# Patient Record
Sex: FEMALE | Race: WHITE
[De-identification: names, ages, dates, MRNs, and addresses within clinical notes are randomized per-mention and may not be internally consistent; named-entity substitution may affect disease eponyms.]

---

## 2017-01-11 ENCOUNTER — RECORD ABSTRACTING (OUTPATIENT)
Age: 47
End: 2017-01-11

## 2017-01-11 DIAGNOSIS — Z78.9 OTHER SPECIFIED HEALTH STATUS: ICD-10-CM

## 2017-01-11 DIAGNOSIS — D64.9 ANEMIA, UNSPECIFIED: ICD-10-CM

## 2017-01-11 DIAGNOSIS — N60.02 SOLITARY CYST OF LEFT BREAST: ICD-10-CM

## 2017-01-11 DIAGNOSIS — K51.90 ULCERATIVE COLITIS, UNSPECIFIED, W/OUT COMPLICATIONS: ICD-10-CM

## 2017-01-11 DIAGNOSIS — Z80.3 FAMILY HISTORY OF MALIGNANT NEOPLASM OF BREAST: ICD-10-CM

## 2017-03-06 ENCOUNTER — APPOINTMENT (OUTPATIENT)
Dept: BREAST CENTER | Facility: CLINIC | Age: 47
End: 2017-03-06

## 2017-05-22 ENCOUNTER — APPOINTMENT (OUTPATIENT)
Dept: BREAST CENTER | Facility: CLINIC | Age: 47
End: 2017-05-22

## 2017-05-31 ENCOUNTER — OUTPATIENT (OUTPATIENT)
Dept: OUTPATIENT SERVICES | Facility: HOSPITAL | Age: 47
LOS: 1 days | Discharge: HOME | End: 2017-05-31

## 2017-05-31 DIAGNOSIS — R07.9 CHEST PAIN, UNSPECIFIED: ICD-10-CM

## 2017-05-31 DIAGNOSIS — R07.2 PRECORDIAL PAIN: ICD-10-CM

## 2017-06-28 DIAGNOSIS — D05.12 INTRADUCTAL CARCINOMA IN SITU OF LEFT BREAST: ICD-10-CM

## 2017-09-12 DIAGNOSIS — D05.10 INTRADUCTAL CARCINOMA IN SITU OF UNSPECIFIED BREAST: ICD-10-CM

## 2017-09-18 ENCOUNTER — OUTPATIENT (OUTPATIENT)
Dept: OUTPATIENT SERVICES | Facility: HOSPITAL | Age: 47
LOS: 1 days | Discharge: HOME | End: 2017-09-18

## 2017-09-18 DIAGNOSIS — R07.2 PRECORDIAL PAIN: ICD-10-CM

## 2017-09-18 DIAGNOSIS — R92.8 OTHER ABNORMAL AND INCONCLUSIVE FINDINGS ON DIAGNOSTIC IMAGING OF BREAST: ICD-10-CM

## 2017-09-18 DIAGNOSIS — R07.9 CHEST PAIN, UNSPECIFIED: ICD-10-CM

## 2017-09-22 DIAGNOSIS — R92.8 OTHER ABNORMAL AND INCONCLUSIVE FINDINGS ON DIAGNOSTIC IMAGING OF BREAST: ICD-10-CM

## 2017-09-29 ENCOUNTER — OUTPATIENT (OUTPATIENT)
Dept: OUTPATIENT SERVICES | Facility: HOSPITAL | Age: 47
LOS: 1 days | Discharge: HOME | End: 2017-09-29

## 2017-09-29 DIAGNOSIS — R07.2 PRECORDIAL PAIN: ICD-10-CM

## 2017-09-29 DIAGNOSIS — N64.9 DISORDER OF BREAST, UNSPECIFIED: ICD-10-CM

## 2017-09-29 DIAGNOSIS — N64.2 ATROPHY OF BREAST: ICD-10-CM

## 2017-09-29 DIAGNOSIS — R07.9 CHEST PAIN, UNSPECIFIED: ICD-10-CM

## 2019-03-14 ENCOUNTER — EMERGENCY (EMERGENCY)
Facility: HOSPITAL | Age: 49
LOS: 0 days | Discharge: HOME | End: 2019-03-15
Attending: EMERGENCY MEDICINE | Admitting: PHYSICIAN ASSISTANT

## 2019-03-14 VITALS
HEART RATE: 97 BPM | OXYGEN SATURATION: 99 % | DIASTOLIC BLOOD PRESSURE: 70 MMHG | TEMPERATURE: 97 F | RESPIRATION RATE: 18 BRPM | SYSTOLIC BLOOD PRESSURE: 139 MMHG

## 2019-03-14 DIAGNOSIS — Z86.711 PERSONAL HISTORY OF PULMONARY EMBOLISM: ICD-10-CM

## 2019-03-14 DIAGNOSIS — G51.0 BELL'S PALSY: ICD-10-CM

## 2019-03-14 DIAGNOSIS — R06.02 SHORTNESS OF BREATH: ICD-10-CM

## 2019-03-14 DIAGNOSIS — R07.89 OTHER CHEST PAIN: ICD-10-CM

## 2019-03-14 DIAGNOSIS — R07.9 CHEST PAIN, UNSPECIFIED: ICD-10-CM

## 2019-03-14 LAB
ALBUMIN SERPL ELPH-MCNC: 3.6 G/DL — SIGNIFICANT CHANGE UP (ref 3.5–5.2)
ALP SERPL-CCNC: 87 U/L — SIGNIFICANT CHANGE UP (ref 30–115)
ALT FLD-CCNC: 17 U/L — SIGNIFICANT CHANGE UP (ref 0–41)
ANION GAP SERPL CALC-SCNC: 11 MMOL/L — SIGNIFICANT CHANGE UP (ref 7–14)
AST SERPL-CCNC: 29 U/L — SIGNIFICANT CHANGE UP (ref 0–41)
BASOPHILS # BLD AUTO: 0.03 K/UL — SIGNIFICANT CHANGE UP (ref 0–0.2)
BASOPHILS NFR BLD AUTO: 0.5 % — SIGNIFICANT CHANGE UP (ref 0–1)
BILIRUB SERPL-MCNC: 0.3 MG/DL — SIGNIFICANT CHANGE UP (ref 0.2–1.2)
BUN SERPL-MCNC: 13 MG/DL — SIGNIFICANT CHANGE UP (ref 10–20)
CALCIUM SERPL-MCNC: 9.2 MG/DL — SIGNIFICANT CHANGE UP (ref 8.5–10.1)
CHLORIDE SERPL-SCNC: 104 MMOL/L — SIGNIFICANT CHANGE UP (ref 98–110)
CK SERPL-CCNC: 81 U/L — SIGNIFICANT CHANGE UP (ref 0–225)
CO2 SERPL-SCNC: 24 MMOL/L — SIGNIFICANT CHANGE UP (ref 17–32)
CREAT SERPL-MCNC: 0.8 MG/DL — SIGNIFICANT CHANGE UP (ref 0.7–1.5)
EOSINOPHIL # BLD AUTO: 0.11 K/UL — SIGNIFICANT CHANGE UP (ref 0–0.7)
EOSINOPHIL NFR BLD AUTO: 2 % — SIGNIFICANT CHANGE UP (ref 0–8)
GLUCOSE SERPL-MCNC: 90 MG/DL — SIGNIFICANT CHANGE UP (ref 70–99)
HCT VFR BLD CALC: 39.4 % — SIGNIFICANT CHANGE UP (ref 37–47)
HGB BLD-MCNC: 12.2 G/DL — SIGNIFICANT CHANGE UP (ref 12–16)
IMM GRANULOCYTES NFR BLD AUTO: 0.2 % — SIGNIFICANT CHANGE UP (ref 0.1–0.3)
LIDOCAIN IGE QN: 28 U/L — SIGNIFICANT CHANGE UP (ref 7–60)
LYMPHOCYTES # BLD AUTO: 1.36 K/UL — SIGNIFICANT CHANGE UP (ref 1.2–3.4)
LYMPHOCYTES # BLD AUTO: 24.4 % — SIGNIFICANT CHANGE UP (ref 20.5–51.1)
MCHC RBC-ENTMCNC: 24.9 PG — LOW (ref 27–31)
MCHC RBC-ENTMCNC: 31 G/DL — LOW (ref 32–37)
MCV RBC AUTO: 80.6 FL — LOW (ref 81–99)
MONOCYTES # BLD AUTO: 0.69 K/UL — HIGH (ref 0.1–0.6)
MONOCYTES NFR BLD AUTO: 12.4 % — HIGH (ref 1.7–9.3)
NEUTROPHILS # BLD AUTO: 3.37 K/UL — SIGNIFICANT CHANGE UP (ref 1.4–6.5)
NEUTROPHILS NFR BLD AUTO: 60.5 % — SIGNIFICANT CHANGE UP (ref 42.2–75.2)
NRBC # BLD: 0 /100 WBCS — SIGNIFICANT CHANGE UP (ref 0–0)
PLATELET # BLD AUTO: 318 K/UL — SIGNIFICANT CHANGE UP (ref 130–400)
POTASSIUM SERPL-MCNC: 4.8 MMOL/L — SIGNIFICANT CHANGE UP (ref 3.5–5)
POTASSIUM SERPL-SCNC: 4.8 MMOL/L — SIGNIFICANT CHANGE UP (ref 3.5–5)
PROT SERPL-MCNC: 7 G/DL — SIGNIFICANT CHANGE UP (ref 6–8)
RBC # BLD: 4.89 M/UL — SIGNIFICANT CHANGE UP (ref 4.2–5.4)
RBC # FLD: 14.2 % — SIGNIFICANT CHANGE UP (ref 11.5–14.5)
SODIUM SERPL-SCNC: 139 MMOL/L — SIGNIFICANT CHANGE UP (ref 135–146)
TROPONIN T SERPL-MCNC: <0.01 NG/ML — SIGNIFICANT CHANGE UP
WBC # BLD: 5.57 K/UL — SIGNIFICANT CHANGE UP (ref 4.8–10.8)
WBC # FLD AUTO: 5.57 K/UL — SIGNIFICANT CHANGE UP (ref 4.8–10.8)

## 2019-03-14 NOTE — ED PROVIDER NOTE - PHYSICAL EXAMINATION
VITAL SIGNS: I have reviewed nursing notes and confirm.  CONSTITUTIONAL: non-toxic  SKIN: no rash, no petechiae.  EYES: PERRL, EOMI, pink conjunctiva, anicteric  ENT: tongue midline, no exudates, MMM  NECK: Supple; no meningismus, no JVD  CARD: RRR, no murmurs, equal radial pulses bilaterally 2+  RESP: CTAB, no respiratory distress  ABD: Soft, non-tender, non-distended, no peritoneal signs, no HSM, no CVA tenderness  EXT: Normal ROM x4. No edema. No calves tenderness  NEURO: Alert, oriented. CN2-12 intact, equal strength bilaterally, nl gait.  PSYCH: Cooperative, anxious

## 2019-03-14 NOTE — ED ADULT NURSE NOTE - PMH
Head Injury Chest pain, unspecified type Breast cancer  left side, lumpectomy  Chest pain, unspecified type Bell palsy    Breast cancer  left side, lumpectomy  Chest pain, unspecified type    Pulmonary embolism

## 2019-03-14 NOTE — ED PROVIDER NOTE - PROGRESS NOTE DETAILS
signed out to Dr. Mitchell, follow up ct, reassess, likely obs CTA negative for PE, will place patient in obs for further work  up of CP.

## 2019-03-14 NOTE — ED PROVIDER NOTE - OBJECTIVE STATEMENT
48 yo female h/o breast CA in remission, PE 6 years ago not currently on any meds, MVP and Bell's Palsy p/w chest pain and pressure today. BIEMS. Pt notes she had flu like symptoms last week that resolved but has not been back to her normal self this week. She has been having "agida" this week. today she c/o sscp and pain, as if elephant is sitting on her chest, associated with some htn and sob.  Got ntg and asa by ems with some relief.  Denies trauma, recent travel, leg pain or swelling. Notes this feels different from when she had PE when she had primarily sob.

## 2019-03-14 NOTE — ED PROVIDER NOTE - CLINICAL SUMMARY MEDICAL DECISION MAKING FREE TEXT BOX
48 yo female with CP, troponin, CXR and CTA chest are negative, ECG non-ischemic , patient was placed in EDOU for further work up.

## 2019-03-14 NOTE — ED PROVIDER NOTE - NS ED ROS FT
Review of Systems    Constitutional: (-) fever  Respiratory: (-) cough, (-) shortness of breath  Gastrointestinal: (-) vomiting, (-) diarrhea, (-) abdominal pain  Musculoskeletal: (-) neck pain, (-) back pain, (-) joint pain  Integumentary: (-) rash, (-) edema  Neurological: (-) headache, (-) altered mental status    Except as documented in the HPI, all other systems are negative.

## 2019-03-15 VITALS
SYSTOLIC BLOOD PRESSURE: 117 MMHG | HEART RATE: 78 BPM | DIASTOLIC BLOOD PRESSURE: 69 MMHG | TEMPERATURE: 99 F | OXYGEN SATURATION: 100 % | RESPIRATION RATE: 20 BRPM

## 2019-03-15 LAB — TROPONIN T SERPL-MCNC: <0.01 NG/ML — SIGNIFICANT CHANGE UP

## 2019-03-15 NOTE — ED CDU PROVIDER INITIAL DAY NOTE - PHYSICAL EXAMINATION
Constitutional: Well developed, well nourished. NAD  Head: Normocephalic, atraumatic.  Eyes: PERRL, EOMI.  ENT: No nasal discharge. Mucous membranes dry.  Neck: Supple. Painless ROM.  Cardiovascular:  Regular rate and rhythm.   Pulmonary: . Lungs clear to auscultation bilaterally. No wheezing, rales, or rhonchi.  Abdominal: Soft. Nondistended. No rebound, guarding, rigidity.  Extremities. Pelvis stable. No lower extremity edema, symmetric calves.  Skin: No rashes, cyanosis.  Neuro: AAOx3. No focal neurological deficits.  Psych: Normal mood. Normal affect.

## 2019-03-15 NOTE — ED CDU PROVIDER INITIAL DAY NOTE - OBJECTIVE STATEMENT
49 yold female to ED Pmhx Breast ca, Hx Pe, MVP c/o moderated sscp describe as pressure intermittent x 1 day; pt with sob; pt denies n/v/fever, chills, diaphoresis, - did not attempt otc pain meds; pain no pleuritic; pt seen in main Ed ct angio done negative for PE; pt place in obs for futher eval; pt doesn't want nuclear stress test, cant have ccta(already received IV dye today);

## 2019-03-15 NOTE — ED CDU PROVIDER DISPOSITION NOTE - NSFOLLOWUPINSTRUCTIONS_ED_ALL_ED_FT
Follow up with your PMD in 1 week  Continue all your home medications  Return to the ED if your symptoms worsen/return

## 2019-03-15 NOTE — ED CDU PROVIDER INITIAL DAY NOTE - PROGRESS NOTE DETAILS
received signout from Dr. Mitchell for eval of cp; 2nd ce sent; will plan for regular stress in am; Pt seen at bedside, NAD. Arrived overnight, received from [facility]. Pt presenting for chest pain associated with SOB. Cardiac enzymes negative x 2. CTA - negative PE. Pt scheduled for Exercise Stress test Pt s/p Exercise Stress test, negative, patient able to be discharged. Dr Gómez aware of the patient and disposition. Pt was discharged under OBS Attending Dr Gómez.

## 2019-03-15 NOTE — ED CDU PROVIDER DISPOSITION NOTE - CLINICAL COURSE
Patient was sent to EDOU for further evaaluation of atypical chest pains, has remained in no distress and with stable vitals, ekgs times two normal and without any ischemic changes, stress testing was read as normal, Copies of all blood work and other studies were given to patient and family and will fallow up with cardiology next week

## 2019-04-30 PROBLEM — C50.919 MALIGNANT NEOPLASM OF UNSPECIFIED SITE OF UNSPECIFIED FEMALE BREAST: Chronic | Status: ACTIVE | Noted: 2019-03-15

## 2019-04-30 PROBLEM — G51.0 BELL'S PALSY: Chronic | Status: ACTIVE | Noted: 2019-03-15

## 2019-05-02 ENCOUNTER — APPOINTMENT (OUTPATIENT)
Dept: OBGYN | Facility: CLINIC | Age: 49
End: 2019-05-02
Payer: COMMERCIAL

## 2019-05-02 ENCOUNTER — LABORATORY RESULT (OUTPATIENT)
Age: 49
End: 2019-05-02

## 2019-05-02 ENCOUNTER — OUTPATIENT (OUTPATIENT)
Dept: OUTPATIENT SERVICES | Facility: HOSPITAL | Age: 49
LOS: 1 days | Discharge: HOME | End: 2019-05-02

## 2019-05-02 VITALS
WEIGHT: 164 LBS | BODY MASS INDEX: 27.32 KG/M2 | HEIGHT: 65 IN | DIASTOLIC BLOOD PRESSURE: 80 MMHG | SYSTOLIC BLOOD PRESSURE: 120 MMHG

## 2019-05-02 DIAGNOSIS — N89.9 NONINFLAMMATORY DISORDER OF VAGINA, UNSPECIFIED: ICD-10-CM

## 2019-05-02 DIAGNOSIS — N83.209 UNSPECIFIED OVARIAN CYST, UNSPECIFIED SIDE: ICD-10-CM

## 2019-05-02 DIAGNOSIS — Z00.00 ENCOUNTER FOR GENERAL ADULT MEDICAL EXAMINATION W/OUT ABNORMAL FINDINGS: ICD-10-CM

## 2019-05-02 DIAGNOSIS — D25.9 LEIOMYOMA OF UTERUS, UNSPECIFIED: ICD-10-CM

## 2019-05-02 DIAGNOSIS — R10.2 PELVIC AND PERINEAL PAIN: ICD-10-CM

## 2019-05-02 DIAGNOSIS — Z01.411 ENCOUNTER FOR GYNECOLOGICAL EXAMINATION (GENERAL) (ROUTINE) WITH ABNORMAL FINDINGS: ICD-10-CM

## 2019-05-02 PROCEDURE — 99202 OFFICE O/P NEW SF 15 MIN: CPT | Mod: 25

## 2019-05-02 PROCEDURE — 57135 EXCISION VAGINAL CYST/TUMOR: CPT

## 2019-05-02 PROCEDURE — 99386 PREV VISIT NEW AGE 40-64: CPT | Mod: 25

## 2019-05-02 NOTE — COUNSELING
[Nutrition] : nutrition [Exercise] : exercise [Vitamins/Supplements] : vitamins/supplements [STD (testing, results, tx)] : STD (testing, results, tx) [Safe Sexual Practices] : safe sexual practices [Weight Management] : weight management

## 2019-05-02 NOTE — PHYSICAL EXAM
[Alert] : alert [Awake] : awake [Acute Distress] : no acute distress [Mass] : no breast mass [Nipple Discharge] : no nipple discharge [Axillary LAD] : no axillary lymphadenopathy [Tender] : non tender [Soft] : soft [Oriented x3] : oriented to person, place, and time [Normal] : vagina [No Bleeding] : there was no active vaginal bleeding [Discharge] : had no discharge [Pap Obtained] : a Pap smear was performed [de-identified] : yellow mass noted in between labia minora [FreeTextEntry5] : yellow material coating the right vagina and right cervix. pt was unable to tolerate bimanual exam at this moment; no prolapse was noted. [FreeTextEntry4] : yellow/gray, necrotic appearing mass, 3-4 cm, foul smelling, with 3mm stalk coming from vaginal wall, near the hymen at about 9 oclock. [FreeTextEntry7] : pt was unable to tolerate bimanual exam at this moment

## 2019-05-02 NOTE — HISTORY OF PRESENT ILLNESS
[Last Mammogram ___] : Last Mammogram was [unfilled] [Postmenopausal] : is postmenopausal [Pregnancy History] : pregnancy history:

## 2019-05-28 ENCOUNTER — APPOINTMENT (OUTPATIENT)
Dept: ANTEPARTUM | Facility: CLINIC | Age: 49
End: 2019-05-28
Payer: COMMERCIAL

## 2019-05-28 PROCEDURE — 76856 US EXAM PELVIC COMPLETE: CPT | Mod: 26

## 2019-05-28 PROCEDURE — 76830 TRANSVAGINAL US NON-OB: CPT | Mod: 26

## 2019-05-30 LAB — HPV HIGH+LOW RISK DNA PNL CVX: NOT DETECTED

## 2019-07-11 ENCOUNTER — OUTPATIENT (OUTPATIENT)
Dept: OUTPATIENT SERVICES | Facility: HOSPITAL | Age: 49
LOS: 1 days | Discharge: HOME | End: 2019-07-11

## 2019-07-11 ENCOUNTER — APPOINTMENT (OUTPATIENT)
Dept: OBGYN | Facility: CLINIC | Age: 49
End: 2019-07-11
Payer: MEDICARE

## 2019-07-11 VITALS
DIASTOLIC BLOOD PRESSURE: 88 MMHG | SYSTOLIC BLOOD PRESSURE: 122 MMHG | WEIGHT: 161 LBS | BODY MASS INDEX: 26.82 KG/M2 | HEIGHT: 65 IN

## 2019-07-11 DIAGNOSIS — D25.9 LEIOMYOMA OF UTERUS, UNSPECIFIED: ICD-10-CM

## 2019-07-11 DIAGNOSIS — R10.2 PELVIC AND PERINEAL PAIN: ICD-10-CM

## 2019-07-11 PROCEDURE — 99213 OFFICE O/P EST LOW 20 MIN: CPT

## 2019-07-11 NOTE — PHYSICAL EXAM
[Normal] : uterus [No Bleeding] : there was no active vaginal bleeding [Motion Tenderness] : there was no cervical motion tenderness [Uterine Adnexae] : were not tender and not enlarged [de-identified] : site from removal of lesion is well healed. [FreeTextEntry5] : appears normal  [FreeTextEntry7] : enlarged, mobile, irregular. fullness anteriorly and on the left

## 2019-07-11 NOTE — COUNSELING
[Nutrition] : nutrition [Exercise] : exercise [Vitamins/Supplements] : vitamins/supplements [Safe Sexual Practices] : safe sexual practices [STD (testing, results, tx)] : STD (testing, results, tx) [Pre/Post Op Instructions] : pre/post op instructions

## 2019-07-12 DIAGNOSIS — D25.9 LEIOMYOMA OF UTERUS, UNSPECIFIED: ICD-10-CM

## 2019-07-12 DIAGNOSIS — R10.2 PELVIC AND PERINEAL PAIN: ICD-10-CM

## 2019-08-19 ENCOUNTER — OUTPATIENT (OUTPATIENT)
Dept: OUTPATIENT SERVICES | Facility: HOSPITAL | Age: 49
LOS: 1 days | Discharge: HOME | End: 2019-08-19
Payer: MEDICARE

## 2019-08-19 VITALS
HEART RATE: 83 BPM | OXYGEN SATURATION: 98 % | HEIGHT: 65 IN | TEMPERATURE: 98 F | DIASTOLIC BLOOD PRESSURE: 85 MMHG | RESPIRATION RATE: 16 BRPM | WEIGHT: 162.04 LBS | SYSTOLIC BLOOD PRESSURE: 133 MMHG

## 2019-08-19 DIAGNOSIS — Z01.818 ENCOUNTER FOR OTHER PREPROCEDURAL EXAMINATION: ICD-10-CM

## 2019-08-19 DIAGNOSIS — D25.9 LEIOMYOMA OF UTERUS, UNSPECIFIED: ICD-10-CM

## 2019-08-19 DIAGNOSIS — Z98.890 OTHER SPECIFIED POSTPROCEDURAL STATES: Chronic | ICD-10-CM

## 2019-08-19 DIAGNOSIS — Z90.49 ACQUIRED ABSENCE OF OTHER SPECIFIED PARTS OF DIGESTIVE TRACT: Chronic | ICD-10-CM

## 2019-08-19 LAB
ALBUMIN SERPL ELPH-MCNC: 4.3 G/DL — SIGNIFICANT CHANGE UP (ref 3.5–5.2)
ALP SERPL-CCNC: 102 U/L — SIGNIFICANT CHANGE UP (ref 30–115)
ALT FLD-CCNC: 14 U/L — SIGNIFICANT CHANGE UP (ref 0–41)
ANION GAP SERPL CALC-SCNC: 13 MMOL/L — SIGNIFICANT CHANGE UP (ref 7–14)
APPEARANCE UR: CLEAR — SIGNIFICANT CHANGE UP
APTT BLD: 32.6 SEC — SIGNIFICANT CHANGE UP (ref 27–39.2)
AST SERPL-CCNC: 17 U/L — SIGNIFICANT CHANGE UP (ref 0–41)
BASOPHILS # BLD AUTO: 0.02 K/UL — SIGNIFICANT CHANGE UP (ref 0–0.2)
BASOPHILS NFR BLD AUTO: 0.3 % — SIGNIFICANT CHANGE UP (ref 0–1)
BILIRUB SERPL-MCNC: 0.3 MG/DL — SIGNIFICANT CHANGE UP (ref 0.2–1.2)
BILIRUB UR-MCNC: NEGATIVE — SIGNIFICANT CHANGE UP
BLD GP AB SCN SERPL QL: SIGNIFICANT CHANGE UP
BUN SERPL-MCNC: 16 MG/DL — SIGNIFICANT CHANGE UP (ref 10–20)
CALCIUM SERPL-MCNC: 9.6 MG/DL — SIGNIFICANT CHANGE UP (ref 8.5–10.1)
CHLORIDE SERPL-SCNC: 101 MMOL/L — SIGNIFICANT CHANGE UP (ref 98–110)
CO2 SERPL-SCNC: 28 MMOL/L — SIGNIFICANT CHANGE UP (ref 17–32)
COLOR SPEC: YELLOW — SIGNIFICANT CHANGE UP
CREAT SERPL-MCNC: 0.7 MG/DL — SIGNIFICANT CHANGE UP (ref 0.7–1.5)
DIFF PNL FLD: NEGATIVE — SIGNIFICANT CHANGE UP
EOSINOPHIL # BLD AUTO: 0.1 K/UL — SIGNIFICANT CHANGE UP (ref 0–0.7)
EOSINOPHIL NFR BLD AUTO: 1.7 % — SIGNIFICANT CHANGE UP (ref 0–8)
ESTIMATED AVERAGE GLUCOSE: 114 MG/DL — SIGNIFICANT CHANGE UP (ref 68–114)
GLUCOSE SERPL-MCNC: 74 MG/DL — SIGNIFICANT CHANGE UP (ref 70–99)
GLUCOSE UR QL: NEGATIVE — SIGNIFICANT CHANGE UP
HBA1C BLD-MCNC: 5.6 % — SIGNIFICANT CHANGE UP (ref 4–5.6)
HCT VFR BLD CALC: 42.4 % — SIGNIFICANT CHANGE UP (ref 37–47)
HGB BLD-MCNC: 12.9 G/DL — SIGNIFICANT CHANGE UP (ref 12–16)
IMM GRANULOCYTES NFR BLD AUTO: 0.2 % — SIGNIFICANT CHANGE UP (ref 0.1–0.3)
INR BLD: 1.03 RATIO — SIGNIFICANT CHANGE UP (ref 0.65–1.3)
KETONES UR-MCNC: NEGATIVE — SIGNIFICANT CHANGE UP
LEUKOCYTE ESTERASE UR-ACNC: NEGATIVE — SIGNIFICANT CHANGE UP
LYMPHOCYTES # BLD AUTO: 1.47 K/UL — SIGNIFICANT CHANGE UP (ref 1.2–3.4)
LYMPHOCYTES # BLD AUTO: 24.3 % — SIGNIFICANT CHANGE UP (ref 20.5–51.1)
MCHC RBC-ENTMCNC: 24.7 PG — LOW (ref 27–31)
MCHC RBC-ENTMCNC: 30.4 G/DL — LOW (ref 32–37)
MCV RBC AUTO: 81.2 FL — SIGNIFICANT CHANGE UP (ref 81–99)
MONOCYTES # BLD AUTO: 0.69 K/UL — HIGH (ref 0.1–0.6)
MONOCYTES NFR BLD AUTO: 11.4 % — HIGH (ref 1.7–9.3)
NEUTROPHILS # BLD AUTO: 3.76 K/UL — SIGNIFICANT CHANGE UP (ref 1.4–6.5)
NEUTROPHILS NFR BLD AUTO: 62.1 % — SIGNIFICANT CHANGE UP (ref 42.2–75.2)
NITRITE UR-MCNC: NEGATIVE — SIGNIFICANT CHANGE UP
NRBC # BLD: 0 /100 WBCS — SIGNIFICANT CHANGE UP (ref 0–0)
PH UR: 5.5 — SIGNIFICANT CHANGE UP (ref 5–8)
PLATELET # BLD AUTO: 338 K/UL — SIGNIFICANT CHANGE UP (ref 130–400)
POTASSIUM SERPL-MCNC: 4.6 MMOL/L — SIGNIFICANT CHANGE UP (ref 3.5–5)
POTASSIUM SERPL-SCNC: 4.6 MMOL/L — SIGNIFICANT CHANGE UP (ref 3.5–5)
PROT SERPL-MCNC: 7.9 G/DL — SIGNIFICANT CHANGE UP (ref 6–8)
PROT UR-MCNC: SIGNIFICANT CHANGE UP
PROTHROM AB SERPL-ACNC: 11.8 SEC — SIGNIFICANT CHANGE UP (ref 9.95–12.87)
RBC # BLD: 5.22 M/UL — SIGNIFICANT CHANGE UP (ref 4.2–5.4)
RBC # FLD: 15.2 % — HIGH (ref 11.5–14.5)
SODIUM SERPL-SCNC: 142 MMOL/L — SIGNIFICANT CHANGE UP (ref 135–146)
SP GR SPEC: 1.02 — SIGNIFICANT CHANGE UP (ref 1.01–1.02)
UROBILINOGEN FLD QL: SIGNIFICANT CHANGE UP
WBC # BLD: 6.05 K/UL — SIGNIFICANT CHANGE UP (ref 4.8–10.8)
WBC # FLD AUTO: 6.05 K/UL — SIGNIFICANT CHANGE UP (ref 4.8–10.8)

## 2019-08-19 PROCEDURE — 93010 ELECTROCARDIOGRAM REPORT: CPT

## 2019-08-19 NOTE — H&P PST ADULT - REASON FOR ADMISSION
48 yo female presents for "hysterectomy, I had breast cancer& I have a lot of abdominal pain, they decided that I should have a hysterectomy";  denies chest pain, palpitations, shortness of breath, dyspnea, or dysuria. exercise tolerance: 2 blocks/ flights of stairs w/o sob

## 2019-08-19 NOTE — H&P PST ADULT - NSICDXPASTMEDICALHX_GEN_ALL_CORE_FT
PAST MEDICAL HISTORY:  Anxiety     Bell palsy     Breast cancer left side, lumpectomy    Breast cancer left breast cancer,, lumpectomy&  radiation (~2017)    Liver disease "twisted liver", hemangioma    MVP (mitral valve prolapse)     OA (osteoarthritis)     Pulmonary embolism > 5 yrs ago, no current meds, pt is unsure of cause    S/P colectomy hx ulcerative colitis

## 2019-08-20 PROBLEM — F41.9 ANXIETY DISORDER, UNSPECIFIED: Chronic | Status: ACTIVE | Noted: 2019-08-19

## 2019-08-20 PROBLEM — M19.90 UNSPECIFIED OSTEOARTHRITIS, UNSPECIFIED SITE: Chronic | Status: ACTIVE | Noted: 2019-08-19

## 2019-08-20 PROBLEM — I34.1 NONRHEUMATIC MITRAL (VALVE) PROLAPSE: Chronic | Status: ACTIVE | Noted: 2019-08-19

## 2019-08-20 LAB
CULTURE RESULTS: NO GROWTH — SIGNIFICANT CHANGE UP
SPECIMEN SOURCE: SIGNIFICANT CHANGE UP

## 2019-09-10 PROBLEM — I26.99 OTHER PULMONARY EMBOLISM WITHOUT ACUTE COR PULMONALE: Chronic | Status: ACTIVE | Noted: 2019-03-15

## 2019-09-10 PROBLEM — C50.919 MALIGNANT NEOPLASM OF UNSPECIFIED SITE OF UNSPECIFIED FEMALE BREAST: Chronic | Status: ACTIVE | Noted: 2019-08-19

## 2019-09-10 PROBLEM — Z90.49 ACQUIRED ABSENCE OF OTHER SPECIFIED PARTS OF DIGESTIVE TRACT: Chronic | Status: ACTIVE | Noted: 2019-08-19

## 2019-09-10 PROBLEM — K76.9 LIVER DISEASE, UNSPECIFIED: Chronic | Status: ACTIVE | Noted: 2019-08-19

## 2019-09-17 ENCOUNTER — INPATIENT (INPATIENT)
Facility: HOSPITAL | Age: 49
LOS: 18 days | Discharge: HOME | End: 2019-10-06
Attending: OBSTETRICS & GYNECOLOGY | Admitting: OBSTETRICS & GYNECOLOGY
Payer: MEDICARE

## 2019-09-17 ENCOUNTER — RESULT REVIEW (OUTPATIENT)
Age: 49
End: 2019-09-17

## 2019-09-17 VITALS
HEIGHT: 65 IN | DIASTOLIC BLOOD PRESSURE: 88 MMHG | RESPIRATION RATE: 18 BRPM | HEART RATE: 86 BPM | SYSTOLIC BLOOD PRESSURE: 131 MMHG | TEMPERATURE: 99 F | WEIGHT: 149.91 LBS

## 2019-09-17 DIAGNOSIS — Z90.49 ACQUIRED ABSENCE OF OTHER SPECIFIED PARTS OF DIGESTIVE TRACT: Chronic | ICD-10-CM

## 2019-09-17 DIAGNOSIS — Z98.890 OTHER SPECIFIED POSTPROCEDURAL STATES: Chronic | ICD-10-CM

## 2019-09-17 DIAGNOSIS — E86.0 DEHYDRATION: ICD-10-CM

## 2019-09-17 DIAGNOSIS — K65.1 PERITONEAL ABSCESS: ICD-10-CM

## 2019-09-17 LAB
BASOPHILS # BLD AUTO: 0.01 K/UL — SIGNIFICANT CHANGE UP (ref 0–0.2)
BASOPHILS NFR BLD AUTO: 0.1 % — SIGNIFICANT CHANGE UP (ref 0–1)
BLD GP AB SCN SERPL QL: SIGNIFICANT CHANGE UP
EOSINOPHIL # BLD AUTO: 0 K/UL — SIGNIFICANT CHANGE UP (ref 0–0.7)
EOSINOPHIL NFR BLD AUTO: 0 % — SIGNIFICANT CHANGE UP (ref 0–8)
GLUCOSE BLDC GLUCOMTR-MCNC: 102 MG/DL — HIGH (ref 70–99)
HCT VFR BLD CALC: 33 % — LOW (ref 37–47)
HGB BLD-MCNC: 10 G/DL — LOW (ref 12–16)
IMM GRANULOCYTES NFR BLD AUTO: 0.3 % — SIGNIFICANT CHANGE UP (ref 0.1–0.3)
LYMPHOCYTES # BLD AUTO: 0.71 K/UL — LOW (ref 1.2–3.4)
LYMPHOCYTES # BLD AUTO: 9.6 % — LOW (ref 20.5–51.1)
MCHC RBC-ENTMCNC: 24.9 PG — LOW (ref 27–31)
MCHC RBC-ENTMCNC: 30.3 G/DL — LOW (ref 32–37)
MCV RBC AUTO: 82.3 FL — SIGNIFICANT CHANGE UP (ref 81–99)
MONOCYTES # BLD AUTO: 0.88 K/UL — HIGH (ref 0.1–0.6)
MONOCYTES NFR BLD AUTO: 11.9 % — HIGH (ref 1.7–9.3)
NEUTROPHILS # BLD AUTO: 5.79 K/UL — SIGNIFICANT CHANGE UP (ref 1.4–6.5)
NEUTROPHILS NFR BLD AUTO: 78.1 % — HIGH (ref 42.2–75.2)
NRBC # BLD: 0 /100 WBCS — SIGNIFICANT CHANGE UP (ref 0–0)
PLATELET # BLD AUTO: 246 K/UL — SIGNIFICANT CHANGE UP (ref 130–400)
RBC # BLD: 4.01 M/UL — LOW (ref 4.2–5.4)
RBC # FLD: 14.9 % — HIGH (ref 11.5–14.5)
WBC # BLD: 7.41 K/UL — SIGNIFICANT CHANGE UP (ref 4.8–10.8)
WBC # FLD AUTO: 7.41 K/UL — SIGNIFICANT CHANGE UP (ref 4.8–10.8)

## 2019-09-17 PROCEDURE — 58573 TLH W/T/O UTERUS OVER 250 G: CPT

## 2019-09-17 PROCEDURE — 88307 TISSUE EXAM BY PATHOLOGIST: CPT | Mod: 26

## 2019-09-17 RX ORDER — HEPARIN SODIUM 5000 [USP'U]/ML
5000 INJECTION INTRAVENOUS; SUBCUTANEOUS
Refills: 0 | Status: DISCONTINUED | OUTPATIENT
Start: 2019-09-17 | End: 2019-09-17

## 2019-09-17 RX ORDER — ACETAMINOPHEN 500 MG
650 TABLET ORAL EVERY 6 HOURS
Refills: 0 | Status: DISCONTINUED | OUTPATIENT
Start: 2019-09-17 | End: 2019-09-19

## 2019-09-17 RX ORDER — METOCLOPRAMIDE HCL 10 MG
10 TABLET ORAL ONCE
Refills: 0 | Status: DISCONTINUED | OUTPATIENT
Start: 2019-09-17 | End: 2019-09-19

## 2019-09-17 RX ORDER — SIMETHICONE 80 MG/1
80 TABLET, CHEWABLE ORAL EVERY 4 HOURS
Refills: 0 | Status: DISCONTINUED | OUTPATIENT
Start: 2019-09-17 | End: 2019-09-20

## 2019-09-17 RX ORDER — ONDANSETRON 8 MG/1
4 TABLET, FILM COATED ORAL DAILY
Refills: 0 | Status: DISCONTINUED | OUTPATIENT
Start: 2019-09-17 | End: 2019-09-19

## 2019-09-17 RX ORDER — HEPARIN SODIUM 5000 [USP'U]/ML
5000 INJECTION INTRAVENOUS; SUBCUTANEOUS EVERY 12 HOURS
Refills: 0 | Status: DISCONTINUED | OUTPATIENT
Start: 2019-09-17 | End: 2019-09-19

## 2019-09-17 RX ORDER — DEXAMETHASONE 0.5 MG/5ML
4 ELIXIR ORAL ONCE
Refills: 0 | Status: DISCONTINUED | OUTPATIENT
Start: 2019-09-17 | End: 2019-09-17

## 2019-09-17 RX ORDER — FAMOTIDINE 10 MG/ML
20 INJECTION INTRAVENOUS
Refills: 0 | Status: DISCONTINUED | OUTPATIENT
Start: 2019-09-17 | End: 2019-09-19

## 2019-09-17 RX ORDER — DOCUSATE SODIUM 100 MG
100 CAPSULE ORAL
Refills: 0 | Status: DISCONTINUED | OUTPATIENT
Start: 2019-09-17 | End: 2019-09-19

## 2019-09-17 RX ORDER — MAGNESIUM HYDROXIDE 400 MG/1
30 TABLET, CHEWABLE ORAL DAILY
Refills: 0 | Status: DISCONTINUED | OUTPATIENT
Start: 2019-09-17 | End: 2019-09-19

## 2019-09-17 RX ORDER — KETOROLAC TROMETHAMINE 30 MG/ML
15 SYRINGE (ML) INJECTION EVERY 6 HOURS
Refills: 0 | Status: DISCONTINUED | OUTPATIENT
Start: 2019-09-17 | End: 2019-09-19

## 2019-09-17 RX ORDER — SODIUM CHLORIDE 9 MG/ML
1000 INJECTION, SOLUTION INTRAVENOUS
Refills: 0 | Status: DISCONTINUED | OUTPATIENT
Start: 2019-09-17 | End: 2019-09-17

## 2019-09-17 RX ORDER — HYDROMORPHONE HYDROCHLORIDE 2 MG/ML
0.5 INJECTION INTRAMUSCULAR; INTRAVENOUS; SUBCUTANEOUS
Refills: 0 | Status: DISCONTINUED | OUTPATIENT
Start: 2019-09-17 | End: 2019-09-17

## 2019-09-17 RX ORDER — IBUPROFEN 200 MG
600 TABLET ORAL EVERY 6 HOURS
Refills: 0 | Status: DISCONTINUED | OUTPATIENT
Start: 2019-09-17 | End: 2019-09-19

## 2019-09-17 RX ORDER — ONDANSETRON 8 MG/1
4 TABLET, FILM COATED ORAL ONCE
Refills: 0 | Status: DISCONTINUED | OUTPATIENT
Start: 2019-09-17 | End: 2019-09-17

## 2019-09-17 RX ORDER — HYDROMORPHONE HYDROCHLORIDE 2 MG/ML
1 INJECTION INTRAMUSCULAR; INTRAVENOUS; SUBCUTANEOUS
Refills: 0 | Status: DISCONTINUED | OUTPATIENT
Start: 2019-09-17 | End: 2019-09-17

## 2019-09-17 RX ORDER — ZOLPIDEM TARTRATE 10 MG/1
5 TABLET ORAL AT BEDTIME
Refills: 0 | Status: DISCONTINUED | OUTPATIENT
Start: 2019-09-17 | End: 2019-09-20

## 2019-09-17 RX ORDER — SODIUM CHLORIDE 9 MG/ML
1000 INJECTION, SOLUTION INTRAVENOUS
Refills: 0 | Status: DISCONTINUED | OUTPATIENT
Start: 2019-09-17 | End: 2019-09-18

## 2019-09-17 RX ORDER — GENTAMICIN SULFATE 40 MG/ML
80 VIAL (ML) INJECTION EVERY 8 HOURS
Refills: 0 | Status: DISCONTINUED | OUTPATIENT
Start: 2019-09-17 | End: 2019-09-18

## 2019-09-17 RX ORDER — MEPERIDINE HYDROCHLORIDE 50 MG/ML
12.5 INJECTION INTRAMUSCULAR; INTRAVENOUS; SUBCUTANEOUS
Refills: 0 | Status: DISCONTINUED | OUTPATIENT
Start: 2019-09-17 | End: 2019-09-17

## 2019-09-17 RX ORDER — KETOROLAC TROMETHAMINE 30 MG/ML
30 SYRINGE (ML) INJECTION EVERY 6 HOURS
Refills: 0 | Status: DISCONTINUED | OUTPATIENT
Start: 2019-09-17 | End: 2019-09-19

## 2019-09-17 RX ORDER — GABAPENTIN 400 MG/1
300 CAPSULE ORAL EVERY 6 HOURS
Refills: 0 | Status: DISCONTINUED | OUTPATIENT
Start: 2019-09-17 | End: 2019-09-19

## 2019-09-17 RX ORDER — ONDANSETRON 8 MG/1
4 TABLET, FILM COATED ORAL EVERY 6 HOURS
Refills: 0 | Status: DISCONTINUED | OUTPATIENT
Start: 2019-09-17 | End: 2019-09-19

## 2019-09-17 RX ORDER — HEPARIN SODIUM 5000 [USP'U]/ML
5000 INJECTION INTRAVENOUS; SUBCUTANEOUS ONCE
Refills: 0 | Status: COMPLETED | OUTPATIENT
Start: 2019-09-17 | End: 2019-09-17

## 2019-09-17 RX ORDER — OXYCODONE HYDROCHLORIDE 5 MG/1
5 TABLET ORAL EVERY 6 HOURS
Refills: 0 | Status: DISCONTINUED | OUTPATIENT
Start: 2019-09-17 | End: 2019-09-19

## 2019-09-17 RX ADMIN — ONDANSETRON 4 MILLIGRAM(S): 8 TABLET, FILM COATED ORAL at 22:01

## 2019-09-17 RX ADMIN — SIMETHICONE 80 MILLIGRAM(S): 80 TABLET, CHEWABLE ORAL at 18:43

## 2019-09-17 RX ADMIN — HYDROMORPHONE HYDROCHLORIDE 1 MILLIGRAM(S): 2 INJECTION INTRAMUSCULAR; INTRAVENOUS; SUBCUTANEOUS at 17:25

## 2019-09-17 RX ADMIN — HYDROMORPHONE HYDROCHLORIDE 1 MILLIGRAM(S): 2 INJECTION INTRAMUSCULAR; INTRAVENOUS; SUBCUTANEOUS at 17:50

## 2019-09-17 RX ADMIN — Medication 600 MILLIGRAM(S): at 18:42

## 2019-09-17 RX ADMIN — Medication 100 MILLIGRAM(S): at 22:02

## 2019-09-17 RX ADMIN — Medication 104 MILLIGRAM(S): at 22:00

## 2019-09-17 RX ADMIN — SODIUM CHLORIDE 150 MILLILITER(S): 9 INJECTION, SOLUTION INTRAVENOUS at 17:16

## 2019-09-17 RX ADMIN — Medication 600 MILLIGRAM(S): at 23:55

## 2019-09-17 RX ADMIN — Medication 650 MILLIGRAM(S): at 18:42

## 2019-09-17 RX ADMIN — HEPARIN SODIUM 5000 UNIT(S): 5000 INJECTION INTRAVENOUS; SUBCUTANEOUS at 07:04

## 2019-09-17 RX ADMIN — HYDROMORPHONE HYDROCHLORIDE 1 MILLIGRAM(S): 2 INJECTION INTRAMUSCULAR; INTRAVENOUS; SUBCUTANEOUS at 16:55

## 2019-09-17 RX ADMIN — Medication 100 MILLIGRAM(S): at 18:42

## 2019-09-17 RX ADMIN — GABAPENTIN 300 MILLIGRAM(S): 400 CAPSULE ORAL at 18:42

## 2019-09-17 RX ADMIN — GABAPENTIN 300 MILLIGRAM(S): 400 CAPSULE ORAL at 23:55

## 2019-09-17 RX ADMIN — Medication 650 MILLIGRAM(S): at 23:56

## 2019-09-17 RX ADMIN — HYDROMORPHONE HYDROCHLORIDE 1 MILLIGRAM(S): 2 INJECTION INTRAMUSCULAR; INTRAVENOUS; SUBCUTANEOUS at 17:15

## 2019-09-17 RX ADMIN — SIMETHICONE 80 MILLIGRAM(S): 80 TABLET, CHEWABLE ORAL at 22:17

## 2019-09-17 RX ADMIN — HYDROMORPHONE HYDROCHLORIDE 1 MILLIGRAM(S): 2 INJECTION INTRAMUSCULAR; INTRAVENOUS; SUBCUTANEOUS at 16:45

## 2019-09-17 RX ADMIN — HEPARIN SODIUM 5000 UNIT(S): 5000 INJECTION INTRAVENOUS; SUBCUTANEOUS at 22:01

## 2019-09-17 RX ADMIN — HYDROMORPHONE HYDROCHLORIDE 1 MILLIGRAM(S): 2 INJECTION INTRAMUSCULAR; INTRAVENOUS; SUBCUTANEOUS at 17:20

## 2019-09-17 NOTE — BRIEF OPERATIVE NOTE - OPERATION/FINDINGS
pelvic anatomy obscured by enlarged fibroid uterus, larger anterior left broad ligament fibroid approximately 5cm, posterior fibroid approximately 4cm, multiple cervical fibroids, bilateral fallopian tubes stretched over uterine fibroids, ovaries unable to be visualized likely removed with pelvic specimen of uterus and fibroids, cystoscopy revealed ureteral jets bilaterally and no injury or stiches within the bladder wall pelvic anatomy distorted by enlarged multiple uterine fibroids: larger anterior left broad ligament fibroid approximately 7cm, posterior to the right fibroid approximately 5cm, multiple cervical fibroids, bilateral fallopian tubes stretched over uterine fibroids, ovaries unable to be visualized likely removed with pelvic specimen of uterus and fibroids (no uterus, cervix, tubes or ovaries visualized in the pelvis at the end of the procedure). Surgically absent colon. Diagnostic cystoscopy revealed ureteral jets bilaterally and no injury or stitches within the bladder wall. Bubble test done through rectum, no gas noted to be leaking, no rectal injury revealed

## 2019-09-17 NOTE — BRIEF OPERATIVE NOTE - NSICDXBRIEFPOSTOP_GEN_ALL_CORE_FT
POST-OP DIAGNOSIS:  Uterine fibroid 17-Sep-2019 16:18:41  Phyllis Lee POST-OP DIAGNOSIS:  Pain pelvic 17-Sep-2019 18:29:14  Carola Houston  Uterine fibroid 17-Sep-2019 16:18:41  Phyllis Lee

## 2019-09-17 NOTE — BRIEF OPERATIVE NOTE - NSICDXBRIEFPREOP_GEN_ALL_CORE_FT
PRE-OP DIAGNOSIS:  Uterine fibroid 17-Sep-2019 16:18:35  Phyllis Lee PRE-OP DIAGNOSIS:  Pain pelvic 17-Sep-2019 18:29:05  Carola Houston  Uterine fibroid 17-Sep-2019 16:18:35  Phyllis Lee

## 2019-09-17 NOTE — ASU PATIENT PROFILE, ADULT - PMH
Anxiety    Bell palsy    Breast cancer  left breast cancer,, lumpectomy&  radiation (~2017)  Breast cancer  left side, lumpectomy  Liver disease  "twisted liver", hemangioma  MVP (mitral valve prolapse)    OA (osteoarthritis)    Pulmonary embolism  > 5 yrs ago, no current meds, pt is unsure of cause  S/P colectomy  hx ulcerative colitis

## 2019-09-17 NOTE — CHART NOTE - NSCHARTNOTEFT_GEN_A_CORE
PACU ANESTHESIA ADMISSION NOTE      Procedure: robotic hysterectomy, bilateral salpingoopherectomy and diagnostic cystoscopy     Post op diagnosis:  Uterine fibroid      ____  Intubated  TV:______       Rate: ______      FiO2: ______    _x___  Patent Airway    _x___  Full return of protective reflexes    _x___  Full recovery from anesthesia / back to baseline status    Vitals:  T(F): 98.8   HR: 86  BP: 138/92  RR: 14  SpO2: 100%    Mental Status:  _x___ Awake   __x___ Alert   _____ Drowsy   _____ Sedated    Nausea/Vomiting:  _x___  NO       ______Yes,   See Post - Op Orders         Pain Scale (0-10):  __0___    Treatment: _x___ None    ____ See Post - Op/PCA Orders    Post - Operative Fluids:   ___ Oral   ___x_ See Post - Op Orders    Plan: Discharge:   ____Home       __x___Floor     _____Critical Care    _____  Other:_________________    Comments:  No anesthesia issues or complications noted.  Discharge when criteria met.

## 2019-09-17 NOTE — PROGRESS NOTE ADULT - ASSESSMENT
48yo P1 h/o UC s/p total colectomy, h/o DCIS s/p lumpectomy and radiation, h/o unprovoked PE and prothombin gene mutation, hx of fibroids RATL BSO cystoscopy, vaginal laceration repair, EBL 250cc, POD 0, doing well    - F/u 2000 labs  - F/u 0430 labs  - IV fluids  - Pain mgt prn  - Incentive spirometer use encouraged  - SCDs and Heparin ordered for DVT ppx  - Clinda and Gent to be given x 2 doses  - Regular diet      Dr. Wayne to be made aware

## 2019-09-17 NOTE — BRIEF OPERATIVE NOTE - NSICDXBRIEFPROCEDURE_GEN_ALL_CORE_FT
PROCEDURES:  Salpingoophorectomy 17-Sep-2019 16:18:21  Phyllis Lee  Robot-assisted laparoscopic hysterectomy with cystoscopy 17-Sep-2019 16:18:06  Phyllis Lee PROCEDURES:  Cystoscopy, diagnostic 17-Sep-2019 18:28:48  Carola Houston  Laparoscopic total hysterectomy with salpingo-oophorectomy for uterus greater than 250 grams 17-Sep-2019 18:28:26 Robot-assisted Carola Houston PROCEDURES:  Lysis, adhesions, pelvis, laparoscopic 17-Sep-2019 18:39:22  Carola Houston  Cystoscopy, diagnostic 17-Sep-2019 18:28:48  Carola Houston  Laparoscopic total hysterectomy with salpingo-oophorectomy for uterus greater than 250 grams 17-Sep-2019 18:28:26 Robot-assisted Carola Houston

## 2019-09-17 NOTE — PROGRESS NOTE ADULT - SUBJECTIVE AND OBJECTIVE BOX
PGY 4 Note:    Dx: Fibroid uterus  Procedure: RATLH BSO cystoscopy, vaginal laceration repair  EBL: 250cc  POD: 0    Patient seen and examined. C/o abdominal soreness and nausea. No other complaints at this time. Denies fever, chills, nausea, vomiting, chest pain, shortness of breath, severe abdominal pain, heavy vaginal bleeding. Is not yet ambulating, tolerating PO, or passing flatus.     Physical Exam:  Vital Signs:  T(C): 37 (09-17-19 @ 20:40), Max: 37.2 (09-17-19 @ 06:36)  HR: 75 (09-17-19 @ 20:40) (68 - 92)  BP: 138/72 (09-17-19 @ 20:40) (106/68 - 145/90)  RR: 18 (09-17-19 @ 20:40) (11 - 18)  SpO2: 100% (09-17-19 @ 19:19) (96% - 100%)    UO 3269-5504: 135cc    Gen: NAD, A&Ox3  Heart: S1S2,RRR  Lungs: CTABL  Abd: ND, soft, NT, BS+, incision with dressing c/d/i  VE: Deferred, no active bleeding  Ext: SCDs, no edema or calf tenderness bilaterally    Labs:    2000 labs pending      Medications:  acetaminophen   Tablet .. 650 milliGRAM(s) Oral every 6 hours  clindamycin IVPB 900 milliGRAM(s) IV Intermittent every 8 hours  docusate sodium 100 milliGRAM(s) Oral two times a day  famotidine Injectable 20 milliGRAM(s) IV Push two times a day  gabapentin 300 milliGRAM(s) Oral every 6 hours  gentamicin   IVPB 80 milliGRAM(s) IV Intermittent every 8 hours  heparin  Injectable 5000 Unit(s) SubCutaneous every 12 hours  ibuprofen  Tablet. 600 milliGRAM(s) Oral every 6 hours  ketorolac   Injectable 15 milliGRAM(s) IV Push every 6 hours PRN  ketorolac   Injectable 30 milliGRAM(s) IV Push every 6 hours PRN  lactated ringers. 1000 milliLiter(s) IV Continuous <Continuous>  magnesium hydroxide Suspension 30 milliLiter(s) Oral daily PRN  metoclopramide Injectable 10 milliGRAM(s) IV Push once PRN  ondansetron Injectable 4 milliGRAM(s) IV Push every 6 hours PRN  ondansetron Injectable 4 milliGRAM(s) IV Push daily PRN  oxyCODONE    IR 5 milliGRAM(s) Oral every 6 hours PRN  simethicone 80 milliGRAM(s) Chew every 4 hours  zolpidem 5 milliGRAM(s) Oral at bedtime PRN

## 2019-09-18 LAB
ANION GAP SERPL CALC-SCNC: 11 MMOL/L — SIGNIFICANT CHANGE UP (ref 7–14)
BUN SERPL-MCNC: 17 MG/DL — SIGNIFICANT CHANGE UP (ref 10–20)
CALCIUM SERPL-MCNC: 8.5 MG/DL — SIGNIFICANT CHANGE UP (ref 8.5–10.1)
CHLORIDE SERPL-SCNC: 104 MMOL/L — SIGNIFICANT CHANGE UP (ref 98–110)
CO2 SERPL-SCNC: 22 MMOL/L — SIGNIFICANT CHANGE UP (ref 17–32)
CREAT SERPL-MCNC: 0.9 MG/DL — SIGNIFICANT CHANGE UP (ref 0.7–1.5)
GLUCOSE SERPL-MCNC: 153 MG/DL — HIGH (ref 70–99)
MAGNESIUM SERPL-MCNC: 1.6 MG/DL — LOW (ref 1.8–2.4)
PHOSPHATE SERPL-MCNC: 4.6 MG/DL — SIGNIFICANT CHANGE UP (ref 2.1–4.9)
POTASSIUM SERPL-MCNC: 4.7 MMOL/L — SIGNIFICANT CHANGE UP (ref 3.5–5)
POTASSIUM SERPL-SCNC: 4.7 MMOL/L — SIGNIFICANT CHANGE UP (ref 3.5–5)
SODIUM SERPL-SCNC: 137 MMOL/L — SIGNIFICANT CHANGE UP (ref 135–146)

## 2019-09-18 PROCEDURE — 99024 POSTOP FOLLOW-UP VISIT: CPT

## 2019-09-18 RX ORDER — MAGNESIUM SULFATE 500 MG/ML
2 VIAL (ML) INJECTION ONCE
Refills: 0 | Status: COMPLETED | OUTPATIENT
Start: 2019-09-18 | End: 2019-09-18

## 2019-09-18 RX ADMIN — Medication 100 MILLIGRAM(S): at 03:52

## 2019-09-18 RX ADMIN — Medication 600 MILLIGRAM(S): at 16:20

## 2019-09-18 RX ADMIN — Medication 650 MILLIGRAM(S): at 16:20

## 2019-09-18 RX ADMIN — Medication 650 MILLIGRAM(S): at 23:11

## 2019-09-18 RX ADMIN — SIMETHICONE 80 MILLIGRAM(S): 80 TABLET, CHEWABLE ORAL at 02:01

## 2019-09-18 RX ADMIN — GABAPENTIN 300 MILLIGRAM(S): 400 CAPSULE ORAL at 05:22

## 2019-09-18 RX ADMIN — Medication 600 MILLIGRAM(S): at 11:12

## 2019-09-18 RX ADMIN — SIMETHICONE 80 MILLIGRAM(S): 80 TABLET, CHEWABLE ORAL at 05:22

## 2019-09-18 RX ADMIN — GABAPENTIN 300 MILLIGRAM(S): 400 CAPSULE ORAL at 17:23

## 2019-09-18 RX ADMIN — Medication 650 MILLIGRAM(S): at 17:22

## 2019-09-18 RX ADMIN — Medication 650 MILLIGRAM(S): at 05:21

## 2019-09-18 RX ADMIN — HEPARIN SODIUM 5000 UNIT(S): 5000 INJECTION INTRAVENOUS; SUBCUTANEOUS at 10:43

## 2019-09-18 RX ADMIN — SIMETHICONE 80 MILLIGRAM(S): 80 TABLET, CHEWABLE ORAL at 17:23

## 2019-09-18 RX ADMIN — HEPARIN SODIUM 5000 UNIT(S): 5000 INJECTION INTRAVENOUS; SUBCUTANEOUS at 23:11

## 2019-09-18 RX ADMIN — SIMETHICONE 80 MILLIGRAM(S): 80 TABLET, CHEWABLE ORAL at 14:29

## 2019-09-18 RX ADMIN — Medication 600 MILLIGRAM(S): at 23:11

## 2019-09-18 RX ADMIN — Medication 50 GRAM(S): at 02:06

## 2019-09-18 RX ADMIN — FAMOTIDINE 20 MILLIGRAM(S): 10 INJECTION INTRAVENOUS at 17:23

## 2019-09-18 RX ADMIN — OXYCODONE HYDROCHLORIDE 5 MILLIGRAM(S): 5 TABLET ORAL at 11:40

## 2019-09-18 RX ADMIN — Medication 100 MILLIGRAM(S): at 05:22

## 2019-09-18 RX ADMIN — SIMETHICONE 80 MILLIGRAM(S): 80 TABLET, CHEWABLE ORAL at 10:45

## 2019-09-18 RX ADMIN — Medication 104 MILLIGRAM(S): at 04:54

## 2019-09-18 RX ADMIN — OXYCODONE HYDROCHLORIDE 5 MILLIGRAM(S): 5 TABLET ORAL at 11:09

## 2019-09-18 RX ADMIN — Medication 30 MILLIGRAM(S): at 10:45

## 2019-09-18 RX ADMIN — FAMOTIDINE 20 MILLIGRAM(S): 10 INJECTION INTRAVENOUS at 05:23

## 2019-09-18 RX ADMIN — Medication 600 MILLIGRAM(S): at 05:22

## 2019-09-18 RX ADMIN — Medication 650 MILLIGRAM(S): at 11:12

## 2019-09-18 RX ADMIN — Medication 100 MILLIGRAM(S): at 17:22

## 2019-09-18 RX ADMIN — SODIUM CHLORIDE 150 MILLILITER(S): 9 INJECTION, SOLUTION INTRAVENOUS at 03:52

## 2019-09-18 RX ADMIN — GABAPENTIN 300 MILLIGRAM(S): 400 CAPSULE ORAL at 11:12

## 2019-09-18 RX ADMIN — OXYCODONE HYDROCHLORIDE 5 MILLIGRAM(S): 5 TABLET ORAL at 23:11

## 2019-09-18 RX ADMIN — Medication 600 MILLIGRAM(S): at 17:22

## 2019-09-18 RX ADMIN — SIMETHICONE 80 MILLIGRAM(S): 80 TABLET, CHEWABLE ORAL at 23:11

## 2019-09-18 RX ADMIN — Medication 30 MILLIGRAM(S): at 16:20

## 2019-09-18 RX ADMIN — GABAPENTIN 300 MILLIGRAM(S): 400 CAPSULE ORAL at 23:11

## 2019-09-18 NOTE — PROGRESS NOTE ADULT - SUBJECTIVE AND OBJECTIVE BOX
PGY 4 Note    Patient examined at bedside, no overnight events, pain well controlled.  Denies fevers/chills, HA/N/V, CP/SOB/palpitations, abdominal pain. Tolerating regular diet, no flatus. Not Ambulating.    T(F): 96 (09-18-19 @ 03:00), Max: 98.8 (09-17-19 @ 16:19)  HR: 57 (09-18-19 @ 03:00) (57 - 92)  BP: 120/61 (09-18-19 @ 03:00) (106/68 - 145/90)  RR: 18 (09-18-19 @ 03:00) (11 - 18)  SpO2: 100% (09-17-19 @ 19:19) (96% - 100%)    I&O's Summary    17 Sep 2019 07:01  -  18 Sep 2019 06:46  --------------------------------------------------------  IN: 450 mL / OUT: 510 mL / NET: -60 mL      Urine:   09-17-19 @ 07:01  -  09-18-19 @ 06:46  --------------------------------------------------------  IN: 0 mL / OUT: 510 mL / NET: -510 mL      Physical Exam:  General: NAD  CVS: RRR. Nl S1S2  Lungs: CTAB  Abdomen: soft, mildly tender, mildly distended, +BSx4  Incision: dressings in place, C/D, no erythema, no draining  VE: deferred, minimal serosanguinous blood on pad  Ext: No edema. No calf tenderness.  SCDs in place    Labs:             10.0<L>  7.41  )-----------( 246      ( 09-17 @ 23:35 )             33.0<L>     09-17    137  |  104  |  17  ----------------------------<  153<H>  4.7   |  22  |  0.9    Ca    8.5      17 Sep 2019 23:35  Phos  4.6     09-17  Mg     1.6     09-17        Trend:             10.0<L>  7.41  )-----------( 246      ( 09-17 @ 23:35 )             33.0<L>        Creatinine, Serum: 0.9 (09-17)      Medications:  MEDICATIONS  (STANDING):  acetaminophen   Tablet .. 650 milliGRAM(s) Oral every 6 hours  clindamycin IVPB 900 milliGRAM(s) IV Intermittent every 8 hours  docusate sodium 100 milliGRAM(s) Oral two times a day  famotidine Injectable 20 milliGRAM(s) IV Push two times a day  gabapentin 300 milliGRAM(s) Oral every 6 hours  gentamicin   IVPB 80 milliGRAM(s) IV Intermittent every 8 hours  heparin  Injectable 5000 Unit(s) SubCutaneous every 12 hours  ibuprofen  Tablet. 600 milliGRAM(s) Oral every 6 hours  lactated ringers. 1000 milliLiter(s) (150 mL/Hr) IV Continuous <Continuous>  simethicone 80 milliGRAM(s) Chew every 4 hours    MEDICATIONS  (PRN):  ketorolac   Injectable 15 milliGRAM(s) IV Push every 6 hours PRN Severe Pain (7 - 10)  ketorolac   Injectable 30 milliGRAM(s) IV Push every 6 hours PRN Severe Pain (7 - 10)  magnesium hydroxide Suspension 30 milliLiter(s) Oral daily PRN Constipation  metoclopramide Injectable 10 milliGRAM(s) IV Push once PRN nausea/ vomiting  ondansetron Injectable 4 milliGRAM(s) IV Push every 6 hours PRN Nausea and/or Vomiting  ondansetron Injectable 4 milliGRAM(s) IV Push daily PRN Nausea and/or Vomiting  oxyCODONE    IR 5 milliGRAM(s) Oral every 6 hours PRN Severe Pain (7 - 10)  zolpidem 5 milliGRAM(s) Oral at bedtime PRN Insomnia      A/P:  50yo P1 h/o UC s/p total colectomy, h/o DCIS s/p lumpectomy with radiation, h/o unproved PE found to have prothrombin mutation, with fibroid uterus and menorrhagia s/p RATLH, BSO, cystoscopy, EBL 250cc  POD #1  GI: regular diet, +BS, ambulate today  DVT prophylaxis: heparin 5000, SCDs, early ambulation  Neuro: pain control  Cardio: no dx  Pulm: incentive spirometer (informed staff to provide to pt)  ID: gent/clinda x2 doses postop for prophylaxis, afebrile  Endo: no dx  Heme: H/H post op 10/33, refusing more labs  : adequate UO graves d/c TOV 1300, minimal vaginal bleeding    Dispo: possible d/c today pending ambulation and pain control    Will inform Dr Wayne PGY 4 Note    Patient examined at bedside, no overnight events, pain well controlled.  Denies fevers/chills, HA/N/V, CP/SOB/palpitations, abdominal pain. Tolerating regular diet, no flatus. Not Ambulating.    T(F): 96 (09-18-19 @ 03:00), Max: 98.8 (09-17-19 @ 16:19)  HR: 57 (09-18-19 @ 03:00) (57 - 92)  BP: 120/61 (09-18-19 @ 03:00) (106/68 - 145/90)  RR: 18 (09-18-19 @ 03:00) (11 - 18)  SpO2: 100% (09-17-19 @ 19:19) (96% - 100%)    I&O's Summary    17 Sep 2019 07:01  -  18 Sep 2019 06:46  --------------------------------------------------------  IN: 450 mL / OUT: 510 mL / NET: -60 mL      Urine:   09-17-19 @ 07:01  -  09-18-19 @ 06:46  --------------------------------------------------------  IN: 0 mL / OUT: 510 mL / NET: -510 mL      Physical Exam:  General: NAD  CVS: RRR. Nl S1S2  Lungs: CTAB  Abdomen: soft, mildly tender, mildly distended, +BSx4. No peritoneal signs  Incision: dressings in place, C/D, no erythema, no draining  VE: deferred, minimal serosanguinous blood on pad  Ext: No edema. No calf tenderness.  SCDs in place    Labs:             10.0<L>  7.41  )-----------( 246      ( 09-17 @ 23:35 )             33.0<L>     09-17    137  |  104  |  17  ----------------------------<  153<H>  4.7   |  22  |  0.9    Ca    8.5      17 Sep 2019 23:35  Phos  4.6     09-17  Mg     1.6     09-17        Trend:             10.0<L>  7.41  )-----------( 246      ( 09-17 @ 23:35 )             33.0<L>        Creatinine, Serum: 0.9 (09-17)      Medications:  MEDICATIONS  (STANDING):  acetaminophen   Tablet .. 650 milliGRAM(s) Oral every 6 hours  clindamycin IVPB 900 milliGRAM(s) IV Intermittent every 8 hours  docusate sodium 100 milliGRAM(s) Oral two times a day  famotidine Injectable 20 milliGRAM(s) IV Push two times a day  gabapentin 300 milliGRAM(s) Oral every 6 hours  gentamicin   IVPB 80 milliGRAM(s) IV Intermittent every 8 hours  heparin  Injectable 5000 Unit(s) SubCutaneous every 12 hours  ibuprofen  Tablet. 600 milliGRAM(s) Oral every 6 hours  lactated ringers. 1000 milliLiter(s) (150 mL/Hr) IV Continuous <Continuous>  simethicone 80 milliGRAM(s) Chew every 4 hours    MEDICATIONS  (PRN):  ketorolac   Injectable 15 milliGRAM(s) IV Push every 6 hours PRN Severe Pain (7 - 10)  ketorolac   Injectable 30 milliGRAM(s) IV Push every 6 hours PRN Severe Pain (7 - 10)  magnesium hydroxide Suspension 30 milliLiter(s) Oral daily PRN Constipation  metoclopramide Injectable 10 milliGRAM(s) IV Push once PRN nausea/ vomiting  ondansetron Injectable 4 milliGRAM(s) IV Push every 6 hours PRN Nausea and/or Vomiting  ondansetron Injectable 4 milliGRAM(s) IV Push daily PRN Nausea and/or Vomiting  oxyCODONE    IR 5 milliGRAM(s) Oral every 6 hours PRN Severe Pain (7 - 10)  zolpidem 5 milliGRAM(s) Oral at bedtime PRN Insomnia      A/P:  50yo P1 h/o UC with total colectomy, h/o DCIS s/p lumpectomy with radiation, h/o unprovocked PE found to have prothrombin mutation (heterozygous), with fibroid uterus and pelvic pain s/p RATLH, BSO, cystoscopy, EBL 250cc  POD #1  GI: regular diet, +BS, ambulate today  DVT prophylaxis: heparin 5000, SCDs, early ambulation  Neuro: pain control  Cardio: no dx  Pulm: incentive spirometer (informed staff to provide to pt)  ID: gent/clinda x2 doses postop for prophylaxis, afebrile  Endo: no dx  Heme: H/H post op 10/33, refusing more labs  : adequate UO graves d/c TOV 1300, minimal vaginal bleeding    Dispo: possible d/c today pending ambulation and pain control    Will inform Dr Wayne

## 2019-09-18 NOTE — PROGRESS NOTE ADULT - SUBJECTIVE AND OBJECTIVE BOX
PGY 4 note    Pt seen at bedside for evaluation for possible discharge.  Pt reports pain not well controlled, taking tylenol and oxycodone, able to ambulate.  Abdomen soft on physical exam, mildly distended and tender with good BS.  Passing flatus.  Desires to stay overnight for pain control.  Pt also reports feelings of sadness and several episodes of crying throughout the day, thinks its related to removing uterus, denies SI/HI, reports similar episodes of sadness in the past.  Offered SW which she accepted.  Will continue to observe overnight and reassess in AM.    Dr Wagner Wayne aware

## 2019-09-19 LAB
ALBUMIN SERPL ELPH-MCNC: 3.3 G/DL — LOW (ref 3.5–5.2)
ALP SERPL-CCNC: 79 U/L — SIGNIFICANT CHANGE UP (ref 30–115)
ALT FLD-CCNC: 15 U/L — SIGNIFICANT CHANGE UP (ref 0–41)
AMYLASE P1 CFR SERPL: 50 U/L — SIGNIFICANT CHANGE UP (ref 25–115)
ANION GAP SERPL CALC-SCNC: 8 MMOL/L — SIGNIFICANT CHANGE UP (ref 7–14)
AST SERPL-CCNC: 20 U/L — SIGNIFICANT CHANGE UP (ref 0–41)
BASOPHILS # BLD AUTO: 0.01 K/UL — SIGNIFICANT CHANGE UP (ref 0–0.2)
BASOPHILS NFR BLD AUTO: 0.1 % — SIGNIFICANT CHANGE UP (ref 0–1)
BILIRUB SERPL-MCNC: 0.5 MG/DL — SIGNIFICANT CHANGE UP (ref 0.2–1.2)
BUN SERPL-MCNC: 12 MG/DL — SIGNIFICANT CHANGE UP (ref 10–20)
CALCIUM SERPL-MCNC: 9.4 MG/DL — SIGNIFICANT CHANGE UP (ref 8.5–10.1)
CHLORIDE SERPL-SCNC: 104 MMOL/L — SIGNIFICANT CHANGE UP (ref 98–110)
CO2 SERPL-SCNC: 29 MMOL/L — SIGNIFICANT CHANGE UP (ref 17–32)
CREAT SERPL-MCNC: 0.7 MG/DL — SIGNIFICANT CHANGE UP (ref 0.7–1.5)
EOSINOPHIL # BLD AUTO: 0.02 K/UL — SIGNIFICANT CHANGE UP (ref 0–0.7)
EOSINOPHIL NFR BLD AUTO: 0.2 % — SIGNIFICANT CHANGE UP (ref 0–8)
GLUCOSE SERPL-MCNC: 135 MG/DL — HIGH (ref 70–99)
HCT VFR BLD CALC: 34.5 % — LOW (ref 37–47)
HGB BLD-MCNC: 10.6 G/DL — LOW (ref 12–16)
IMM GRANULOCYTES NFR BLD AUTO: 0.6 % — HIGH (ref 0.1–0.3)
LACTATE SERPL-SCNC: 1.1 MMOL/L — SIGNIFICANT CHANGE UP (ref 0.5–2.2)
LIDOCAIN IGE QN: 12 U/L — SIGNIFICANT CHANGE UP (ref 7–60)
LYMPHOCYTES # BLD AUTO: 0.56 K/UL — LOW (ref 1.2–3.4)
LYMPHOCYTES # BLD AUTO: 5.8 % — LOW (ref 20.5–51.1)
MAGNESIUM SERPL-MCNC: 1.8 MG/DL — SIGNIFICANT CHANGE UP (ref 1.8–2.4)
MCHC RBC-ENTMCNC: 24.9 PG — LOW (ref 27–31)
MCHC RBC-ENTMCNC: 30.7 G/DL — LOW (ref 32–37)
MCV RBC AUTO: 81.2 FL — SIGNIFICANT CHANGE UP (ref 81–99)
MONOCYTES # BLD AUTO: 0.85 K/UL — HIGH (ref 0.1–0.6)
MONOCYTES NFR BLD AUTO: 8.8 % — SIGNIFICANT CHANGE UP (ref 1.7–9.3)
NEUTROPHILS # BLD AUTO: 8.14 K/UL — HIGH (ref 1.4–6.5)
NEUTROPHILS NFR BLD AUTO: 84.5 % — HIGH (ref 42.2–75.2)
NRBC # BLD: 0 /100 WBCS — SIGNIFICANT CHANGE UP (ref 0–0)
PHOSPHATE SERPL-MCNC: 2.2 MG/DL — SIGNIFICANT CHANGE UP (ref 2.1–4.9)
PLATELET # BLD AUTO: 332 K/UL — SIGNIFICANT CHANGE UP (ref 130–400)
POTASSIUM SERPL-MCNC: 4.8 MMOL/L — SIGNIFICANT CHANGE UP (ref 3.5–5)
POTASSIUM SERPL-SCNC: 4.8 MMOL/L — SIGNIFICANT CHANGE UP (ref 3.5–5)
PROT SERPL-MCNC: 6.2 G/DL — SIGNIFICANT CHANGE UP (ref 6–8)
RBC # BLD: 4.25 M/UL — SIGNIFICANT CHANGE UP (ref 4.2–5.4)
RBC # FLD: 15.4 % — HIGH (ref 11.5–14.5)
SODIUM SERPL-SCNC: 141 MMOL/L — SIGNIFICANT CHANGE UP (ref 135–146)
WBC # BLD: 9.64 K/UL — SIGNIFICANT CHANGE UP (ref 4.8–10.8)
WBC # FLD AUTO: 9.64 K/UL — SIGNIFICANT CHANGE UP (ref 4.8–10.8)

## 2019-09-19 PROCEDURE — 99024 POSTOP FOLLOW-UP VISIT: CPT

## 2019-09-19 RX ORDER — ONDANSETRON 8 MG/1
4 TABLET, FILM COATED ORAL EVERY 6 HOURS
Refills: 0 | Status: DISCONTINUED | OUTPATIENT
Start: 2019-09-19 | End: 2019-09-19

## 2019-09-19 RX ORDER — ONDANSETRON 8 MG/1
8 TABLET, FILM COATED ORAL EVERY 8 HOURS
Refills: 0 | Status: DISCONTINUED | OUTPATIENT
Start: 2019-09-19 | End: 2019-10-01

## 2019-09-19 RX ORDER — SODIUM CHLORIDE 9 MG/ML
1000 INJECTION, SOLUTION INTRAVENOUS
Refills: 0 | Status: DISCONTINUED | OUTPATIENT
Start: 2019-09-19 | End: 2019-09-24

## 2019-09-19 RX ORDER — ACETAMINOPHEN 500 MG
1000 TABLET ORAL ONCE
Refills: 0 | Status: COMPLETED | OUTPATIENT
Start: 2019-09-19 | End: 2019-09-23

## 2019-09-19 RX ORDER — KETOROLAC TROMETHAMINE 30 MG/ML
15 SYRINGE (ML) INJECTION EVERY 6 HOURS
Refills: 0 | Status: DISCONTINUED | OUTPATIENT
Start: 2019-09-19 | End: 2019-09-24

## 2019-09-19 RX ORDER — SODIUM CHLORIDE 9 MG/ML
1000 INJECTION, SOLUTION INTRAVENOUS
Refills: 0 | Status: DISCONTINUED | OUTPATIENT
Start: 2019-09-19 | End: 2019-09-22

## 2019-09-19 RX ORDER — FAMOTIDINE 10 MG/ML
20 INJECTION INTRAVENOUS EVERY 12 HOURS
Refills: 0 | Status: DISCONTINUED | OUTPATIENT
Start: 2019-09-19 | End: 2019-09-20

## 2019-09-19 RX ORDER — ENOXAPARIN SODIUM 100 MG/ML
40 INJECTION SUBCUTANEOUS AT BEDTIME
Refills: 0 | Status: DISCONTINUED | OUTPATIENT
Start: 2019-09-19 | End: 2019-09-23

## 2019-09-19 RX ORDER — MAGNESIUM HYDROXIDE 400 MG/1
30 TABLET, CHEWABLE ORAL
Refills: 0 | Status: DISCONTINUED | OUTPATIENT
Start: 2019-09-19 | End: 2019-09-19

## 2019-09-19 RX ADMIN — SIMETHICONE 80 MILLIGRAM(S): 80 TABLET, CHEWABLE ORAL at 02:07

## 2019-09-19 RX ADMIN — SIMETHICONE 80 MILLIGRAM(S): 80 TABLET, CHEWABLE ORAL at 05:22

## 2019-09-19 RX ADMIN — Medication 100 MILLIGRAM(S): at 05:22

## 2019-09-19 RX ADMIN — ENOXAPARIN SODIUM 40 MILLIGRAM(S): 100 INJECTION SUBCUTANEOUS at 23:00

## 2019-09-19 RX ADMIN — Medication 600 MILLIGRAM(S): at 02:49

## 2019-09-19 RX ADMIN — ONDANSETRON 8 MILLIGRAM(S): 8 TABLET, FILM COATED ORAL at 17:53

## 2019-09-19 RX ADMIN — SIMETHICONE 80 MILLIGRAM(S): 80 TABLET, CHEWABLE ORAL at 14:41

## 2019-09-19 RX ADMIN — GABAPENTIN 300 MILLIGRAM(S): 400 CAPSULE ORAL at 05:22

## 2019-09-19 RX ADMIN — Medication 650 MILLIGRAM(S): at 02:49

## 2019-09-19 RX ADMIN — OXYCODONE HYDROCHLORIDE 5 MILLIGRAM(S): 5 TABLET ORAL at 05:22

## 2019-09-19 RX ADMIN — Medication 650 MILLIGRAM(S): at 14:40

## 2019-09-19 NOTE — PROGRESS NOTE ADULT - ATTENDING COMMENTS
saw pt in the am, mother at bedside. pt stated to me she took oxycodone overnight and slept. ambulated only to the bathroom and back. voiding without difficulty, passing flatus. tolerated regular diet but ate little. spoke with patient and mother the need for her to ambulate regularly and to keep scds on while in bed to avoid dvt. helped pt up and sat her on chair. advised patient the need to monitor blood work while she is in the hospital. will d/c home. precautions given.

## 2019-09-19 NOTE — PROGRESS NOTE ADULT - SUBJECTIVE AND OBJECTIVE BOX
PGY 4 note    Patient seen at bedside and evaluated for nausea/vomiting x 3 episodes(nonbloody, non bilious)  pt also reports 3 bowel movements  Has not been eating regular diet.  Denies fever/chills, diarrhea, dysuria, chest pain, SOB, palpitations.  Minimal vaginal bleeding.  ambulating  Patient also refusing heparin.    VS  ICU Vital Signs Last 24 Hrs  T(C): 36.8 (19 Sep 2019 15:00), Max: 37.4 (19 Sep 2019 07:37)  T(F): 98.2 (19 Sep 2019 15:00), Max: 99.4 (19 Sep 2019 07:37)  HR: 101 (19 Sep 2019 15:00) (92 - 102)  BP: 126/78 (19 Sep 2019 15:00) (117/58 - 146/73)  BP(mean): --  ABP: --  ABP(mean): --  RR: 20 (19 Sep 2019 15:00) (18 - 20)  SpO2: --    GEN: NAD, AAO x 3  Heart: RRR  Lungs: CTAB  Abd: soft, non tender, non distended, no BS heard, no r/g/r, incision healing well  SVE: deferred  Ext: no calf tenderness or edema b/l, not wearing SCDs    labs  none new as patient refused initially    medications  acetaminophen   Tablet ..   650 milliGRAM(s) Oral (09-18-19 @ 23:11)   650 milliGRAM(s) Oral (09-19-19 @ 02:49)   650 milliGRAM(s) Oral (09-19-19 @ 14:40)    docusate sodium   100 milliGRAM(s) Oral (09-19-19 @ 05:22)    gabapentin   300 milliGRAM(s) Oral (09-18-19 @ 23:11)   300 milliGRAM(s) Oral (09-19-19 @ 05:22)    heparin  Injectable   5000 Unit(s) SubCutaneous (09-18-19 @ 23:11)    ibuprofen  Tablet.   600 milliGRAM(s) Oral (09-18-19 @ 23:11)   600 milliGRAM(s) Oral (09-19-19 @ 02:49)    oxyCODONE    IR   5 milliGRAM(s) Oral (09-18-19 @ 23:11)   5 milliGRAM(s) Oral (09-19-19 @ 05:22)    simethicone   80 milliGRAM(s) Chew (09-18-19 @ 23:11)   80 milliGRAM(s) Chew (09-19-19 @ 02:07)   80 milliGRAM(s) Chew (09-19-19 @ 05:22)   80 milliGRAM(s) Chew (09-19-19 @ 14:41)

## 2019-09-19 NOTE — PROGRESS NOTE ADULT - SUBJECTIVE AND OBJECTIVE BOX
PGY 4 Note    Patient examined at bedside, reports not sleeping well overnight secondary to poor pain control.  Recently received oxycodone which relieved her pain somewhat.  Denies fevers/chills, HA/N/V, CP/SOB/palpitations.  Reports minimal pink spotting in the toilet and on toilet paper when using the restroom, unsure if its urinary or vaginal.  Denies heavy vaginal bleeding, dysuria/urgency/frequency. Tolerating regular diet but not eating much, passing flatus. Ambulating. Using incentive spirometry.     T(F): 96.9 (09-19-19 @ 03:00), Max: 98.9 (09-18-19 @ 19:56)  HR: 95 (09-19-19 @ 03:00) (68 - 102)  BP: 130/58 (09-19-19 @ 03:00) (103/59 - 146/73)  RR: 18 (09-19-19 @ 03:00) (18 - 20)  SpO2: --    Physical Exam:  General: NAD  CVS: RRR. Nl S1S2  Lungs: CTAB  Abdomen: soft, mildly tender, mildly distended, +BSx4  Incision: dressing to be removed in afternoon today, C/D, no erythema, no draining  VE: deferred, no bleeding on pad/chux this AM  Ext: No edema. No calf tenderness.  SCDs in place    Labs:             10.0<L>  7.41  )-----------( 246      ( 09-17 @ 23:35 )             33.0<L>     09-17    137  |  104  |  17  ----------------------------<  153<H>  4.7   |  22  |  0.9    Ca    8.5      17 Sep 2019 23:35  Phos  4.6     09-17  Mg     1.6     09-17              Trend:             10.0<L>  7.41  )-----------( 246      ( 09-17 @ 23:35 )             33.0<L>        Creatinine, Serum: 0.9 (09-17)      Medications:  MEDICATIONS  (STANDING):  acetaminophen   Tablet .. 650 milliGRAM(s) Oral every 6 hours  docusate sodium 100 milliGRAM(s) Oral two times a day  famotidine Injectable 20 milliGRAM(s) IV Push two times a day  gabapentin 300 milliGRAM(s) Oral every 6 hours  heparin  Injectable 5000 Unit(s) SubCutaneous every 12 hours  ibuprofen  Tablet. 600 milliGRAM(s) Oral every 6 hours  simethicone 80 milliGRAM(s) Chew every 4 hours    MEDICATIONS  (PRN):  ketorolac   Injectable 15 milliGRAM(s) IV Push every 6 hours PRN Severe Pain (7 - 10)  ketorolac   Injectable 30 milliGRAM(s) IV Push every 6 hours PRN Severe Pain (7 - 10)  magnesium hydroxide Suspension 30 milliLiter(s) Oral daily PRN Constipation  metoclopramide Injectable 10 milliGRAM(s) IV Push once PRN nausea/ vomiting  ondansetron Injectable 4 milliGRAM(s) IV Push every 6 hours PRN Nausea and/or Vomiting  ondansetron Injectable 4 milliGRAM(s) IV Push daily PRN Nausea and/or Vomiting  oxyCODONE    IR 5 milliGRAM(s) Oral every 6 hours PRN Severe Pain (7 - 10)  zolpidem 5 milliGRAM(s) Oral at bedtime PRN Insomnia PGY 4 Note    Patient examined at bedside, reports not sleeping well overnight secondary to poor pain control.  Recently received oxycodone which relieved her pain somewhat.  Denies fevers/chills, HA/N/V, CP/SOB/palpitations.  Reports minimal pink spotting in the toilet and on toilet paper when using the restroom, unsure if its urinary or vaginal.  Denies heavy vaginal bleeding, dysuria/urgency/frequency. Tolerating regular diet but not eating much, passing flatus. Ambulating per her    T(F): 96.9 (09-19-19 @ 03:00), Max: 98.9 (09-18-19 @ 19:56)  HR: 95 (09-19-19 @ 03:00) (68 - 102)  BP: 130/58 (09-19-19 @ 03:00) (103/59 - 146/73)  RR: 18 (09-19-19 @ 03:00) (18 - 20)  SpO2: --    Physical Exam:  General: NAD  CVS: RRR. Nl S1S2  Lungs: CTAB  Abdomen: soft, mildly tender, mildly distended, +BSx4  Incision: dressing to be removed in afternoon today, C/D, no erythema, no draining  VE: deferred, no bleeding on pad/chux this AM  Ext: No edema. No calf tenderness.  SCDs in place    Labs:             10.0<L>  7.41  )-----------( 246      ( 09-17 @ 23:35 )             33.0<L>     09-17    137  |  104  |  17  ----------------------------<  153<H>  4.7   |  22  |  0.9    Ca    8.5      17 Sep 2019 23:35  Phos  4.6     09-17  Mg     1.6     09-17              Trend:             10.0<L>  7.41  )-----------( 246      ( 09-17 @ 23:35 )             33.0<L>        Creatinine, Serum: 0.9 (09-17)      Medications:  MEDICATIONS  (STANDING):  acetaminophen   Tablet .. 650 milliGRAM(s) Oral every 6 hours  docusate sodium 100 milliGRAM(s) Oral two times a day  famotidine Injectable 20 milliGRAM(s) IV Push two times a day  gabapentin 300 milliGRAM(s) Oral every 6 hours  heparin  Injectable 5000 Unit(s) SubCutaneous every 12 hours  ibuprofen  Tablet. 600 milliGRAM(s) Oral every 6 hours  simethicone 80 milliGRAM(s) Chew every 4 hours    MEDICATIONS  (PRN):  ketorolac   Injectable 15 milliGRAM(s) IV Push every 6 hours PRN Severe Pain (7 - 10)  ketorolac   Injectable 30 milliGRAM(s) IV Push every 6 hours PRN Severe Pain (7 - 10)  magnesium hydroxide Suspension 30 milliLiter(s) Oral daily PRN Constipation  metoclopramide Injectable 10 milliGRAM(s) IV Push once PRN nausea/ vomiting  ondansetron Injectable 4 milliGRAM(s) IV Push every 6 hours PRN Nausea and/or Vomiting  ondansetron Injectable 4 milliGRAM(s) IV Push daily PRN Nausea and/or Vomiting  oxyCODONE    IR 5 milliGRAM(s) Oral every 6 hours PRN Severe Pain (7 - 10)  zolpidem 5 milliGRAM(s) Oral at bedtime PRN Insomnia

## 2019-09-19 NOTE — PROGRESS NOTE ADULT - ASSESSMENT
48yo P1 h/o UC with total colectomy, h/o DCIS s/p lumpectomy with radiation, h/o unprovocked PE found to have prothrombin mutation (heterozygous), with fibroid uterus and pelvic pain s/p RATLH, BSO, cystoscopy, EBL 250cc  POD #2  GI: regular diet, +BS, c/o bloating, continue with simethicone/colace/MOM  DVT prophylaxis: heparin 5000, ambulation  Neuro: pain control, continue with tylenol/ibuprofen/oxycodone  Cardio: no dx  Pulm: incentive spirometer (informed staff again to provide to pt), ambulation  ID: s/p gent/clinda x2 doses postop for prophylaxis, afebrile  Endo: no dx  Heme: H/H post op 10/33, refusing more labs  : possible vaginal spotting/hematuria, will monitor    Dispo: possible d/c today pending ambulation and pain control    Will inform Dr Wayne 50yo P1 h/o UC with total colectomy, h/o DCIS s/p lumpectomy with radiation, h/o unprovocked PE found to have prothrombin mutation (heterozygous), with fibroid uterus and pelvic pain s/p RATLH, BSO, cystoscopy, EBL 250cc  POD #2  GI: regular diet, +BS, c/o bloating, continue with simethicone/colace/MOM  DVT prophylaxis: heparin 5000, ambulation  Neuro: pain control, continue with tylenol/ibuprofen/oxycodone  Cardio: no dx  Pulm: incentive spirometer (informed staff again to provide to pt), ambulation  ID: s/p gent/clinda x2 doses postop for prophylaxis, afebrile  Endo: no dx  Heme: H/H post op 10/33, refusing more labs  : possible vaginal spotting, will monitor    Dispo: possible d/c today pending ambulation and pain control    Will inform Dr Wayne

## 2019-09-19 NOTE — PROGRESS NOTE ADULT - ASSESSMENT
50yo P1 h/o UC with total colectomy, h/o DCIS s/p lumpectomy with radiation, h/o unprovocked PE found to have prothrombin mutation (heterozygous), with fibroid uterus and pelvic pain s/p RATLH, BSO, cystoscopy, EBL 250cc  POD #2, with new onset vomiting, r/o SBO vs ileus, no sign of acute abdomen  Patient initially refusing labs and IV medications, explained the risks of SBO and ileus and the need for a work up for her new onset vomiting.  Patient now verbalizes that she understands that her symptoms need to be worked up but is only agreeing to labs at this time.   Patient also refusing heparin as per nurse, explained the risks of DVT/PE, patient verablized understanding of this and is agreeable to taking lovenox.     Plan:  stat labs to be drawn now  NPO  IVF  Pepcid, zofran  Will discuss imaging such as KUB or CT abd/pelvis  reassess    Dr. Edward aware

## 2019-09-20 LAB
ALBUMIN SERPL ELPH-MCNC: 3 G/DL — LOW (ref 3.5–5.2)
ALP SERPL-CCNC: 70 U/L — SIGNIFICANT CHANGE UP (ref 30–115)
ALT FLD-CCNC: 13 U/L — SIGNIFICANT CHANGE UP (ref 0–41)
AMYLASE P1 CFR SERPL: 44 U/L — SIGNIFICANT CHANGE UP (ref 25–115)
ANION GAP SERPL CALC-SCNC: 12 MMOL/L — SIGNIFICANT CHANGE UP (ref 7–14)
AST SERPL-CCNC: 14 U/L — SIGNIFICANT CHANGE UP (ref 0–41)
BASOPHILS # BLD AUTO: 0.01 K/UL — SIGNIFICANT CHANGE UP (ref 0–0.2)
BASOPHILS NFR BLD AUTO: 0.2 % — SIGNIFICANT CHANGE UP (ref 0–1)
BILIRUB SERPL-MCNC: 0.4 MG/DL — SIGNIFICANT CHANGE UP (ref 0.2–1.2)
BUN SERPL-MCNC: 13 MG/DL — SIGNIFICANT CHANGE UP (ref 10–20)
CALCIUM SERPL-MCNC: 8.9 MG/DL — SIGNIFICANT CHANGE UP (ref 8.5–10.1)
CHLORIDE SERPL-SCNC: 103 MMOL/L — SIGNIFICANT CHANGE UP (ref 98–110)
CO2 SERPL-SCNC: 27 MMOL/L — SIGNIFICANT CHANGE UP (ref 17–32)
CREAT SERPL-MCNC: 0.7 MG/DL — SIGNIFICANT CHANGE UP (ref 0.7–1.5)
EOSINOPHIL # BLD AUTO: 0.01 K/UL — SIGNIFICANT CHANGE UP (ref 0–0.7)
EOSINOPHIL NFR BLD AUTO: 0.2 % — SIGNIFICANT CHANGE UP (ref 0–8)
GLUCOSE SERPL-MCNC: 144 MG/DL — HIGH (ref 70–99)
HCT VFR BLD CALC: 31.6 % — LOW (ref 37–47)
HGB BLD-MCNC: 9.8 G/DL — LOW (ref 12–16)
IMM GRANULOCYTES NFR BLD AUTO: 0.2 % — SIGNIFICANT CHANGE UP (ref 0.1–0.3)
LIDOCAIN IGE QN: 9 U/L — SIGNIFICANT CHANGE UP (ref 7–60)
LYMPHOCYTES # BLD AUTO: 0.4 K/UL — LOW (ref 1.2–3.4)
LYMPHOCYTES # BLD AUTO: 6.9 % — LOW (ref 20.5–51.1)
MAGNESIUM SERPL-MCNC: 1.6 MG/DL — LOW (ref 1.8–2.4)
MCHC RBC-ENTMCNC: 25.4 PG — LOW (ref 27–31)
MCHC RBC-ENTMCNC: 31 G/DL — LOW (ref 32–37)
MCV RBC AUTO: 81.9 FL — SIGNIFICANT CHANGE UP (ref 81–99)
MONOCYTES # BLD AUTO: 0.72 K/UL — HIGH (ref 0.1–0.6)
MONOCYTES NFR BLD AUTO: 12.5 % — HIGH (ref 1.7–9.3)
NEUTROPHILS # BLD AUTO: 4.61 K/UL — SIGNIFICANT CHANGE UP (ref 1.4–6.5)
NEUTROPHILS NFR BLD AUTO: 80 % — HIGH (ref 42.2–75.2)
NRBC # BLD: 0 /100 WBCS — SIGNIFICANT CHANGE UP (ref 0–0)
PHOSPHATE SERPL-MCNC: 2.9 MG/DL — SIGNIFICANT CHANGE UP (ref 2.1–4.9)
PLATELET # BLD AUTO: 295 K/UL — SIGNIFICANT CHANGE UP (ref 130–400)
POTASSIUM SERPL-MCNC: 4.3 MMOL/L — SIGNIFICANT CHANGE UP (ref 3.5–5)
POTASSIUM SERPL-SCNC: 4.3 MMOL/L — SIGNIFICANT CHANGE UP (ref 3.5–5)
PROT SERPL-MCNC: 5.7 G/DL — LOW (ref 6–8)
RBC # BLD: 3.86 M/UL — LOW (ref 4.2–5.4)
RBC # FLD: 15.7 % — HIGH (ref 11.5–14.5)
SODIUM SERPL-SCNC: 142 MMOL/L — SIGNIFICANT CHANGE UP (ref 135–146)
WBC # BLD: 5.76 K/UL — SIGNIFICANT CHANGE UP (ref 4.8–10.8)
WBC # FLD AUTO: 5.76 K/UL — SIGNIFICANT CHANGE UP (ref 4.8–10.8)

## 2019-09-20 PROCEDURE — 99024 POSTOP FOLLOW-UP VISIT: CPT

## 2019-09-20 PROCEDURE — 74177 CT ABD & PELVIS W/CONTRAST: CPT | Mod: 26

## 2019-09-20 RX ORDER — PANTOPRAZOLE SODIUM 20 MG/1
40 TABLET, DELAYED RELEASE ORAL DAILY
Refills: 0 | Status: DISCONTINUED | OUTPATIENT
Start: 2019-09-20 | End: 2019-10-01

## 2019-09-20 RX ORDER — IOHEXOL 300 MG/ML
30 INJECTION, SOLUTION INTRAVENOUS ONCE
Refills: 0 | Status: COMPLETED | OUTPATIENT
Start: 2019-09-20 | End: 2019-09-20

## 2019-09-20 RX ORDER — METOCLOPRAMIDE HCL 10 MG
10 TABLET ORAL ONCE
Refills: 0 | Status: COMPLETED | OUTPATIENT
Start: 2019-09-20 | End: 2019-09-20

## 2019-09-20 RX ADMIN — ONDANSETRON 8 MILLIGRAM(S): 8 TABLET, FILM COATED ORAL at 11:10

## 2019-09-20 RX ADMIN — Medication 15 MILLIGRAM(S): at 11:12

## 2019-09-20 RX ADMIN — ONDANSETRON 8 MILLIGRAM(S): 8 TABLET, FILM COATED ORAL at 18:58

## 2019-09-20 RX ADMIN — SODIUM CHLORIDE 125 MILLILITER(S): 9 INJECTION, SOLUTION INTRAVENOUS at 19:30

## 2019-09-20 RX ADMIN — SIMETHICONE 80 MILLIGRAM(S): 80 TABLET, CHEWABLE ORAL at 06:40

## 2019-09-20 RX ADMIN — IOHEXOL 30 MILLILITER(S): 300 INJECTION, SOLUTION INTRAVENOUS at 11:09

## 2019-09-20 RX ADMIN — ENOXAPARIN SODIUM 40 MILLIGRAM(S): 100 INJECTION SUBCUTANEOUS at 22:21

## 2019-09-20 RX ADMIN — Medication 15 MILLIGRAM(S): at 17:43

## 2019-09-20 RX ADMIN — Medication 10 MILLIGRAM(S): at 02:38

## 2019-09-20 RX ADMIN — FAMOTIDINE 20 MILLIGRAM(S): 10 INJECTION INTRAVENOUS at 17:48

## 2019-09-20 RX ADMIN — ONDANSETRON 8 MILLIGRAM(S): 8 TABLET, FILM COATED ORAL at 00:12

## 2019-09-20 RX ADMIN — SIMETHICONE 80 MILLIGRAM(S): 80 TABLET, CHEWABLE ORAL at 11:09

## 2019-09-20 RX ADMIN — FAMOTIDINE 20 MILLIGRAM(S): 10 INJECTION INTRAVENOUS at 06:40

## 2019-09-20 NOTE — PROGRESS NOTE ADULT - ASSESSMENT
A/P:  48yo P1 h/o UC with total colectomy, h/o DCIS s/p lumpectomy with radiation, h/o unprovoked PE found to have prothrombin mutation (heterozygous), with fibroid uterus and pelvic pain s/p RATLH, BSO, cystoscopy, EBL 250cc  POD #3  GI: several episodes of vomiting with +BM and +flatus, possible obstruction, nl lactate, NPO overnight, tolerating ice chips this AM, refusing blood work and imaging, stressed importance of r/o bowel obstruction pt still declining at this time, will discuss refusal of treatment and sign  DVT prophylaxis: lovenox daily, ambulation  Neuro: pain control, continue with tylenol/ibuprofen/oxycodone  Cardio: no dx  Pulm: incentive spirometer at bedside in plastic, encouraged use, ambulation  ID: s/p gent/clinda x2 doses postop for prophylaxis, afebrile  Endo: no dx  Heme: H/H post op 10/33, refusing more labs  : voiding, minimal vaginal spotting pink    Dispo: must r/o obstruction and tolerate regular diet prior to discharge    Will inform Dr Wayne

## 2019-09-20 NOTE — PROGRESS NOTE ADULT - SUBJECTIVE AND OBJECTIVE BOX
PGY3 progress note    Patient seen at bedside for vomiting. Patient reports she vomited small amount now and approximately an hour ago. Passing gas and had bowel movements yesterday.    PE:  Vital Signs Last 24 Hrs  T(C): 37.7 (19 Sep 2019 23:00), Max: 37.7 (19 Sep 2019 23:00)  T(F): 99.8 (19 Sep 2019 23:00), Max: 99.8 (19 Sep 2019 23:00)  HR: 124 (19 Sep 2019 23:00) (95 - 124)  BP: 139/69 (19 Sep 2019 23:00) (121/75 - 139/69)  BP(mean): --  RR: 18 (19 Sep 2019 23:00) (18 - 20)  SpO2: --    abd: soft, nontender, no r/g/r, laparoscopic dressings c/d/i    labs:                        10.6   9.64  )-----------( 332      ( 19 Sep 2019 17:30 )             34.5     09-19    141  |  104  |  12  ----------------------------<  135<H>  4.8   |  29  |  0.7    Ca    9.4      19 Sep 2019 17:30  Phos  2.2     09-19  Mg     1.8     09-19    TPro  6.2  /  Alb  3.3<L>  /  TBili  0.5  /  DBili  x   /  AST  20  /  ALT  15  /  AlkPhos  79  09-19  Mg 1.8, Phos 2.2, lactate 1.1, lipase 12, amylase 50      Patient refusing imaging to r/o SBO.   Will keep NPO and IV fluids.   Received zofran earlier. Wrote for IV reglan.     Dr. Sin aware. Will inform Dr. Houston.

## 2019-09-20 NOTE — PROGRESS NOTE ADULT - ASSESSMENT
Will continue to monitor vital signs.   If no further episodes of vomiting possibly will try to advance diet tomorrow.     Dr. Sin at bedside. Will make Dr. Elias aware.

## 2019-09-20 NOTE — CHART NOTE - NSCHARTNOTEFT_GEN_A_CORE
Discussed plan of care with patient. Due to vomiting yesterday and overnight, decision made to recommend bloodwork and CT abdomen and pelvis. Patient aggressively refused CT, becoming angry and stating that she wants to be left alone. Discussed risk of bowel obstruction and ileus as well as other possible unknown post-operative complications. Patient accepted blood work but continued to refuse CT. Refusal of treatment signed with patient and Rn at bedside. During blood draw, lactate could not be drawn as vein collapsed during draw, patient refused additional needlestick, will proceed without lactate.    Discussed with Dr. Lee and Dr. Wayne Discussed plan of care with patient. Due to vomiting yesterday and overnight, decision made to recommend bloodwork and CT abdomen and pelvis. Discussed risk of bowel obstruction and ileus as well as other possible unknown post-operative complications. She agreed to proceed with blood draw and CT scan. During blood draw, lactate could not be drawn as vein collapsed during draw, patient refused additional needlestick, will proceed without lactate. Due to her left arm precautions, blood could only be drawn from same side as IV. IV fluids stopped prior to blood draw.    Discussed with Dr. Lee and Dr. Wayne Discussed plan of care with patient. Due to vomiting yesterday and overnight, decision made to recommend bloodwork and CT abdomen and pelvis. Discussed risk of bowel obstruction and ileus as well as other possible unknown post-operative complications. She agreed to proceed with blood draw and CT scan. During blood draw, lactate could not be drawn as vein collapsed during draw, patient refused additional needlestick, will proceed without lactate. Due to her left arm precautions, blood could only be drawn from same side as IV. IV fluids stopped prior to blood draw.    During discussion with patient, Rn informed that patient had a fever this AM. She denied feeling fever, chills, chest pain, SOB, dysuria, hematuria, calf pain. Patient is well appearing, lungs CTA bilaterally, heart sounds normal, no suprapubic tenderness, no calf tenderness.     Will continue to monitor for fevers  Will f/u CT abd/pelvis with IV contrast  Will f/u labs    Discussed with Dr. Lee and Dr. Wayne

## 2019-09-20 NOTE — PROGRESS NOTE ADULT - SUBJECTIVE AND OBJECTIVE BOX
PGY 4 Note    Patient examined at bedside, pain well controlled.  Reports 2 episodes of vomiting overnight, non bloody, bilious.  Reports continued flatus and BMs overnight.  Denies fevers/chills, HA/N/V, CP/SOB/palpitations, abdominal pain. Has minimal vaginal spotting.  NPO overnight, tolerating ice chips this AM, denies nausea at this time. Has been refusing work up to r/o obstruction.      T(F): 100.9 (09-20-19 @ 07:45), Max: 100.9 (09-20-19 @ 07:45)  HR: 106 (09-20-19 @ 07:45) (101 - 124)  BP: 124/68 (09-20-19 @ 07:45) (124/68 - 139/69)  RR: 18 (09-20-19 @ 07:45) (18 - 20)  SpO2: --    I&O's Summary    19 Sep 2019 07:01  -  20 Sep 2019 07:00  --------------------------------------------------------  IN: 995 mL / OUT: 60 mL / NET: 935 mL    Physical Exam:  General: NAD  CVS: RRR. Nl S1S2  Lungs: CTAB  Abdomen: soft, non-tender, non-distended, +BSx4  Incision: dressing changed, C/D/I, no erythema, no draining, areas of desquamation from tape after removal of dressings  VE: deferred, no bleeding on pad/chux  Ext: No edema. No calf tenderness.  SCDs in place    Labs:             10.6<L>  9.64  )-----------( 332      ( 09-19 @ 17:30 )             34.5<L>               10.0<L>  7.41  )-----------( 246      ( 09-17 @ 23:35 )             33.0<L>     09-19    141  |  104  |  12  ----------------------------<  135<H>  4.8   |  29  |  0.7    Ca    9.4      19 Sep 2019 17:30  Phos  2.2     09-19  Mg     1.8     09-19    TPro  6.2  /  Alb  3.3<L>  /  TBili  0.5  /  DBili  x   /  AST  20  /  ALT  15  /  AlkPhos  79  09-19            Trend:             10.6<L>  9.64  )-----------( 332      ( 09-19 @ 17:30 )             34.5<L>               10.0<L>  7.41  )-----------( 246      ( 09-17 @ 23:35 )             33.0<L>        Creatinine, Serum: 0.7 (09-19)  Creatinine, Serum: 0.9 (09-17)      Medications:  MEDICATIONS  (STANDING):  acetaminophen  IVPB .. 1000 milliGRAM(s) IV Intermittent once  enoxaparin Injectable 40 milliGRAM(s) SubCutaneous at bedtime  famotidine Injectable 20 milliGRAM(s) IV Push every 12 hours  ketorolac   Injectable 15 milliGRAM(s) IV Push every 6 hours  lactated ringers. 1000 milliLiter(s) (125 mL/Hr) IV Continuous <Continuous>  lactated ringers. 1000 milliLiter(s) (125 mL/Hr) IV Continuous <Continuous>  simethicone 80 milliGRAM(s) Chew every 4 hours    MEDICATIONS  (PRN):  ondansetron Injectable 8 milliGRAM(s) IV Push every 8 hours PRN Nausea and/or Vomiting  zolpidem 5 milliGRAM(s) Oral at bedtime PRN Insomnia

## 2019-09-20 NOTE — PROGRESS NOTE ADULT - SUBJECTIVE AND OBJECTIVE BOX
PGY3 progress note    Patient seen at bedside after called by nurse for tachycardia 129. Patient reports she is aggravated by her long stay at the hospital. Repeat vital signs normal:  bpm, /79, temp 100.0F. Patient reports no further episodes of vomiting since this afternoon.     Vital Signs Last 24 Hrs  T(C): 37.8 (20 Sep 2019 22:55), Max: 38.3 (20 Sep 2019 07:45)  T(F): 100 (20 Sep 2019 22:55), Max: 100.9 (20 Sep 2019 07:45)  HR: 114 (20 Sep 2019 22:55) (101 - 129)  BP: 125/79 (20 Sep 2019 22:55) (124/68 - 151/89)  BP(mean): --  RR: 20 (20 Sep 2019 22:55) (18 - 20)  SpO2: --    Labs:                        9.8    5.76  )-----------( 295      ( 20 Sep 2019 08:38 )             31.6         142  |  103  |  13  ----------------------------<  144<H>  4.3   |  27  |  0.7    Ca    8.9      20 Sep 2019 08:38  Phos  2.9       Mg     1.6         TPro  5.7<L>  /  Alb  3.0<L>  /  TBili  0.4  /  DBili  x   /  AST  14  /  ALT  13  /  AlkPhos  70        Imagin/20 CT abd/pelvis:  1.  Status post colectomy. Diffuse small bowel dilatation tapering to   normal caliber distally with no discrete transition point identified.   Findings may represent ileus versus early/developing small bowel   obstruction.  Oral contrast does not transit past proximal jejunum.  2.  Large hyperattenuating right lower quadrant intraperitoneal structure   with gas may represent hematoma-possibly infected versus surgical   hemostatic material. Correlation with operative report recommended.  3.  Extensive soft tissue emphysema extending from the chest size with   interfascial extension noted in the right thigh - unclear if completely   postsurgical. Correlation with operative report and physical exam is   recommended.

## 2019-09-21 LAB
ALBUMIN SERPL ELPH-MCNC: 3.1 G/DL — LOW (ref 3.5–5.2)
ALP SERPL-CCNC: 69 U/L — SIGNIFICANT CHANGE UP (ref 30–115)
ALT FLD-CCNC: 13 U/L — SIGNIFICANT CHANGE UP (ref 0–41)
ANION GAP SERPL CALC-SCNC: 13 MMOL/L — SIGNIFICANT CHANGE UP (ref 7–14)
AST SERPL-CCNC: 15 U/L — SIGNIFICANT CHANGE UP (ref 0–41)
BILIRUB SERPL-MCNC: 0.6 MG/DL — SIGNIFICANT CHANGE UP (ref 0.2–1.2)
BUN SERPL-MCNC: 23 MG/DL — HIGH (ref 10–20)
CALCIUM SERPL-MCNC: 9.2 MG/DL — SIGNIFICANT CHANGE UP (ref 8.5–10.1)
CHLORIDE SERPL-SCNC: 99 MMOL/L — SIGNIFICANT CHANGE UP (ref 98–110)
CO2 SERPL-SCNC: 31 MMOL/L — SIGNIFICANT CHANGE UP (ref 17–32)
CREAT SERPL-MCNC: 0.8 MG/DL — SIGNIFICANT CHANGE UP (ref 0.7–1.5)
GLUCOSE SERPL-MCNC: 147 MG/DL — HIGH (ref 70–99)
HCT VFR BLD CALC: 31.2 % — LOW (ref 37–47)
HGB BLD-MCNC: 9.6 G/DL — LOW (ref 12–16)
MAGNESIUM SERPL-MCNC: 1.9 MG/DL — SIGNIFICANT CHANGE UP (ref 1.8–2.4)
MCHC RBC-ENTMCNC: 24.9 PG — LOW (ref 27–31)
MCHC RBC-ENTMCNC: 30.8 G/DL — LOW (ref 32–37)
MCV RBC AUTO: 80.8 FL — LOW (ref 81–99)
NRBC # BLD: 0 /100 WBCS — SIGNIFICANT CHANGE UP (ref 0–0)
PHOSPHATE SERPL-MCNC: 4.4 MG/DL — SIGNIFICANT CHANGE UP (ref 2.1–4.9)
PLATELET # BLD AUTO: 358 K/UL — SIGNIFICANT CHANGE UP (ref 130–400)
POTASSIUM SERPL-MCNC: 4.1 MMOL/L — SIGNIFICANT CHANGE UP (ref 3.5–5)
POTASSIUM SERPL-SCNC: 4.1 MMOL/L — SIGNIFICANT CHANGE UP (ref 3.5–5)
PROT SERPL-MCNC: 5.9 G/DL — LOW (ref 6–8)
RBC # BLD: 3.86 M/UL — LOW (ref 4.2–5.4)
RBC # FLD: 15.7 % — HIGH (ref 11.5–14.5)
SODIUM SERPL-SCNC: 143 MMOL/L — SIGNIFICANT CHANGE UP (ref 135–146)
WBC # BLD: 4.38 K/UL — LOW (ref 4.8–10.8)
WBC # FLD AUTO: 4.38 K/UL — LOW (ref 4.8–10.8)

## 2019-09-21 PROCEDURE — 99024 POSTOP FOLLOW-UP VISIT: CPT

## 2019-09-21 RX ORDER — MAGNESIUM SULFATE 500 MG/ML
2 VIAL (ML) INJECTION ONCE
Refills: 0 | Status: COMPLETED | OUTPATIENT
Start: 2019-09-21 | End: 2019-09-21

## 2019-09-21 RX ADMIN — ONDANSETRON 8 MILLIGRAM(S): 8 TABLET, FILM COATED ORAL at 17:50

## 2019-09-21 RX ADMIN — ONDANSETRON 8 MILLIGRAM(S): 8 TABLET, FILM COATED ORAL at 01:17

## 2019-09-21 RX ADMIN — Medication 15 MILLIGRAM(S): at 13:03

## 2019-09-21 RX ADMIN — SODIUM CHLORIDE 125 MILLILITER(S): 9 INJECTION, SOLUTION INTRAVENOUS at 10:08

## 2019-09-21 RX ADMIN — Medication 15 MILLIGRAM(S): at 00:31

## 2019-09-21 RX ADMIN — Medication 15 MILLIGRAM(S): at 17:27

## 2019-09-21 RX ADMIN — Medication 15 MILLIGRAM(S): at 05:13

## 2019-09-21 RX ADMIN — Medication 15 MILLIGRAM(S): at 00:30

## 2019-09-21 RX ADMIN — ONDANSETRON 8 MILLIGRAM(S): 8 TABLET, FILM COATED ORAL at 10:07

## 2019-09-21 RX ADMIN — Medication 15 MILLIGRAM(S): at 13:04

## 2019-09-21 RX ADMIN — PANTOPRAZOLE SODIUM 40 MILLIGRAM(S): 20 TABLET, DELAYED RELEASE ORAL at 13:04

## 2019-09-21 RX ADMIN — ENOXAPARIN SODIUM 40 MILLIGRAM(S): 100 INJECTION SUBCUTANEOUS at 22:55

## 2019-09-21 NOTE — PROGRESS NOTE ADULT - ASSESSMENT
48yo P1 h/o ulcerative colitis with total colectomy, h/o DCIS s/p lumpectomy with radiation, h/o unprovoked PE with prothrombin mutation (heterozygous), s/p RATLH, BSO, cystoscopy, for fibroid uterus and pelvic pain EBL 250cc  POD #4, with possible ileus  GI: several episodes of vomiting with +BM and +flatus, possible obstruction, nl lactate, NPO overnight, tolerating ice chips this AM, refusing blood work and imaging, stressed importance of r/o bowel obstruction pt still declining at this time, will discuss refusal of treatment and sign  DVT prophylaxis: lovenox daily, ambulation  Neuro: pain control, continue with tylenol/ibuprofen/oxycodone  Cardio: no dx  Pulm: incentive spirometer at bedside in plastic, encouraged use, ambulation  ID: s/p gent/clinda x2 doses postop for prophylaxis, afebrile  Endo: no dx  Heme: H/H post op 10/33, refusing more labs  : voiding, minimal vaginal spotting pink    Dispo: must r/o obstruction and tolerate regular diet prior to discharge    Will inform Dr Wayne 48yo P1 h/o ulcerative colitis with total colectomy, h/o DCIS s/p lumpectomy with radiation, h/o unprovoked PE with prothrombin mutation (heterozygous), s/p RATLH, BSO, cystoscopy, for fibroid uterus and pelvic pain EBL 250cc  POD #4, with possible resolving ileus    GI: several episodes of vomiting, very small volumes with +BM and +flatus, NPO overnight, tolerating ice chips. Glucose has been elevated on CMPs, we suspect that patient may be eating through the night. Overall, suspicion for ileus or SBO is very low. Will advance diet as tolerated.  DVT prophylaxis: lovenox daily, encouraged ambulation  Neuro: Pain control adequate, continue with tylenol/ibuprofen/oxycodone  Pulm: incentive spirometer at bedside in plastic, encouraged use, ambulation  ID: s/p gent/clinda x2 doses postop for prophylaxis, afebrile  Heme: H/H stable    Discussed with Dr. Wayne 50yo P1 h/o ulcerative colitis with total colectomy, h/o DCIS s/p lumpectomy with radiation, h/o unprovoked PE with prothrombin mutation (heterozygous), s/p RATLH, BSO, cystoscopy, for fibroid uterus and pelvic pain EBL 250cc  POD #4, with possible resolving ileus    GI: several episodes of vomiting, very small volumes with +BM and +flatus, NPO overnight, tolerating ice chips. Glucose has been elevated on CMPs, we suspect that patient may be eating through the night. Overall, suspicion for SBO is very low. Will advance diet as tolerated. GI prophylaxis  DVT prophylaxis: lovenox daily, SCDs while in bed, encouraged ambulation  Neuro: Pain control adequate, continue with tylenol/ibuprofen/oxycodone  Pulm: incentive spirometer at bedside in plastic, encouraged use, ambulation  ID: s/p gent/clinda x2 doses postop for prophylaxis, afebrile, normal WBC  Heme: H/H stable    Discussed with Dr. Wayne

## 2019-09-21 NOTE — PROGRESS NOTE ADULT - SUBJECTIVE AND OBJECTIVE BOX
Patient evaluated multiple times throughout the day. Despite being NPO, patient has been eating cookies and drinking for the past few days. She has many snacks at the bedside. Informed patient that she likely has an ileus an that eating is worsening her condition, causing more bloating and pain. She feels bloated and has been tachycardic due to the discomfort. She has ambulated several times around the unit, being walked by residents, nurses, and PCAs.    She denies vomiting, still feels nausea. She reports multiple bowel movements and passing frequent gas. I suspect that the patient is passing only small amounts of gas and stool rather than normal bowel movements.    Vital Signs Last 24 Hrs  T(F): 98.9 (21 Sep 2019 08:00), Max: 100 (20 Sep 2019 22:55)  HR: 109 (21 Sep 2019 08:00) (109 - 129)  BP: 142/70 (21 Sep 2019 08:00) (125/79 - 151/89)  RR: 18 (21 Sep 2019 08:00) (18 - 20)    Manual     Gen: AAOx3, mildly uncomfortable  Chest: S1S2, RRR, lungs CTA BL  Abd: Soft, nontender, moderately distended (slightly more distended than this morning), BS+      Encouraged patient multiple times to remain NPO. Patient strongly desires to go home, but in the setting of likely ileus, patient should remain NPO until she improves. Will continue to monitor closely.    Discussed with Dr. Wayne Patient evaluated multiple times throughout the day. Despite being NPO, patient has been eating cookies and drinking for the past few days. She has many snacks at the bedside. Informed patient that she likely has an ileus and that eating is worsening her condition, causing more bloating and pain. She feels bloated and has been tachycardic due to the discomfort. She has ambulated several times around the unit, being walked by residents, nurses, and PCAs.    She denies vomiting, still feels nausea. She reports multiple bowel movements and passing frequent gas.     Vital Signs Last 24 Hrs  T(F): 98.9 (21 Sep 2019 08:00), Max: 100 (20 Sep 2019 22:55)  HR: 109 (21 Sep 2019 08:00) (109 - 129)  BP: 142/70 (21 Sep 2019 08:00) (125/79 - 151/89)  RR: 18 (21 Sep 2019 08:00) (18 - 20)    Manual     Gen: AAOx3, mildly uncomfortable  Chest: S1S2, RRR, lungs CTA BL  Abd: Soft, nontender, moderately distended (slightly more distended than this morning), BS+      Encouraged patient multiple times to remain NPO. Patient strongly desires to go home, but in the setting of likely ileus, patient should remain NPO until she improves. Will continue to monitor closely.    Discussed with Dr. Wayne

## 2019-09-21 NOTE — PROGRESS NOTE ADULT - SUBJECTIVE AND OBJECTIVE BOX
Chief Complaint:     HPI: Pt doing well, denies any episodes of vomiting overnight but still has bouts of nausea relieved by zofran. She Is hungry and wants to eat despite the nausea. She finds the bed very uncomfortable and wants to go home.    ROS: Denies cardiovascular or respiratory symptoms    PAST MEDICAL & SURGICAL HISTORY:  OA (osteoarthritis)  Anxiety  Breast cancer: left breast cancer,, lumpectomy&amp;  radiation (~2017)  S/P colectomy: hx ulcerative colitis  Liver disease, hemangioma  MVP (mitral valve prolapse)  Bell palsy  Pulmonary embolism: &gt; 5 yrs ago, no current meds, pt is unsure of cause  Breast cancer: left side, lumpectomy  H/O colectomy: h/o colostomy reversal pt states pouch is on the left side  S/P lumpectomy, left breast      Physical Exam  Vital Signs Last 24 Hrs  T(F): 98.5 (21 Sep 2019 05:00), Max: 100.9 (20 Sep 2019 07:45)  HR: 112 (21 Sep 2019 05:00) (101 - 129)  BP: 140/86 (21 Sep 2019 05:00) (124/68 - 151/89)  RR: 20 (21 Sep 2019 05:00) (18 - 20)    Physical exam:  General - AAOx3, NAD  Heart - S1S2, RRR  Lungs - CTA BL  Abdomen:  - Soft, appropriately tender, nondistended, BS+  - Clean, dry, intact robotic skin incisions x5  Pelvis/Vagina - No bleeding  Extremities - No calf tenderness, no swelling    Labs:                        9.6    4.38  )-----------( 358      ( 21 Sep 2019 06:25 )             31.2                         9.8    5.76  )-----------( 295      ( 20 Sep 2019 08:38 )             31.6     142  |  103  |  13  ----------------------------<144  4.3  |  27  |  0.7    Magnesium, Serum: 1.6 mg/dL (09-20-19 @ 08:38)    Phosphorus Level, Serum: 2.9 mg/dL (09-20-19 @ 08:38)      Antibody Screen: NEG (09-17-19 @ 07:15) Chief Complaint:     HPI: Pt doing well, denies any episodes of vomiting overnight but still has bouts of nausea relieved by zofran. She Is hungry and wants to eat despite the nausea. She has had bowel movements and passing flatus. She finds the bed very uncomfortable and wants to go home.    ROS: Denies cardiovascular or respiratory symptoms    PAST MEDICAL & SURGICAL HISTORY:  OA (osteoarthritis)  Anxiety  Breast cancer: left breast cancer,, lumpectomy&amp;  radiation (~2017)  S/P colectomy: hx ulcerative colitis  Liver disease, hemangioma  MVP (mitral valve prolapse)  Bell palsy  Pulmonary embolism: &gt; 5 yrs ago, no current meds, pt is unsure of cause  Breast cancer: left side, lumpectomy  H/O colectomy: h/o colostomy reversal pt states pouch is on the left side  S/P lumpectomy, left breast      Physical Exam  Vital Signs Last 24 Hrs  T(F): 98.5 (21 Sep 2019 05:00), Max: 100.9 (20 Sep 2019 07:45)  HR: 112 (21 Sep 2019 05:00) (101 - 129)  BP: 140/86 (21 Sep 2019 05:00) (124/68 - 151/89)  RR: 20 (21 Sep 2019 05:00) (18 - 20)    Physical exam:  General - AAOx3, NAD  Heart - S1S2, RRR  Lungs - CTA BL  Abdomen:  - Soft, appropriately tender, nondistended, BS+  - Clean, dry, intact robotic skin incisions x5  Pelvis/Vagina - No bleeding  Extremities - No calf tenderness, no swelling    Labs:                        9.6    4.38  )-----------( 358      ( 21 Sep 2019 06:25 )             31.2                         9.8    5.76  )-----------( 295      ( 20 Sep 2019 08:38 )             31.6     09-21    143  |  99  |  23<H>  ----------------------------<  147<H>  4.1   |  31  |  0.8    Ca    9.2      21 Sep 2019 06:25  Phos  4.4     09-21  Mg     1.9     09-21    TPro  5.9<L>  /  Alb  3.1<L>  /  TBili  0.6  /  DBili  x   /  AST  15  /  ALT  13  /  AlkPhos  69  09-21    Type + Screen (09.17.19 @ 07:15)    ABO RH Interpretation: O NEG  Antibody Screen: NEG (09-17-19 @ 07:15) Chief Complaint:     HPI: Pt doing well, denies any episodes of vomiting overnight but still has bouts of nausea relieved by zofran. She Is hungry and wants to eat despite the nausea. She has had bowel movements and passing flatus. She finds the bed very uncomfortable and wants to go home.    ROS: Denies cardiovascular or respiratory symptoms    PAST MEDICAL & SURGICAL HISTORY:  OA (osteoarthritis)  Anxiety  Breast cancer: left breast cancer,, lumpectomy&amp;  radiation (~2017)  S/P colectomy: hx ulcerative colitis  Liver disease, hemangioma  MVP (mitral valve prolapse)  Bell palsy  Pulmonary embolism: &gt; 5 yrs ago, no current meds, pt is unsure of cause  Breast cancer: left side, lumpectomy  H/O colectomy: h/o colostomy reversal pt states pouch is on the left side  S/P lumpectomy, left breast      Physical Exam  Vital Signs Last 24 Hrs  T(F): 98.5 (21 Sep 2019 05:00), Max: 100.9 (20 Sep 2019 07:45)  HR: 112 (21 Sep 2019 05:00) (101 - 129)  BP: 140/86 (21 Sep 2019 05:00) (124/68 - 151/89)  RR: 20 (21 Sep 2019 05:00) (18 - 20)    Physical exam:  General - AAOx3, NAD  Heart - S1S2, RRR  Lungs - CTA BL  Abdomen:  - Soft, appropriately tender, mildly distended, BS+  - Clean, dry, intact robotic skin incisions x5  Pelvis/Vagina - No bleeding  Extremities - No calf tenderness, no swelling    Labs:                        9.6    4.38  )-----------( 358      ( 21 Sep 2019 06:25 )             31.2                         9.8    5.76  )-----------( 295      ( 20 Sep 2019 08:38 )             31.6     09-21    143  |  99  |  23<H>  ----------------------------<  147<H>  4.1   |  31  |  0.8    Ca    9.2      21 Sep 2019 06:25  Phos  4.4     09-21  Mg     1.9     09-21    TPro  5.9<L>  /  Alb  3.1<L>  /  TBili  0.6  /  DBili  x   /  AST  15  /  ALT  13  /  AlkPhos  69  09-21    Type + Screen (09.17.19 @ 07:15)    ABO RH Interpretation: O NEG  Antibody Screen: NEG (09-17-19 @ 07:15)

## 2019-09-21 NOTE — PROGRESS NOTE ADULT - ATTENDING COMMENTS
saw pt at bedside in the am.  ct scan showed distended bowel; unlikely to be sbo, most likely ileus. collection noted on pelvis, evacil was used during the procedure, stable h/h, afebrile for >24 hours. pt reported some vomiting, small volume, not more than 50 cc usually. nausea. but +BS and flatus, and bowel movements. abdomen distended, no peritoneal signs, wound healing well. pt had been placed NPO 2 nights ago and has been on LR since. All medications switched to IV. Pt questioned about PO intake since glucose is consistently elevated on CMP. Pt admits to drinking cold sugary drinks after which she usually feels worse and has nausea. discussed with pt importance of bowel rest to allow the ileus to resolve. she should avoid food or drinks and specially to avoid the excess sugar she has been having. pt reports understanding and will remain npo from here on. will reassess later today.

## 2019-09-22 LAB — GAS PNL BLDA: SIGNIFICANT CHANGE UP

## 2019-09-22 PROCEDURE — 93970 EXTREMITY STUDY: CPT | Mod: 26

## 2019-09-22 PROCEDURE — 99024 POSTOP FOLLOW-UP VISIT: CPT

## 2019-09-22 PROCEDURE — 93010 ELECTROCARDIOGRAM REPORT: CPT

## 2019-09-22 PROCEDURE — 99232 SBSQ HOSP IP/OBS MODERATE 35: CPT

## 2019-09-22 PROCEDURE — 71275 CT ANGIOGRAPHY CHEST: CPT | Mod: 26

## 2019-09-22 PROCEDURE — 71045 X-RAY EXAM CHEST 1 VIEW: CPT | Mod: 26

## 2019-09-22 RX ORDER — SODIUM CHLORIDE 9 MG/ML
1000 INJECTION INTRAMUSCULAR; INTRAVENOUS; SUBCUTANEOUS
Refills: 0 | Status: DISCONTINUED | OUTPATIENT
Start: 2019-09-22 | End: 2019-09-23

## 2019-09-22 RX ORDER — BENZOCAINE 10 %
1 GEL (GRAM) MUCOUS MEMBRANE EVERY 4 HOURS
Refills: 0 | Status: DISCONTINUED | OUTPATIENT
Start: 2019-09-22 | End: 2019-10-01

## 2019-09-22 RX ORDER — SODIUM CHLORIDE 9 MG/ML
1000 INJECTION INTRAMUSCULAR; INTRAVENOUS; SUBCUTANEOUS ONCE
Refills: 0 | Status: COMPLETED | OUTPATIENT
Start: 2019-09-22 | End: 2019-09-22

## 2019-09-22 RX ADMIN — ONDANSETRON 8 MILLIGRAM(S): 8 TABLET, FILM COATED ORAL at 11:22

## 2019-09-22 RX ADMIN — PANTOPRAZOLE SODIUM 40 MILLIGRAM(S): 20 TABLET, DELAYED RELEASE ORAL at 11:20

## 2019-09-22 RX ADMIN — Medication 15 MILLIGRAM(S): at 17:39

## 2019-09-22 RX ADMIN — ENOXAPARIN SODIUM 40 MILLIGRAM(S): 100 INJECTION SUBCUTANEOUS at 21:25

## 2019-09-22 RX ADMIN — SODIUM CHLORIDE 125 MILLILITER(S): 9 INJECTION INTRAMUSCULAR; INTRAVENOUS; SUBCUTANEOUS at 15:16

## 2019-09-22 RX ADMIN — ONDANSETRON 8 MILLIGRAM(S): 8 TABLET, FILM COATED ORAL at 19:43

## 2019-09-22 RX ADMIN — Medication 15 MILLIGRAM(S): at 17:50

## 2019-09-22 RX ADMIN — Medication 15 MILLIGRAM(S): at 00:11

## 2019-09-22 RX ADMIN — SODIUM CHLORIDE 1000 MILLILITER(S): 9 INJECTION INTRAMUSCULAR; INTRAVENOUS; SUBCUTANEOUS at 15:16

## 2019-09-22 RX ADMIN — ONDANSETRON 8 MILLIGRAM(S): 8 TABLET, FILM COATED ORAL at 01:29

## 2019-09-22 RX ADMIN — Medication 1 SPRAY(S): at 19:53

## 2019-09-22 RX ADMIN — Medication 15 MILLIGRAM(S): at 05:02

## 2019-09-22 RX ADMIN — Medication 15 MILLIGRAM(S): at 11:26

## 2019-09-22 RX ADMIN — Medication 50 GRAM(S): at 00:11

## 2019-09-22 RX ADMIN — Medication 15 MILLIGRAM(S): at 11:20

## 2019-09-22 NOTE — PROGRESS NOTE ADULT - ASSESSMENT
50yo P1 h/o UC with total colectomy, h/o DCIS s/p lumpectomy with radiation, h/o unprovocked PE found to have prothrombin mutation (heterozygous), with fibroid uterus and pelvic pain s/p RATLH, BSO, cystoscopy, EBL 250cc, POD #5, complicated by postoperative ileus, improving bowel function, asymptomatic tachycardia, r/o DVT    -stat EKG and LE duplex ordered  -advance diet at lunch to clears   -ambulation  -pain management   -SCDs when in bed  -lovenox qD  -pulse ox with vital signs  -reassess    Dr. Edward aware

## 2019-09-22 NOTE — CHART NOTE - NSCHARTNOTEFT_GEN_A_CORE
PGY 4 NOTE:    Nasogastric Tube Placement    NG Tube placed to 60cm. 2350cc bilious fluid removed. Patient immediately felt better. Will leave to low continuous suction for now. CXR ordered for placement.     Dr. Wayne aware PGY 4 NOTE:    Nasogastric Tube Placement    Assisted by General Surgery. NG Tube placed to 60cm. 2350cc bilious fluid removed. Patient immediately felt better. Will leave to low continuous suction for now. CXR ordered for placement.     Dr. Wayne aware

## 2019-09-22 NOTE — CONSULT NOTE ADULT - ASSESSMENT
49 year old female s/p laparoscopic total hysterectomy with salpingo-oophorectomy on 9/17 with ileus  NGT placed, 2L out initially   keep npo  NG tube to low continuous suction  monitor output  will follow    d/w Dr. Bunch

## 2019-09-22 NOTE — CONSULT NOTE ADULT - SUBJECTIVE AND OBJECTIVE BOX
HPI: 49 year old female s/p Laparoscopic total hysterectomy with salpingo-oophorectomy on 9/17. Patient now with abdominal distention and vomiting x 4 days. +BM/flatus. CT negative for PE, evidence of distended stomach with no discrete transition point.      PAST MEDICAL & SURGICAL HISTORY:  OA (osteoarthritis)  Anxiety  Breast cancer: left breast cancer,, lumpectomy&amp;  radiation (~2017)  S/P colectomy: hx ulcerative colitis  Liver disease: &quot;twisted liver&quot;, hemangioma  MVP (mitral valve prolapse)  Bell palsy  Pulmonary embolism: &gt; 5 yrs ago, no current meds, pt is unsure of cause  Breast cancer: left side, lumpectomy  H/O colectomy: colostom&amp; reversal pt sttaes pouch is on the left side  S/P lumpectomy, left breast      MEDICATIONS  (STANDING):  acetaminophen  IVPB .. 1000 milliGRAM(s) IV Intermittent once  benzocaine 20% Spray 1 Spray(s) Topical every 4 hours  enoxaparin Injectable 40 milliGRAM(s) SubCutaneous at bedtime  ketorolac   Injectable 15 milliGRAM(s) IV Push every 6 hours  lactated ringers. 1000 milliLiter(s) (125 mL/Hr) IV Continuous <Continuous>  pantoprazole  Injectable 40 milliGRAM(s) IV Push daily  sodium chloride 0.9%. 1000 milliLiter(s) (125 mL/Hr) IV Continuous <Continuous>    MEDICATIONS  (PRN):  ondansetron Injectable 8 milliGRAM(s) IV Push every 8 hours PRN Nausea and/or Vomiting      Allergies  codeine (Vomiting; Nausea)  latex (Anaphylaxis)  penicillin (Other)      REVIEW OF SYSTEMS    [x] A ten-point review of systems was otherwise negative except as noted.  [ ] Due to altered mental status/intubation, subjective information were not able to be obtained from the patient. History was obtained, to the extent possible, from review of the chart and collateral sources of information.      Vital Signs Last 24 Hrs  T(C): 38.1 (22 Sep 2019 16:15), Max: 38.1 (22 Sep 2019 16:15)  T(F): 100.5 (22 Sep 2019 16:15), Max: 100.5 (22 Sep 2019 16:15)  HR: 122 (22 Sep 2019 16:15) (103 - 122)  BP: 136/77 (22 Sep 2019 16:15) (135/75 - 139/90)  RR: 18 (22 Sep 2019 16:15) (14 - 19)  SpO2: 95% (22 Sep 2019 13:48) (95% - 96%)    PHYSICAL EXAM:  GENERAL: NAD, well-appearing  CHEST/LUNG: Clear to auscultation bilaterally  HEART: Regular rate and rhythm  ABDOMEN: Soft, distended, mild tenderness  EXTREMITIES:  No clubbing, cyanosis, or edema      LABS:                      9.6    4.38  )-----------( 358      ( 21 Sep 2019 06:25 )             31.2         09-21    143  |  99  |  23<H>  ----------------------------<  147<H>  4.1   |  31  |  0.8          LFTs:             5.9  | 0.6  | 15       ------------------[69      ( 21 Sep 2019 06:25 )  3.1  | x    | 13             Blood Gas Arterial, Lactate: 1.2 mmoL/L (09-22-19 @ 13:31)    ABG - ( 22 Sep 2019 13:31 )  pH: 7.55  /  pCO2: 40    /  pO2: 70    / HCO3: 35    / Base Excess: 11.5  /  SaO2: 95            RADIOLOGY & ADDITIONAL STUDIES:    < from: CT Abdomen and Pelvis w/ Oral Cont and w/ IV Cont (09.20.19 @ 14:18) >  IMPRESSION:  1.  Status post colectomy. Diffuse small bowel dilatation tapering to   normal caliber distally with no discrete transition point identified.   Findings may represent ileus versus early/developing small bowel   obstruction.  Oral contrast does not transit past proximal jejunum.  2.  Large hyperattenuating right lower quadrant intraperitoneal structure   with gas may represent hematoma-possibly infected versus surgical   hemostatic material. Correlation with operative report recommended.  3.  Extensive soft tissue emphysema extending from the chest size with   interfascial extension noted in the right thigh - unclear if completely   postsurgical. Correlation with operative report and physical exam is   recommended.  < end of copied text >    < from: CT Angio Chest w/ IV Cont (09.22.19 @ 16:05) >  IMPRESSION:  No evidence for acute pulmonary embolus.  Marked distention of the stomach with air and fluid.  Anterior chest wall and bilateral flank subcutaneous air partially imaged   and overall stable since prior exam.  < end of copied text >

## 2019-09-22 NOTE — PROGRESS NOTE ADULT - SUBJECTIVE AND OBJECTIVE BOX
PGY 4 note    Patient seen at bedside and evaluated.  +Flatus and +BM.  NO vomiting overnight.  Tolerating water overnight.  NO fever/chills, nausea, diarrhea, constipation, dysuria, chest pain, SOB, palpitations.  No abdominal pain.    VS  ICU Vital Signs Last 24 Hrs  T(C): 37.1 (22 Sep 2019 07:51), Max: 37.5 (22 Sep 2019 00:00)  T(F): 98.7 (22 Sep 2019 07:51), Max: 99.5 (22 Sep 2019 00:00)  HR: 110 (22 Sep 2019 07:51) (110 - 122)  BP: 136/77 (22 Sep 2019 07:51) (135/75 - 149/88)  BP(mean): --  ABP: --  ABP(mean): --  RR: 19 (22 Sep 2019 08:39) (14 - 19)  SpO2: 96% (22 Sep 2019 08:39) (96% - 96%)    GEN: NAD, AAO x 3  Heart: tachycardic, regular rhythm  Lungs: CTAB  Abd: soft, nontender, mildly distended, +BS, no r/g/r, incisions c/d/i  SVE: deferred, no bleeding on pad  Ext: no calf tenderness or edema    labs  9/21: 4.3>9.6/31.2<358, 143/4.1/99/31/23/0.8<147, AST/ALT 15/13, Mag 1.9, Phos 4.4    medications  MEDICATIONS  (STANDING):  acetaminophen  IVPB .. 1000 milliGRAM(s) IV Intermittent once  enoxaparin Injectable 40 milliGRAM(s) SubCutaneous at bedtime  ketorolac   Injectable 15 milliGRAM(s) IV Push every 6 hours  lactated ringers. 1000 milliLiter(s) (125 mL/Hr) IV Continuous <Continuous>  lactated ringers. 1000 milliLiter(s) (125 mL/Hr) IV Continuous <Continuous>  pantoprazole  Injectable 40 milliGRAM(s) IV Push daily    MEDICATIONS  (PRN):  ondansetron Injectable 8 milliGRAM(s) IV Push every 8 hours PRN Nausea and/or Vomiting    medications given  enoxaparin Injectable   40 milliGRAM(s) SubCutaneous (09-21-19 @ 22:55)    ketorolac   Injectable   15 milliGRAM(s) IV Push (09-21-19 @ 13:03)   15 milliGRAM(s) IV Push (09-21-19 @ 17:27)   15 milliGRAM(s) IV Push (09-22-19 @ 00:11)   15 milliGRAM(s) IV Push (09-22-19 @ 05:02)    magnesium sulfate  IVPB   50 mL/Hr IV Intermittent (09-22-19 @ 00:11)    ondansetron Injectable   8 milliGRAM(s) IV Push (09-21-19 @ 10:07)   8 milliGRAM(s) IV Push (09-21-19 @ 17:50)   8 milliGRAM(s) IV Push (09-22-19 @ 01:29)    pantoprazole  Injectable   40 milliGRAM(s) IV Push (09-21-19 @ 13:04)

## 2019-09-22 NOTE — PROGRESS NOTE ADULT - SUBJECTIVE AND OBJECTIVE BOX
PGY3 OBGYN Note:     In view of tachycardia; EKG ordered-> sinus tachycardia, with possible LA enlargement; cannot r/o ant infarct on read. Reviewed by anesthesia; appears normal but recommended ABG to determine A-a gradient in view of previous h/o PE.   ABG: showed metabolic alkalosis with hypokalemia (K 3.2);   ABG - ( 22 Sep 2019 13:31 )  pH, Arterial: 7.55  pH, Blood: x     /  pCO2: 40    /  pO2: 70    / HCO3: 35    / Base Excess: 11.5  /  SaO2: 95        Per pulmonology-> PO2 is okay as patient has normal O2 sat on room air. ABG consistent with dehydration, but would do CT angio in view of prior history.     CT angio- neg for PE.     Plan:   - IV bolus; switch fluids to NS   - repeat labs in AM   - monitor vitals     Dr. Barbie Wayne aware.

## 2019-09-23 LAB
ALBUMIN SERPL ELPH-MCNC: 2.8 G/DL — LOW (ref 3.5–5.2)
ALP SERPL-CCNC: 80 U/L — SIGNIFICANT CHANGE UP (ref 30–115)
ALT FLD-CCNC: 15 U/L — SIGNIFICANT CHANGE UP (ref 0–41)
ANION GAP SERPL CALC-SCNC: 14 MMOL/L — SIGNIFICANT CHANGE UP (ref 7–14)
ANION GAP SERPL CALC-SCNC: 16 MMOL/L — HIGH (ref 7–14)
APTT BLD: 35.7 SEC — SIGNIFICANT CHANGE UP (ref 27–39.2)
AST SERPL-CCNC: 19 U/L — SIGNIFICANT CHANGE UP (ref 0–41)
BASOPHILS # BLD AUTO: 0.03 K/UL — SIGNIFICANT CHANGE UP (ref 0–0.2)
BASOPHILS # BLD AUTO: 0.04 K/UL — SIGNIFICANT CHANGE UP (ref 0–0.2)
BASOPHILS NFR BLD AUTO: 0.3 % — SIGNIFICANT CHANGE UP (ref 0–1)
BASOPHILS NFR BLD AUTO: 0.3 % — SIGNIFICANT CHANGE UP (ref 0–1)
BILIRUB SERPL-MCNC: 0.6 MG/DL — SIGNIFICANT CHANGE UP (ref 0.2–1.2)
BUN SERPL-MCNC: 36 MG/DL — HIGH (ref 10–20)
BUN SERPL-MCNC: 39 MG/DL — HIGH (ref 10–20)
CALCIUM SERPL-MCNC: 7.7 MG/DL — LOW (ref 8.5–10.1)
CALCIUM SERPL-MCNC: 8 MG/DL — LOW (ref 8.5–10.1)
CHLORIDE SERPL-SCNC: 93 MMOL/L — LOW (ref 98–110)
CHLORIDE SERPL-SCNC: 97 MMOL/L — LOW (ref 98–110)
CK MB CFR SERPL CALC: 1.3 NG/ML — SIGNIFICANT CHANGE UP (ref 0.6–6.3)
CK SERPL-CCNC: 107 U/L — SIGNIFICANT CHANGE UP (ref 0–225)
CO2 SERPL-SCNC: 30 MMOL/L — SIGNIFICANT CHANGE UP (ref 17–32)
CO2 SERPL-SCNC: 37 MMOL/L — HIGH (ref 17–32)
CREAT SERPL-MCNC: 0.9 MG/DL — SIGNIFICANT CHANGE UP (ref 0.7–1.5)
CREAT SERPL-MCNC: 1.1 MG/DL — SIGNIFICANT CHANGE UP (ref 0.7–1.5)
EOSINOPHIL # BLD AUTO: 0.07 K/UL — SIGNIFICANT CHANGE UP (ref 0–0.7)
EOSINOPHIL # BLD AUTO: 0.1 K/UL — SIGNIFICANT CHANGE UP (ref 0–0.7)
EOSINOPHIL NFR BLD AUTO: 0.8 % — SIGNIFICANT CHANGE UP (ref 0–8)
EOSINOPHIL NFR BLD AUTO: 0.9 % — SIGNIFICANT CHANGE UP (ref 0–8)
GAS PNL BLDA: SIGNIFICANT CHANGE UP
GLUCOSE SERPL-MCNC: 118 MG/DL — HIGH (ref 70–99)
GLUCOSE SERPL-MCNC: 132 MG/DL — HIGH (ref 70–99)
HCT VFR BLD CALC: 29.4 % — LOW (ref 37–47)
HCT VFR BLD CALC: 31.7 % — LOW (ref 37–47)
HGB BLD-MCNC: 10 G/DL — LOW (ref 12–16)
HGB BLD-MCNC: 9.2 G/DL — LOW (ref 12–16)
IMM GRANULOCYTES NFR BLD AUTO: 1 % — HIGH (ref 0.1–0.3)
IMM GRANULOCYTES NFR BLD AUTO: 1 % — HIGH (ref 0.1–0.3)
INR BLD: 1.19 RATIO — SIGNIFICANT CHANGE UP (ref 0.65–1.3)
LACTATE SERPL-SCNC: 1.2 MMOL/L — SIGNIFICANT CHANGE UP (ref 0.5–2.2)
LACTATE SERPL-SCNC: 1.3 MMOL/L — SIGNIFICANT CHANGE UP (ref 0.5–2.2)
LYMPHOCYTES # BLD AUTO: 0.91 K/UL — LOW (ref 1.2–3.4)
LYMPHOCYTES # BLD AUTO: 0.94 K/UL — LOW (ref 1.2–3.4)
LYMPHOCYTES # BLD AUTO: 10.3 % — LOW (ref 20.5–51.1)
LYMPHOCYTES # BLD AUTO: 8.2 % — LOW (ref 20.5–51.1)
MAGNESIUM SERPL-MCNC: 2.4 MG/DL — SIGNIFICANT CHANGE UP (ref 1.8–2.4)
MAGNESIUM SERPL-MCNC: 2.7 MG/DL — HIGH (ref 1.8–2.4)
MCHC RBC-ENTMCNC: 24.8 PG — LOW (ref 27–31)
MCHC RBC-ENTMCNC: 24.9 PG — LOW (ref 27–31)
MCHC RBC-ENTMCNC: 31.3 G/DL — LOW (ref 32–37)
MCHC RBC-ENTMCNC: 31.5 G/DL — LOW (ref 32–37)
MCV RBC AUTO: 78.9 FL — LOW (ref 81–99)
MCV RBC AUTO: 79.2 FL — LOW (ref 81–99)
MONOCYTES # BLD AUTO: 0.99 K/UL — HIGH (ref 0.1–0.6)
MONOCYTES # BLD AUTO: 1.2 K/UL — HIGH (ref 0.1–0.6)
MONOCYTES NFR BLD AUTO: 13.5 % — HIGH (ref 1.7–9.3)
MONOCYTES NFR BLD AUTO: 8.6 % — SIGNIFICANT CHANGE UP (ref 1.7–9.3)
NEUTROPHILS # BLD AUTO: 6.56 K/UL — HIGH (ref 1.4–6.5)
NEUTROPHILS # BLD AUTO: 9.34 K/UL — HIGH (ref 1.4–6.5)
NEUTROPHILS NFR BLD AUTO: 74.1 % — SIGNIFICANT CHANGE UP (ref 42.2–75.2)
NEUTROPHILS NFR BLD AUTO: 81 % — HIGH (ref 42.2–75.2)
NRBC # BLD: 0 /100 WBCS — SIGNIFICANT CHANGE UP (ref 0–0)
NRBC # BLD: 1 /100 WBCS — HIGH (ref 0–0)
PHOSPHATE SERPL-MCNC: 3 MG/DL — SIGNIFICANT CHANGE UP (ref 2.1–4.9)
PHOSPHATE SERPL-MCNC: 3.8 MG/DL — SIGNIFICANT CHANGE UP (ref 2.1–4.9)
PLATELET # BLD AUTO: 427 K/UL — HIGH (ref 130–400)
PLATELET # BLD AUTO: 451 K/UL — HIGH (ref 130–400)
POTASSIUM SERPL-MCNC: 3.2 MMOL/L — LOW (ref 3.5–5)
POTASSIUM SERPL-MCNC: 3.3 MMOL/L — LOW (ref 3.5–5)
POTASSIUM SERPL-SCNC: 3.2 MMOL/L — LOW (ref 3.5–5)
POTASSIUM SERPL-SCNC: 3.3 MMOL/L — LOW (ref 3.5–5)
PROT SERPL-MCNC: 5.7 G/DL — LOW (ref 6–8)
PROTHROM AB SERPL-ACNC: 13.7 SEC — HIGH (ref 9.95–12.87)
RBC # BLD: 3.71 M/UL — LOW (ref 4.2–5.4)
RBC # BLD: 4.02 M/UL — LOW (ref 4.2–5.4)
RBC # FLD: 15.9 % — HIGH (ref 11.5–14.5)
RBC # FLD: 16 % — HIGH (ref 11.5–14.5)
SODIUM SERPL-SCNC: 139 MMOL/L — SIGNIFICANT CHANGE UP (ref 135–146)
SODIUM SERPL-SCNC: 148 MMOL/L — HIGH (ref 135–146)
SURGICAL PATHOLOGY STUDY: SIGNIFICANT CHANGE UP
TROPONIN T SERPL-MCNC: <0.01 NG/ML — SIGNIFICANT CHANGE UP
WBC # BLD: 11.52 K/UL — HIGH (ref 4.8–10.8)
WBC # BLD: 8.86 K/UL — SIGNIFICANT CHANGE UP (ref 4.8–10.8)
WBC # FLD AUTO: 11.52 K/UL — HIGH (ref 4.8–10.8)
WBC # FLD AUTO: 8.86 K/UL — SIGNIFICANT CHANGE UP (ref 4.8–10.8)

## 2019-09-23 PROCEDURE — 71045 X-RAY EXAM CHEST 1 VIEW: CPT | Mod: 26

## 2019-09-23 PROCEDURE — 93010 ELECTROCARDIOGRAM REPORT: CPT

## 2019-09-23 RX ORDER — SODIUM CHLORIDE 9 MG/ML
500 INJECTION, SOLUTION INTRAVENOUS ONCE
Refills: 0 | Status: COMPLETED | OUTPATIENT
Start: 2019-09-23 | End: 2019-09-23

## 2019-09-23 RX ORDER — SODIUM CHLORIDE 9 MG/ML
1000 INJECTION INTRAMUSCULAR; INTRAVENOUS; SUBCUTANEOUS ONCE
Refills: 0 | Status: DISCONTINUED | OUTPATIENT
Start: 2019-09-23 | End: 2019-09-23

## 2019-09-23 RX ORDER — METRONIDAZOLE 500 MG
TABLET ORAL
Refills: 0 | Status: DISCONTINUED | OUTPATIENT
Start: 2019-09-23 | End: 2019-09-23

## 2019-09-23 RX ORDER — SODIUM CHLORIDE 9 MG/ML
1000 INJECTION INTRAMUSCULAR; INTRAVENOUS; SUBCUTANEOUS
Refills: 0 | Status: DISCONTINUED | OUTPATIENT
Start: 2019-09-23 | End: 2019-09-24

## 2019-09-23 RX ORDER — AZTREONAM 2 G
1000 VIAL (EA) INJECTION ONCE
Refills: 0 | Status: COMPLETED | OUTPATIENT
Start: 2019-09-23 | End: 2019-09-23

## 2019-09-23 RX ORDER — AZTREONAM 2 G
VIAL (EA) INJECTION
Refills: 0 | Status: DISCONTINUED | OUTPATIENT
Start: 2019-09-23 | End: 2019-09-24

## 2019-09-23 RX ORDER — METRONIDAZOLE 500 MG
500 TABLET ORAL ONCE
Refills: 0 | Status: DISCONTINUED | OUTPATIENT
Start: 2019-09-23 | End: 2019-09-23

## 2019-09-23 RX ORDER — POTASSIUM CHLORIDE 20 MEQ
10 PACKET (EA) ORAL ONCE
Refills: 0 | Status: DISCONTINUED | OUTPATIENT
Start: 2019-09-23 | End: 2019-09-23

## 2019-09-23 RX ORDER — POTASSIUM CHLORIDE 20 MEQ
20 PACKET (EA) ORAL ONCE
Refills: 0 | Status: COMPLETED | OUTPATIENT
Start: 2019-09-23 | End: 2019-09-23

## 2019-09-23 RX ORDER — AZTREONAM 2 G
1000 VIAL (EA) INJECTION EVERY 8 HOURS
Refills: 0 | Status: DISCONTINUED | OUTPATIENT
Start: 2019-09-23 | End: 2019-09-24

## 2019-09-23 RX ORDER — HEPARIN SODIUM 5000 [USP'U]/ML
5000 INJECTION INTRAVENOUS; SUBCUTANEOUS EVERY 12 HOURS
Refills: 0 | Status: DISCONTINUED | OUTPATIENT
Start: 2019-09-24 | End: 2019-09-24

## 2019-09-23 RX ORDER — METRONIDAZOLE 500 MG
500 TABLET ORAL EVERY 8 HOURS
Refills: 0 | Status: DISCONTINUED | OUTPATIENT
Start: 2019-09-23 | End: 2019-09-23

## 2019-09-23 RX ADMIN — Medication 15 MILLIGRAM(S): at 17:16

## 2019-09-23 RX ADMIN — Medication 15 MILLIGRAM(S): at 17:20

## 2019-09-23 RX ADMIN — Medication 50 MILLIEQUIVALENT(S): at 20:10

## 2019-09-23 RX ADMIN — Medication 1 SPRAY(S): at 23:06

## 2019-09-23 RX ADMIN — Medication 50 MILLIGRAM(S): at 23:07

## 2019-09-23 RX ADMIN — Medication 15 MILLIGRAM(S): at 23:06

## 2019-09-23 RX ADMIN — Medication 100 MILLIGRAM(S): at 23:05

## 2019-09-23 RX ADMIN — SODIUM CHLORIDE 150 MILLILITER(S): 9 INJECTION INTRAMUSCULAR; INTRAVENOUS; SUBCUTANEOUS at 20:13

## 2019-09-23 RX ADMIN — PANTOPRAZOLE SODIUM 40 MILLIGRAM(S): 20 TABLET, DELAYED RELEASE ORAL at 11:27

## 2019-09-23 RX ADMIN — Medication 50 MILLIEQUIVALENT(S): at 17:15

## 2019-09-23 RX ADMIN — Medication 400 MILLIGRAM(S): at 00:00

## 2019-09-23 RX ADMIN — Medication 1 SPRAY(S): at 01:51

## 2019-09-23 RX ADMIN — Medication 15 MILLIGRAM(S): at 11:29

## 2019-09-23 RX ADMIN — Medication 15 MILLIGRAM(S): at 00:00

## 2019-09-23 RX ADMIN — Medication 15 MILLIGRAM(S): at 11:26

## 2019-09-23 RX ADMIN — SODIUM CHLORIDE 500 MILLILITER(S): 9 INJECTION, SOLUTION INTRAVENOUS at 19:43

## 2019-09-23 RX ADMIN — Medication 50 MILLIEQUIVALENT(S): at 04:20

## 2019-09-23 RX ADMIN — Medication 50 MILLIGRAM(S): at 13:58

## 2019-09-23 RX ADMIN — Medication 15 MILLIGRAM(S): at 05:15

## 2019-09-23 RX ADMIN — Medication 100 MILLIGRAM(S): at 13:27

## 2019-09-23 NOTE — PROGRESS NOTE ADULT - SUBJECTIVE AND OBJECTIVE BOX
PGY -2 Note:    Patient seen and examined. Pain well controlled at this time. Reports feeling thirsty, and is rinsing mouth out with water. Denies fever, chills, nausea, vomiting, chest pain, shortness of breath, severe abdominal pain, heavy vaginal bleeding. Is ambulating, reports + Flatus, + bowel movement.     Physical Exam:  Vital Signs:  T(C): 35.9 (09-23-19 @ 15:15), Max: 38.6 (09-23-19 @ 00:00)  HR: 113 (09-23-19 @ 15:15) (104 - 122)  BP: 115/67 (09-23-19 @ 15:15) (110/66 - 135/90)  RR: 18 (09-23-19 @ 15:15) (14 - 95)  SpO2: 95% (09-23-19 @ 05:14) (95% - 95%)    NGT Output: 1100cc from [id number].   Stool count: X2    Labs:                        9.2    11.52 )-----------( 427      ( 23 Sep 2019 14:30 )             29.4   09-23    148<H>  |  97<L>  |  36<H>  ----------------------------<  118<H>  3.2<L>   |  37<H>  |  0.9    Ca    7.7<L>      23 Sep 2019 14:30  Phos  3.0     09-23  Mg     2.7     09-23    TPro  5.7<L>  /  Alb  2.8<L>  /  TBili  0.6  /  DBili  x   /  AST  19  /  ALT  15  /  AlkPhos  80  09-23    ABG - ( 23 Sep 2019 02:00 )  pH, Arterial: 7.55  pH, Blood: x     /  pCO2: 44    /  pO2: 89    / HCO3: 38    / Base Excess: 14.0  /  SaO2: 97          Magnesium, Serum (09.23.19 @ 14:30)    Magnesium, Serum: 2.7 mg/dL    Potassium, Serum: 3.3 mmol/L (09.23.19 @ 00:55)        Medications:  aztreonam  IVPB      aztreonam  IVPB 1000 milliGRAM(s) IV Intermittent every 8 hours  benzocaine 20% Spray 1 Spray(s) Topical every 4 hours  clindamycin IVPB 900 milliGRAM(s) IV Intermittent every 8 hours  clindamycin IVPB      ketorolac   Injectable 15 milliGRAM(s) IV Push every 6 hours  lactated ringers. 1000 milliLiter(s) IV Continuous <Continuous>  ondansetron Injectable 8 milliGRAM(s) IV Push every 8 hours PRN  pantoprazole  Injectable 40 milliGRAM(s) IV Push daily  potassium chloride  20 mEq/100 mL IVPB 20 milliEquivalent(s) IV Intermittent once  potassium chloride  20 mEq/100 mL IVPB 20 milliEquivalent(s) IV Intermittent once  sodium chloride 0.9%. 1000 milliLiter(s) IV Continuous <Continuous>

## 2019-09-23 NOTE — DIETITIAN INITIAL EVALUATION ADULT. - NAME AND PHONE
RD to monitor diet order, energy intake, electrolyte/glucose profiles, nutrition focused physical findings

## 2019-09-23 NOTE — DIETITIAN INITIAL EVALUATION ADULT. - ADD RECOMMEND
1. Monitor Surgery/GYN POC and advance diet per their recs when medically feasible + vegetarian (fish allowed) diet modifier; will assess need for oral supplements upon diet advancement

## 2019-09-23 NOTE — PROGRESS NOTE ADULT - ASSESSMENT
48yo P1 h/o UC with total colectomy, h/o DCIS s/p lumpectomy with radiation, h/o unprovoked PE found to have prothrombin mutation (heterozygous), with fibroid uterus and pelvic pain s/p RATLH, BSO, cystoscopy, EBL 250cc, complicated by postoperative ileus, intermittent fevers,  POD #6  GI: NG to LCS, draining green fluid, monitor output, continue NPO  DVT prophylaxis: lovenox qd, encourage ambulation  Neuro: pain control  Cardio: no dx  Pulm: incentive spirometer and ambulation  ID: intermittent temps without obvious source of infection, no PE on CT angio, neg CXR, LE duplex read pending, no physical exam findings, f/u pending BCx  Endo: no dx  : urinating, no vaginal bleeding or foul smelling discharge  Heme: H/H stable    Plan: monitor NG output, keep NPO, monitor temps, send UCx    Will inform Dr Wayne

## 2019-09-23 NOTE — DIETITIAN INITIAL EVALUATION ADULT. - PHYSICAL APPEARANCE
alert and oriented. BMI: 25.0, IBW: 125#, UBW: ~150, denies any recent wt loss. no edema noted, surgical incision/well nourished

## 2019-09-23 NOTE — PROGRESS NOTE ADULT - ASSESSMENT
50yo P1 h/o UC with total colectomy, h/o DCIS s/p lumpectomy with radiation, h/o unprovoked PE found to have prothrombin mutation (heterozygous), with fibroid uterus and pelvic pain s/p RATLH, BSO, cystoscopy, EBL 250cc, complicated by postoperative ileus, s/p NGT on 9/22, with intermittent fevers, currently afebrile.   POD #6  GI: NGT, on wall suction, monitor output, continue NPO  DVT prophylaxis: lovenox transitioned to Heparin BID  Nephro: hypokalemia, potassium replaced   Pulm: incentive spirometer; Metabolic alkalosis with respiratory compensation likely due to NG tube suctioning per Pulmonology consult   ID: intermittent temps without obvious source of infection, no PE on CT angio, neg CXR, LE duplex read pending, no physical exam findings, f/u pending BCx  -2300 labs ordered  -On Aztreonam and Clindamycin   : urinating, no vaginal bleeding or foul smelling discharge  Heme: H/H stable    Plan: monitor NG output, keep NPO, monitor VS    Dr Jaime and Dr. Paris aware

## 2019-09-23 NOTE — CONSULT NOTE ADULT - SUBJECTIVE AND OBJECTIVE BOX
Patient is a 49y old  Female who presents with a chief complaint of scheduled surgery (17 Sep 2019 21:14)      HPI:      Review of System:  See HPI    Allergies    codeine (Vomiting; Nausea)  latex (Anaphylaxis)  penicillin (Other)    Intolerances        Home Medications:      SOCIAL HX:    Smoking            ETOH/illicit drugs             Occupation    PAST MEDICAL & SURGICAL HISTORY:  OA (osteoarthritis)  Anxiety  Breast cancer: left breast cancer,, lumpectomy&amp;  radiation (~2017)  S/P colectomy: hx ulcerative colitis  Liver disease: &quot;twisted liver&quot;, hemangioma  MVP (mitral valve prolapse)  Bell palsy  Pulmonary embolism: &gt; 5 yrs ago, no current meds, pt is unsure of cause  Breast cancer: left side, lumpectomy  H/O colectomy: colostom&amp; reversal pt sttaes pouch is on the left side  S/P lumpectomy, left breast      FAMILY HISTORY:    No cardiovascular or pulmonary family history         PHYSICAL EXAM  Vital Signs Last 24 Hrs  T(C): 36.6 (23 Sep 2019 07:19), Max: 38.6 (23 Sep 2019 00:00)  T(F): 97.9 (23 Sep 2019 07:19), Max: 101.4 (23 Sep 2019 00:00)  HR: 104 (23 Sep 2019 07:19) (103 - 122)  BP: 110/66 (23 Sep 2019 07:19) (110/66 - 139/90)  BP(mean): --  RR: 18 (23 Sep 2019 07:19) (14 - 95)  SpO2: 95% (23 Sep 2019 05:14) (95% - 95%)    General: In NAD  HEENT: MARTIN             Lymphatic system: No LN  appreciated  Lungs: Fran BS  Cardiovascular: Regular  Gastrointestinal: Soft.  + BS   Musculoskeletal: No Clubbing.  Moves all extremities  Skin: Warm.  Intact  Neurological: No motor or sensory deficit       LABS:                          10.0   8.86  )-----------( 451      ( 23 Sep 2019 00:55 )             31.7                                               09-23    139  |  93<L>  |  39<H>  ----------------------------<  132<H>  3.3<L>   |  30  |  1.1    Ca    8.0<L>      23 Sep 2019 00:55  Phos  3.8     09-23  Mg     2.4     09-23    TPro  5.7<L>  /  Alb  2.8<L>  /  TBili  0.6  /  DBili  x   /  AST  19  /  ALT  15  /  AlkPhos  80  09-23      PT/INR - ( 23 Sep 2019 00:55 )   PT: 13.70 sec;   INR: 1.19 ratio         PTT - ( 23 Sep 2019 00:55 )  PTT:35.7 sec                                           CARDIAC MARKERS ( 23 Sep 2019 00:55 )  x     / <0.01 ng/mL / 107 U/L / x     / 1.3 ng/mL                                            LIVER FUNCTIONS - ( 23 Sep 2019 00:55 )  Alb: 2.8 g/dL / Pro: 5.7 g/dL / ALK PHOS: 80 U/L / ALT: 15 U/L / AST: 19 U/L / GGT: x                                                                                            ABG - ( 23 Sep 2019 02:00 )  pH, Arterial: 7.55  pH, Blood: x     /  pCO2: 44    /  pO2: 89    / HCO3: 38    / Base Excess: 14.0  /  SaO2: 97                        ECHO  CXR reviewed    MEDICATIONS  (STANDING):  benzocaine 20% Spray 1 Spray(s) Topical every 4 hours  enoxaparin Injectable 40 milliGRAM(s) SubCutaneous at bedtime  ketorolac   Injectable 15 milliGRAM(s) IV Push every 6 hours  lactated ringers. 1000 milliLiter(s) (125 mL/Hr) IV Continuous <Continuous>  pantoprazole  Injectable 40 milliGRAM(s) IV Push daily  sodium chloride 0.9%. 1000 milliLiter(s) (125 mL/Hr) IV Continuous <Continuous>    MEDICATIONS  (PRN):  ondansetron Injectable 8 milliGRAM(s) IV Push every 8 hours PRN Nausea and/or Vomiting Patient is a 49y old  Female who presents with a chief complaint of scheduled surgery (17 Sep 2019 21:14)      HPI:    50 yo F with hx of PE 8 yrs ago not currently on A/C, ?prothrombin gene mutation, never smoker, UC s/p colectomy, Breast ca in remission s/p lumpectomy and radiation, admitted for abdominal pain secondary to uterine fibroids s/p MAYI and SO postop day 6.    Pulm called for abnormal ABG.    Patient denies any pulmonary complaints - no dyspnea chest pain, leg swelling.    Review of System:  See HPI    Allergies    codeine (Vomiting; Nausea)  latex (Anaphylaxis)  penicillin (Other)    Intolerances    Home Medications:      SOCIAL HX:    Smoking never           ETOH/illicit drugs  denies          Occupation office based    PAST MEDICAL & SURGICAL HISTORY:  OA (osteoarthritis)  Anxiety  Breast cancer: left breast cancer,, lumpectomy&amp;  radiation (~2017)  S/P colectomy: hx ulcerative colitis  Liver disease: &quot;twisted liver&quot;, hemangioma  MVP (mitral valve prolapse)  Bell palsy  Pulmonary embolism: &gt; 5 yrs ago, no current meds, pt is unsure of cause  Breast cancer: left side, lumpectomy  H/O colectomy: colostom&amp; reversal pt sttaes pouch is on the left side  S/P lumpectomy, left breast      FAMILY HISTORY:    No cardiovascular or pulmonary family history         PHYSICAL EXAM  Vital Signs Last 24 Hrs  T(C): 36.6 (23 Sep 2019 07:19), Max: 38.6 (23 Sep 2019 00:00)  T(F): 97.9 (23 Sep 2019 07:19), Max: 101.4 (23 Sep 2019 00:00)  HR: 104 (23 Sep 2019 07:19) (103 - 122)  BP: 110/66 (23 Sep 2019 07:19) (110/66 - 139/90)  BP(mean): --  RR: 18 (23 Sep 2019 07:19) (14 - 95)  SpO2: 95% (23 Sep 2019 05:14) (95% - 95%)    General: In NAD  HEENT: MARTIN   +NG tube to suction          Lymphatic system: No LN  appreciated  Lungs: Fran BS reduced at bases  Cardiovascular: Regular  Gastrointestinal: Soft.  + BS   Musculoskeletal: No Clubbing.  Moves all extremities no edema  Skin: Warm.  Intact  Neurological: No motor or sensory deficit       LABS:                          10.0   8.86  )-----------( 451      ( 23 Sep 2019 00:55 )             31.7                                               09-23    139  |  93<L>  |  39<H>  ----------------------------<  132<H>  3.3<L>   |  30  |  1.1    Ca    8.0<L>      23 Sep 2019 00:55  Phos  3.8     09-23  Mg     2.4     09-23    TPro  5.7<L>  /  Alb  2.8<L>  /  TBili  0.6  /  DBili  x   /  AST  19  /  ALT  15  /  AlkPhos  80  09-23      PT/INR - ( 23 Sep 2019 00:55 )   PT: 13.70 sec;   INR: 1.19 ratio         PTT - ( 23 Sep 2019 00:55 )  PTT:35.7 sec                                           CARDIAC MARKERS ( 23 Sep 2019 00:55 )  x     / <0.01 ng/mL / 107 U/L / x     / 1.3 ng/mL                                            LIVER FUNCTIONS - ( 23 Sep 2019 00:55 )  Alb: 2.8 g/dL / Pro: 5.7 g/dL / ALK PHOS: 80 U/L / ALT: 15 U/L / AST: 19 U/L / GGT: x                                                                                            ABG - ( 23 Sep 2019 02:00 )  pH, Arterial: 7.55  pH, Blood: x     /  pCO2: 44    /  pO2: 89    / HCO3: 38    / Base Excess: 14.0  /  SaO2: 97            < from: [facility] Duplex Lower Ext Vein Scan, Bilat (09.22.19 @ 09:51) >      Impression:    No evidence of deep venous thrombosis in the bilateral lower extremities.    ICD-10: M79.89      < end of copied text >  ct< from: CT Angio Chest w/ IV Cont (09.22.19 @ 16:05) >  IMPRESSION:    No evidence for acute pulmonary embolus.    Marked distention of the stomach with air and fluid.    Anterior chest wall and bilateral flank subcutaneous air partially imaged   and overall stable since prior exam.      < end of copied text >              ECHO  CXR reviewed    MEDICATIONS  (STANDING):  benzocaine 20% Spray 1 Spray(s) Topical every 4 hours  enoxaparin Injectable 40 milliGRAM(s) SubCutaneous at bedtime  ketorolac   Injectable 15 milliGRAM(s) IV Push every 6 hours  lactated ringers. 1000 milliLiter(s) (125 mL/Hr) IV Continuous <Continuous>  pantoprazole  Injectable 40 milliGRAM(s) IV Push daily  sodium chloride 0.9%. 1000 milliLiter(s) (125 mL/Hr) IV Continuous <Continuous>    MEDICATIONS  (PRN):  ondansetron Injectable 8 milliGRAM(s) IV Push every 8 hours PRN Nausea and/or Vomiting Patient is a 49y old  Female who presents with a chief complaint of scheduled surgery (17 Sep 2019 21:14)      HPI:    50 yo F with hx of PE 8 yrs ago not currently on A/C, ?prothrombin gene mutation, never smoker, UC s/p colectomy, Breast ca in remission s/p lumpectomy and radiation, admitted for abdominal pain secondary to uterine fibroids s/p MAYI and SO postop day 6.    Pulm called for abnormal ABG.    Patient denies any pulmonary complaints - no dyspnea chest pain, leg swelling. s/p NGT feels much better.    Review of System:  See HPI    Allergies    codeine (Vomiting; Nausea)  latex (Anaphylaxis)  penicillin (Other)    Intolerances    Home Medications:      SOCIAL HX:    Smoking never           ETOH/illicit drugs  denies          Occupation office based    PAST MEDICAL & SURGICAL HISTORY:  OA (osteoarthritis)  Anxiety  Breast cancer: left breast cancer,, lumpectomy&amp;  radiation (~2017)  S/P colectomy: hx ulcerative colitis  Liver disease: &quot;twisted liver&quot;, hemangioma  MVP (mitral valve prolapse)  Bell palsy  Pulmonary embolism: &gt; 5 yrs ago, no current meds, pt is unsure of cause  Breast cancer: left side, lumpectomy  H/O colectomy: colostom&amp; reversal pt sttaes pouch is on the left side  S/P lumpectomy, left breast      FAMILY HISTORY:    No cardiovascular or pulmonary family history         PHYSICAL EXAM  Vital Signs Last 24 Hrs  T(C): 36.6 (23 Sep 2019 07:19), Max: 38.6 (23 Sep 2019 00:00)  T(F): 97.9 (23 Sep 2019 07:19), Max: 101.4 (23 Sep 2019 00:00)  HR: 104 (23 Sep 2019 07:19) (103 - 122)  BP: 110/66 (23 Sep 2019 07:19) (110/66 - 139/90)  RR: 18 (23 Sep 2019 07:19) (14 - 95)  SpO2: 95% (23 Sep 2019 05:14) (95% - 95%)    General: In NAD  HEENT: MARTIN   +NG tube to suction          Lymphatic system: No LN  appreciated  Lungs: Fran BS reduced at bases  Cardiovascular: Regular  Gastrointestinal: Soft.  + BS   Musculoskeletal: No Clubbing.  Moves all extremities no edema  Skin: Warm.  Intact  Neurological: No motor or sensory deficit       LABS:                          10.0   8.86  )-----------( 451      ( 23 Sep 2019 00:55 )             31.7                                               09-23    139  |  93<L>  |  39<H>  ----------------------------<  132<H>  3.3<L>   |  30  |  1.1    Ca    8.0<L>      23 Sep 2019 00:55  Phos  3.8     09-23  Mg     2.4     09-23    TPro  5.7<L>  /  Alb  2.8<L>  /  TBili  0.6  /  DBili  x   /  AST  19  /  ALT  15  /  AlkPhos  80  09-23      PT/INR - ( 23 Sep 2019 00:55 )   PT: 13.70 sec;   INR: 1.19 ratio         PTT - ( 23 Sep 2019 00:55 )  PTT:35.7 sec                                           CARDIAC MARKERS ( 23 Sep 2019 00:55 )  x     / <0.01 ng/mL / 107 U/L / x     / 1.3 ng/mL                                            LIVER FUNCTIONS - ( 23 Sep 2019 00:55 )  Alb: 2.8 g/dL / Pro: 5.7 g/dL / ALK PHOS: 80 U/L / ALT: 15 U/L / AST: 19 U/L / GGT: x                                                                                            ABG - ( 23 Sep 2019 02:00 )  pH, Arterial: 7.55  pH, Blood: x     /  pCO2: 44    /  pO2: 89    / HCO3: 38    / Base Excess: 14.0  /  SaO2: 97            < from: [facility] Duplex Lower Ext Vein Scan, Bilat (09.22.19 @ 09:51) >      Impression:    No evidence of deep venous thrombosis in the bilateral lower extremities.    ICD-10: M79.89      < end of copied text >  ct< from: CT Angio Chest w/ IV Cont (09.22.19 @ 16:05) >  IMPRESSION:    No evidence for acute pulmonary embolus.    Marked distention of the stomach with air and fluid.    Anterior chest wall and bilateral flank subcutaneous air partially imaged   and overall stable since prior exam.      < end of copied text >              ECHO  CXR reviewed    MEDICATIONS  (STANDING):  benzocaine 20% Spray 1 Spray(s) Topical every 4 hours  enoxaparin Injectable 40 milliGRAM(s) SubCutaneous at bedtime  ketorolac   Injectable 15 milliGRAM(s) IV Push every 6 hours  lactated ringers. 1000 milliLiter(s) (125 mL/Hr) IV Continuous <Continuous>  pantoprazole  Injectable 40 milliGRAM(s) IV Push daily  sodium chloride 0.9%. 1000 milliLiter(s) (125 mL/Hr) IV Continuous <Continuous>    MEDICATIONS  (PRN):  ondansetron Injectable 8 milliGRAM(s) IV Push every 8 hours PRN Nausea and/or Vomiting

## 2019-09-23 NOTE — PROGRESS NOTE ADULT - SUBJECTIVE AND OBJECTIVE BOX
PGY 4 Note:    Came to assess patient for Temp 101.4F. Patient denies feeling feverish, chills, CP, SOB, nausea, abdominal pain. Reports decreased abdominal distension. She had a NG tube placed earlier in the evening which so far has drained 1700cc with episode of vomiting during NG tube placement 1800cc, thin dark green fluid. Patient was sitting up at a 90 degree angle during episode of vomiting. Currently is feeling okay.     Vital Signs Last 24 Hrs  T(C): 38.6 (23 Sep 2019 00:00), Max: 38.6 (23 Sep 2019 00:00)  T(F): 101.4 (23 Sep 2019 00:00), Max: 101.4 (23 Sep 2019 00:00)  HR: 122 (23 Sep 2019 00:00) (103 - 122)  BP: 130/88 (23 Sep 2019 00:00) (130/88 - 139/90)  RR: 14 (23 Sep 2019 00:00) (14 - 95)  SpO2: 95% (22 Sep 2019 13:48) (95% - 96%) on 3L NC    Gen: NAD, A&OX3  Heart: S1S2, RRR  Lungs: Decreased breath sounds on left side throughout, no crackles/wheezing/rhochi/rales  Abd: Mildly distended, incision with steri strips in place, soft, NT, hypoactive BS throughout  VE: Deferred    Labs:                          9.6    4.38  )-----------( 358      ( 21 Sep 2019 06:25 )             31.2     Blood Gas Arterial, Lactate: 1.2 mmoL/L (09.22.19 @ 13:31)    09-21    143  |  99  |  23<H>  ----------------------------<  147<H>  4.1   |  31  |  0.8    Ca    9.2      21 Sep 2019 06:25  Phos  4.4     09-21  Mg     1.9     09-21    TPro  5.9<L>  /  Alb  3.1<L>  /  TBili  0.6  /  DBili  x   /  AST  15  /  ALT  13  /  AlkPhos  69  09-21    Blood Gas Profile - Arterial (09.22.19 @ 13:31)    pH, Arterial: 7.55    pCO2, Arterial: 40 mmHg    pO2, Arterial: 70 mmHg    HCO3, Arterial: 35 mmoL/L    Base Excess, Arterial: 11.5 mmoL/L    Oxygen Saturation, Arterial: 95 %    FIO2, Arterial: 21    MEDICATIONS  (STANDING):  benzocaine 20% Spray 1 Spray(s) Topical every 4 hours  enoxaparin Injectable 40 milliGRAM(s) SubCutaneous at bedtime  ketorolac   Injectable 15 milliGRAM(s) IV Push every 6 hours  lactated ringers. 1000 milliLiter(s) (125 mL/Hr) IV Continuous <Continuous>  pantoprazole  Injectable 40 milliGRAM(s) IV Push daily  sodium chloride 0.9%. 1000 milliLiter(s) (125 mL/Hr) IV Continuous <Continuous>    MEDICATIONS  (PRN):  ondansetron Injectable 8 milliGRAM(s) IV Push every 8 hours PRN Nausea and/or Vomiting    Imaging:    < from: CT Angio Chest w/ IV Cont (09.22.19 @ 16:05) >  EXAM:  CT ANGIO CHEST (W)AW IC            PROCEDURE DATE:  09/22/2019            INTERPRETATION:  CLINICAL STATEMENT: Postop. Shortness of breath,   tachycardia. Clinical diagnosis of pulmonary embolus.    TECHNIQUE: Contiguous axial CTA images were obtained from the thoracic   inlet to the upper abdomen following administration of Optiray 320   intravenous contrast. Reformatted axial 3-D MIP, coronal, and sagittal   images were acquired.    COMPARISON: None.      FINDINGS:    PULMONARY EMBOLUS: No evidence for acute pulmonary embolus.    LUNGS/PLEURA/AIRWAYS: Central airways are patent. No focal parenchymal   consolidation, pleural effusion, or pneumothorax. Bilateral lower lobe   atelectasis.    MEDIASTINUM/THORACIC NODES: No enlarged mediastinal lymph nodes.   Visualized thyroid gland is unremarkable.    HEART/GREAT VESSELS: Normal heart size. No pericardial effusion.    UPPER ABDOMEN: Stomach is markedly distended with fluid and air.   Partially imaged abdominal ascites. Previously noted large hepatic   hypodensity is better evaluated on recent abdominal CT. Moderate hiatal   hernia.    BONES AND SOFT TISSUES: Bilateral breast prostheses are noted. Anterior   chest wall and bilateral flank subcutaneous air partially imaged and   overall stable since prior exam.      IMPRESSION:    No evidence for acute pulmonary embolus.    Marked distention of the stomach with air and fluid.    Anterior chest wall and bilateral flank subcutaneous air partially imaged   and overall stable since prior exam.    LE DUPLEX (prelim) Negative      EKG: Diagnosis Line Sinus tachycardia  Possible Left atrial enlargement  Poor R wave progression  Inferior infarct , age undetermined  Abnormal ECG

## 2019-09-23 NOTE — PROGRESS NOTE ADULT - ASSESSMENT
50yo P1 h/o UC with total colectomy, h/o DCIS s/p lumpectomy with radiation, h/o unprovocked PE found to have prothrombin mutation (heterozygous), with fibroid uterus and pelvic pain s/p RATLH, BSO, cystoscopy, EBL 250cc, POD #6, complicated by postoperative ileus, NG tube in place, febrile episode, decreased breath sounds on left side    - Stat labs  - Blood cultures  - F/u CXR read for NG tube placement  - Repeat CXR  - EKG  - ABG  - NPO   - NG tube to LCS, f/u output  - VS Q4hrs  - Lovenox QD, SCDs  - Protonix QD  - Ambulate as tolerated  - Pain mgt  - F/u Gen Surg    Dr. Wayne to be made aware

## 2019-09-23 NOTE — DIETITIAN INITIAL EVALUATION ADULT. - OTHER INFO
Nutrition Hx PTA: Pt with excellent appetite/po intake, generally consumes 3 meals + snacks daily. Denies use of oral nutrition supplements and is a pescatarian. Pt follows a diet rich in fresh fruits, vegetables, nuts, and dairy products. Pt states she is lactose-intolerant, but can tolerate yogurt, cheese, and ice cream. Pt drinks almond milk, however.     Pt p/w prothrombin mutation (heterozygous), with fibroid uterus and pelvic pain s/p RATLH, BSO, cystoscopy, EBL 250cc, complicated by postoperative ileus, intermittent fevers; POD #6. NG to LCS, draining green fluid, monitor output, continue NPO. ID: intermittent temps without obvious source of infection, no PE on CT angio, neg CXR, LE duplex read pending, no physical exam findings, f/u pending BCx.

## 2019-09-23 NOTE — DIETITIAN INITIAL EVALUATION ADULT. - FEEDING SKILL
independent/Pt currently NPO, previously was on clears, which she was tolerating well and prior to that was on Regular, which she was consuming about half of her meal trays.

## 2019-09-23 NOTE — DIETITIAN INITIAL EVALUATION ADULT. - ENERGY NEEDS
Calories: 7550-8620 kcal/day (MSJ x 1.2-1.3 AF)  Protein: 68-82 gm/day (1-1.2 gm/kg CBW)  Fluid: 1 ml/kcal

## 2019-09-23 NOTE — CONSULT NOTE ADULT - ASSESSMENT
IMPRESSION:    Symptomatic uterine fibroids s/p MAYI and BSO POD#6 complicated by ileus  Bibasilar atelectasis  Metabolic alkalosis with respiratory compensation likely due to NG tube suctioning  Subcutaneous emphysema ?post surgical  H/O PE not on A/C    PLAN:    ·	Wean off oxygen   ·	Incentive spirometry  ·	IVFs   ·	DVT ppx	  ·	Recall PRN IMPRESSION:    Symptomatic uterine fibroids s/p MAYI and BSO POD#6 complicated by ileus  Bibasilar atelectasis  Metabolic alkalosis with respiratory compensation likely due to NG tube suctioning  Subcutaneous emphysema ?post surgical  H/O PE not on A/C    PLAN:    ·	Wean off oxygen   ·	Incentive spirometry  ·	OOB to chair  ·	f/up cmp  ·	IVFs ++  ·	DVT ppx	  ·	Recall PRN/ op f/up

## 2019-09-23 NOTE — PROGRESS NOTE ADULT - ATTENDING COMMENTS
POD #6     # Ileus   - NG tube in place, NPO, IVFs. Patient is passing flatus and had bowel movements. F/u output and will possibly remove NG tube tonight and slowly advance diet  - Trend Creatinine. Increased fluids to 150cc/h.   - F/u repeat BMP, lactate - replace electrolytes as needed    # Fevers  - f/u temps, monitor for signs of infection  - f/u Bcx  - Will start IV antibiotics (cipro+Flagyl)   - ID consult

## 2019-09-23 NOTE — PROGRESS NOTE ADULT - SUBJECTIVE AND OBJECTIVE BOX
PGY 4 Note    Patient examined at bedside, awake this AM, comfortable.  Pt NPO with NG in place draining green fluid.  Had fever this .4, denies feeling febrile at that time.  Denies chest pain/SOB, incisional tenderness or draining, urinary symptoms, calf edema or tenderness, foul smelling vaginal discharge.  No longer passing flatus, denies any episodes of emesis since placement of NG.    T(F): 97.6 (09-23-19 @ 05:14), Max: 101.4 (09-23-19 @ 00:00)  HR: 104 (09-23-19 @ 05:14) (103 - 122)  BP: 135/90 (09-23-19 @ 05:14) (130/88 - 139/90)  RR: 17 (09-23-19 @ 05:14) (14 - 95)  SpO2: 95% (09-23-19 @ 05:14) (95% - 96%)    I&O's Summary    21 Sep 2019 07:01  -  22 Sep 2019 07:00  --------------------------------------------------------  IN: 1375 mL / OUT: 250 mL / NET: 1125 mL    22 Sep 2019 07:01  -  23 Sep 2019 06:18  --------------------------------------------------------  IN: 0 mL / OUT: 4500 mL / NET: -4500 mL      Physical Exam:  General: AAOx3. NAD  CVS: RRR. Nl S1S2  Lungs: CTAB  Abdomen: soft, non-tender, mildly distended, diminished BS  Incision: C/D/I, no erythema, no draining  VE: deferred, no bleeding on pad/chux  Ext: No edema. no calf tenderness.     Labs:             10.0<L>  8.86  )-----------( 451<H>    ( 09-23 @ 00:55 )             31.7<L>               9.6<L>  4.38<L> )-----------( 358      ( 09-21 @ 06:25 )             31.2<L>               9.8<L>  5.76  )-----------( 295      ( 09-20 @ 08:38 )             31.6<L>               10.6<L>  9.64  )-----------( 332      ( 09-19 @ 17:30 )             34.5<L>     09-23    139  |  93<L>  |  39<H>  ----------------------------<  132<H>  3.3<L>   |  30  |  1.1    Ca    8.0<L>      23 Sep 2019 00:55  Phos  3.8     09-23  Mg     2.4     09-23    TPro  5.7<L>  /  Alb  2.8<L>  /  TBili  0.6  /  DBili  x   /  AST  19  /  ALT  15  /  AlkPhos  80  09-23            Trend:             10.0<L>  8.86  )-----------( 451<H>    ( 09-23 @ 00:55 )             31.7<L>               9.6<L>  4.38<L> )-----------( 358      ( 09-21 @ 06:25 )             31.2<L>               9.8<L>  5.76  )-----------( 295      ( 09-20 @ 08:38 )             31.6<L>               10.6<L>  9.64  )-----------( 332      ( 09-19 @ 17:30 )             34.5<L>        Creatinine, Serum: 1.1 (09-23)  Creatinine, Serum: 0.8 (09-21)  Creatinine, Serum: 0.7 (09-20)  Creatinine, Serum: 0.7 (09-19)  Creatinine, Serum: 0.9 (09-17)      Medications:  MEDICATIONS  (STANDING):  benzocaine 20% Spray 1 Spray(s) Topical every 4 hours  enoxaparin Injectable 40 milliGRAM(s) SubCutaneous at bedtime  ketorolac   Injectable 15 milliGRAM(s) IV Push every 6 hours  lactated ringers. 1000 milliLiter(s) (125 mL/Hr) IV Continuous <Continuous>  pantoprazole  Injectable 40 milliGRAM(s) IV Push daily  sodium chloride 0.9%. 1000 milliLiter(s) (125 mL/Hr) IV Continuous <Continuous>    MEDICATIONS  (PRN):  ondansetron Injectable 8 milliGRAM(s) IV Push every 8 hours PRN Nausea and/or Vomiting PGY 4 Note    Patient examined at bedside, awake this AM, comfortable.  Pt NPO with NG in place draining green fluid.  Had fever this .4, denies feeling febrile at that time.  Denies chest pain/SOB, incisional tenderness or draining, urinary symptoms, calf edema or tenderness, foul smelling vaginal discharge.  Denies any episodes of emesis since placement of NG. Patient reports passing flatus and having bowel movements at night (soft/liquidy).     T(F): 97.6 (09-23-19 @ 05:14), Max: 101.4 (09-23-19 @ 00:00)  HR: 104 (09-23-19 @ 05:14) (103 - 122)  BP: 135/90 (09-23-19 @ 05:14) (130/88 - 139/90)  RR: 17 (09-23-19 @ 05:14) (14 - 95)  SpO2: 95% (09-23-19 @ 05:14) (95% - 96%)    I&O's Summary    21 Sep 2019 07:01  -  22 Sep 2019 07:00  --------------------------------------------------------  IN: 1375 mL / OUT: 250 mL / NET: 1125 mL    22 Sep 2019 07:01  -  23 Sep 2019 06:18  --------------------------------------------------------  IN: 0 mL / OUT: 4500 mL / NET: -4500 mL      Physical Exam:  General: AAOx3. NAD  CVS: RRR. Nl S1S2  Lungs: CTAB  Abdomen: soft, non-tender, mildly distended, diminished BS  Incision: C/D/I, no erythema, no draining  VE: deferred, no bleeding on pad/chux  Ext: No edema. no calf tenderness.     Labs:             10.0<L>  8.86  )-----------( 451<H>    ( 09-23 @ 00:55 )             31.7<L>               9.6<L>  4.38<L> )-----------( 358      ( 09-21 @ 06:25 )             31.2<L>               9.8<L>  5.76  )-----------( 295      ( 09-20 @ 08:38 )             31.6<L>               10.6<L>  9.64  )-----------( 332      ( 09-19 @ 17:30 )             34.5<L>     09-23    139  |  93<L>  |  39<H>  ----------------------------<  132<H>  3.3<L>   |  30  |  1.1    Ca    8.0<L>      23 Sep 2019 00:55  Phos  3.8     09-23  Mg     2.4     09-23    TPro  5.7<L>  /  Alb  2.8<L>  /  TBili  0.6  /  DBili  x   /  AST  19  /  ALT  15  /  AlkPhos  80  09-23            Trend:             10.0<L>  8.86  )-----------( 451<H>    ( 09-23 @ 00:55 )             31.7<L>               9.6<L>  4.38<L> )-----------( 358      ( 09-21 @ 06:25 )             31.2<L>               9.8<L>  5.76  )-----------( 295      ( 09-20 @ 08:38 )             31.6<L>               10.6<L>  9.64  )-----------( 332      ( 09-19 @ 17:30 )             34.5<L>        Creatinine, Serum: 1.1 (09-23)  Creatinine, Serum: 0.8 (09-21)  Creatinine, Serum: 0.7 (09-20)  Creatinine, Serum: 0.7 (09-19)  Creatinine, Serum: 0.9 (09-17)      Medications:  MEDICATIONS  (STANDING):  benzocaine 20% Spray 1 Spray(s) Topical every 4 hours  enoxaparin Injectable 40 milliGRAM(s) SubCutaneous at bedtime  ketorolac   Injectable 15 milliGRAM(s) IV Push every 6 hours  lactated ringers. 1000 milliLiter(s) (125 mL/Hr) IV Continuous <Continuous>  pantoprazole  Injectable 40 milliGRAM(s) IV Push daily  sodium chloride 0.9%. 1000 milliLiter(s) (125 mL/Hr) IV Continuous <Continuous>    MEDICATIONS  (PRN):  ondansetron Injectable 8 milliGRAM(s) IV Push every 8 hours PRN Nausea and/or Vomiting

## 2019-09-24 LAB
ANION GAP SERPL CALC-SCNC: 12 MMOL/L — SIGNIFICANT CHANGE UP (ref 7–14)
BASOPHILS # BLD AUTO: 0.02 K/UL — SIGNIFICANT CHANGE UP (ref 0–0.2)
BASOPHILS NFR BLD AUTO: 0.2 % — SIGNIFICANT CHANGE UP (ref 0–1)
BUN SERPL-MCNC: 29 MG/DL — HIGH (ref 10–20)
CALCIUM SERPL-MCNC: 7.1 MG/DL — LOW (ref 8.5–10.1)
CHLORIDE SERPL-SCNC: 102 MMOL/L — SIGNIFICANT CHANGE UP (ref 98–110)
CO2 SERPL-SCNC: 33 MMOL/L — HIGH (ref 17–32)
CREAT SERPL-MCNC: 0.8 MG/DL — SIGNIFICANT CHANGE UP (ref 0.7–1.5)
EOSINOPHIL # BLD AUTO: 0.06 K/UL — SIGNIFICANT CHANGE UP (ref 0–0.7)
EOSINOPHIL NFR BLD AUTO: 0.6 % — SIGNIFICANT CHANGE UP (ref 0–8)
GLUCOSE SERPL-MCNC: 117 MG/DL — HIGH (ref 70–99)
HCT VFR BLD CALC: 27.7 % — LOW (ref 37–47)
HGB BLD-MCNC: 8.5 G/DL — LOW (ref 12–16)
IMM GRANULOCYTES NFR BLD AUTO: 1.7 % — HIGH (ref 0.1–0.3)
LYMPHOCYTES # BLD AUTO: 0.84 K/UL — LOW (ref 1.2–3.4)
LYMPHOCYTES # BLD AUTO: 7.8 % — LOW (ref 20.5–51.1)
MAGNESIUM SERPL-MCNC: 2.8 MG/DL — HIGH (ref 1.8–2.4)
MCHC RBC-ENTMCNC: 24.9 PG — LOW (ref 27–31)
MCHC RBC-ENTMCNC: 30.7 G/DL — LOW (ref 32–37)
MCV RBC AUTO: 81.2 FL — SIGNIFICANT CHANGE UP (ref 81–99)
MONOCYTES # BLD AUTO: 1.05 K/UL — HIGH (ref 0.1–0.6)
MONOCYTES NFR BLD AUTO: 9.7 % — HIGH (ref 1.7–9.3)
NEUTROPHILS # BLD AUTO: 8.63 K/UL — HIGH (ref 1.4–6.5)
NEUTROPHILS NFR BLD AUTO: 80 % — HIGH (ref 42.2–75.2)
NRBC # BLD: 0 /100 WBCS — SIGNIFICANT CHANGE UP (ref 0–0)
PHOSPHATE SERPL-MCNC: 3 MG/DL — SIGNIFICANT CHANGE UP (ref 2.1–4.9)
PLATELET # BLD AUTO: 349 K/UL — SIGNIFICANT CHANGE UP (ref 130–400)
POTASSIUM SERPL-MCNC: 3 MMOL/L — LOW (ref 3.5–5)
POTASSIUM SERPL-SCNC: 3 MMOL/L — LOW (ref 3.5–5)
RBC # BLD: 3.41 M/UL — LOW (ref 4.2–5.4)
RBC # FLD: 16 % — HIGH (ref 11.5–14.5)
SODIUM SERPL-SCNC: 147 MMOL/L — HIGH (ref 135–146)
WBC # BLD: 10.78 K/UL — SIGNIFICANT CHANGE UP (ref 4.8–10.8)
WBC # FLD AUTO: 10.78 K/UL — SIGNIFICANT CHANGE UP (ref 4.8–10.8)

## 2019-09-24 PROCEDURE — 99024 POSTOP FOLLOW-UP VISIT: CPT

## 2019-09-24 PROCEDURE — 99232 SBSQ HOSP IP/OBS MODERATE 35: CPT

## 2019-09-24 PROCEDURE — 93010 ELECTROCARDIOGRAM REPORT: CPT

## 2019-09-24 PROCEDURE — 74022 RADEX COMPL AQT ABD SERIES: CPT | Mod: 26

## 2019-09-24 RX ORDER — METRONIDAZOLE 500 MG
500 TABLET ORAL ONCE
Refills: 0 | Status: COMPLETED | OUTPATIENT
Start: 2019-09-24 | End: 2019-09-24

## 2019-09-24 RX ORDER — ENOXAPARIN SODIUM 100 MG/ML
40 INJECTION SUBCUTANEOUS DAILY
Refills: 0 | Status: DISCONTINUED | OUTPATIENT
Start: 2019-09-25 | End: 2019-09-30

## 2019-09-24 RX ORDER — METRONIDAZOLE 500 MG
TABLET ORAL
Refills: 0 | Status: DISCONTINUED | OUTPATIENT
Start: 2019-09-24 | End: 2019-10-01

## 2019-09-24 RX ORDER — DEXTROSE MONOHYDRATE, SODIUM CHLORIDE, AND POTASSIUM CHLORIDE 50; .745; 4.5 G/1000ML; G/1000ML; G/1000ML
1000 INJECTION, SOLUTION INTRAVENOUS
Refills: 0 | Status: DISCONTINUED | OUTPATIENT
Start: 2019-09-24 | End: 2019-10-01

## 2019-09-24 RX ORDER — ACETAMINOPHEN 500 MG
1000 TABLET ORAL ONCE
Refills: 0 | Status: COMPLETED | OUTPATIENT
Start: 2019-09-24 | End: 2019-09-24

## 2019-09-24 RX ORDER — METRONIDAZOLE 500 MG
500 TABLET ORAL EVERY 8 HOURS
Refills: 0 | Status: DISCONTINUED | OUTPATIENT
Start: 2019-09-24 | End: 2019-10-01

## 2019-09-24 RX ORDER — MAGNESIUM SULFATE 500 MG/ML
2 VIAL (ML) INJECTION ONCE
Refills: 0 | Status: DISCONTINUED | OUTPATIENT
Start: 2019-09-24 | End: 2019-09-24

## 2019-09-24 RX ORDER — POTASSIUM CHLORIDE 20 MEQ
20 PACKET (EA) ORAL
Refills: 0 | Status: COMPLETED | OUTPATIENT
Start: 2019-09-24 | End: 2019-09-24

## 2019-09-24 RX ADMIN — Medication 1000 MILLIGRAM(S): at 16:49

## 2019-09-24 RX ADMIN — Medication 15 MILLIGRAM(S): at 12:12

## 2019-09-24 RX ADMIN — Medication 15 MILLIGRAM(S): at 06:08

## 2019-09-24 RX ADMIN — HEPARIN SODIUM 5000 UNIT(S): 5000 INJECTION INTRAVENOUS; SUBCUTANEOUS at 05:19

## 2019-09-24 RX ADMIN — Medication 15 MILLIGRAM(S): at 13:33

## 2019-09-24 RX ADMIN — Medication 15 MILLIGRAM(S): at 05:21

## 2019-09-24 RX ADMIN — DEXTROSE MONOHYDRATE, SODIUM CHLORIDE, AND POTASSIUM CHLORIDE 125 MILLILITER(S): 50; .745; 4.5 INJECTION, SOLUTION INTRAVENOUS at 22:22

## 2019-09-24 RX ADMIN — Medication 1 SPRAY(S): at 05:19

## 2019-09-24 RX ADMIN — Medication 400 MILLIGRAM(S): at 16:09

## 2019-09-24 RX ADMIN — Medication 15 MILLIGRAM(S): at 17:29

## 2019-09-24 RX ADMIN — Medication 50 MILLIEQUIVALENT(S): at 17:20

## 2019-09-24 RX ADMIN — Medication 100 MILLIGRAM(S): at 22:06

## 2019-09-24 RX ADMIN — Medication 100 MILLIGRAM(S): at 18:41

## 2019-09-24 RX ADMIN — HEPARIN SODIUM 5000 UNIT(S): 5000 INJECTION INTRAVENOUS; SUBCUTANEOUS at 17:20

## 2019-09-24 RX ADMIN — Medication 50 MILLIGRAM(S): at 13:44

## 2019-09-24 RX ADMIN — Medication 15 MILLIGRAM(S): at 17:26

## 2019-09-24 RX ADMIN — Medication 100 MILLIGRAM(S): at 13:44

## 2019-09-24 RX ADMIN — Medication 100 MILLIGRAM(S): at 05:19

## 2019-09-24 RX ADMIN — Medication 50 MILLIEQUIVALENT(S): at 12:13

## 2019-09-24 RX ADMIN — Medication 50 MILLIGRAM(S): at 05:19

## 2019-09-24 RX ADMIN — PANTOPRAZOLE SODIUM 40 MILLIGRAM(S): 20 TABLET, DELAYED RELEASE ORAL at 12:13

## 2019-09-24 RX ADMIN — Medication 50 MILLIEQUIVALENT(S): at 21:08

## 2019-09-24 NOTE — CHART NOTE - NSCHARTNOTEFT_GEN_A_CORE
PGY-2 Note     Patient seen and examined. Pain well controlled at this time. No complaints at this time. denies vomiting, chest pain, shortness of breath, severe abdominal pain, heavy vaginal bleeding. Is ambulating, passing flatus and reports +bowel movements. Reports fevers and chills.     Physical Exam:  Vital Signs:  T(C): 37.7 (09-24-19 @ 15:35), Max: 37.7 (09-24-19 @ 15:35) 100.4 T bedside   HR: 127 (09-24-19 @ 15:35) (93 - 127)  BP: 133/81 (09-24-19 @ 15:35) (113/70 - 133/81)  RR: 20 (09-24-19 @ 15:35) (18 - 20)  SpO2: 97% (09-24-19 @ 01:30) (97% - 97%)    Gen: NAD, A&Ox3  Heart: S1S2,RRR  Lungs: CTAB  Abd: ND, soft, NT, BS+, incision c/d/i, no surrounding erythema or edema   VE: Deferred, no active bleeding  Ext: SCDs, no edema or calf tenderness bilaterally    Labs:                        8.5    10.78 )-----------( 349      ( 24 Sep 2019 01:57 )             27.7   09-24    147<H>  |  102  |  29<H>  ----------------------------<  117<H>  3.0<L>   |  33<H>  |  0.8    Ca    7.1<L>      24 Sep 2019 01:57  Phos  3.0     09-24  Mg     2.8     09-24    TPro  5.7<L>  /  Alb  2.8<L>  /  TBili  0.6  /  DBili  x   /  AST  19  /  ALT  15  /  AlkPhos  80  09-23          Medications:  aztreonam  IVPB      aztreonam  IVPB 1000 milliGRAM(s) IV Intermittent every 8 hours  benzocaine 20% Spray 1 Spray(s) Topical every 4 hours  clindamycin IVPB 900 milliGRAM(s) IV Intermittent every 8 hours  clindamycin IVPB      dextrose 5% + sodium chloride 0.9% with potassium chloride 20 mEq/L 1000 milliLiter(s) IV Continuous <Continuous>  heparin  Injectable 5000 Unit(s) SubCutaneous every 12 hours  ketorolac   Injectable 15 milliGRAM(s) IV Push every 6 hours  ondansetron Injectable 8 milliGRAM(s) IV Push every 8 hours PRN  pantoprazole  Injectable 40 milliGRAM(s) IV Push daily  potassium chloride  20 mEq/100 mL IVPB 20 milliEquivalent(s) IV Intermittent every 2 hours      -IV tylenol for fever. to follow up later this evening    Dr. Lee bedside PGY-2 Note     Called by nursing staff for temp of 100.4.  Patient seen and examined at bedside, denies symptoms at this time. Pain well controlled at this time. Reports feeling chills because her hair was wet and she was in XRAY and the room was cold.  Denies feeling febrile.  Denies chest pain, palpitations, SOB, abdominal pain, incisional drainage, vaginal bleeding or discharge.  Is ambulating, reports passing flatus and continued bowel movements. No water or food at bedside.  NG output much less then previous shift.    Physical Exam:  Vital Signs:  T(C): 37.7 (09-24-19 @ 15:35), Max: 37.7 (09-24-19 @ 15:35) 100.4 T bedside   HR: 127 (09-24-19 @ 15:35) (93 - 127)  BP: 133/81 (09-24-19 @ 15:35) (113/70 - 133/81)  RR: 20 (09-24-19 @ 15:35) (18 - 20)  SpO2: 97% (09-24-19 @ 01:30) (97% - 97%)    Gen: NAD, A&Ox3  Heart: S1S2,RRR  Lungs: CTAB  Abd: ND, soft, NT, BS+, incision c/d/i, no surrounding erythema or edema   VE: Deferred, no active bleeding  Ext: SCDs, no edema or calf tenderness bilaterally    Labs:                        8.5    10.78 )-----------( 349      ( 24 Sep 2019 01:57 )             27.7   09-24    147<H>  |  102  |  29<H>  ----------------------------<  117<H>  3.0<L>   |  33<H>  |  0.8    Ca    7.1<L>      24 Sep 2019 01:57  Phos  3.0     09-24  Mg     2.8     09-24    TPro  5.7<L>  /  Alb  2.8<L>  /  TBili  0.6  /  DBili  x   /  AST  19  /  ALT  15  /  AlkPhos  80  09-23          Medications:  aztreonam  IVPB      aztreonam  IVPB 1000 milliGRAM(s) IV Intermittent every 8 hours  benzocaine 20% Spray 1 Spray(s) Topical every 4 hours  clindamycin IVPB 900 milliGRAM(s) IV Intermittent every 8 hours  clindamycin IVPB      dextrose 5% + sodium chloride 0.9% with potassium chloride 20 mEq/L 1000 milliLiter(s) IV Continuous <Continuous>  heparin  Injectable 5000 Unit(s) SubCutaneous every 12 hours  ketorolac   Injectable 15 milliGRAM(s) IV Push every 6 hours  ondansetron Injectable 8 milliGRAM(s) IV Push every 8 hours PRN  pantoprazole  Injectable 40 milliGRAM(s) IV Push daily  potassium chloride  20 mEq/100 mL IVPB 20 milliEquivalent(s) IV Intermittent every 2 hours      Persistent temps, low grade, tachycardia, sat 93-97%, recent LE duplex neg, recent CT angio neg, on anticoagulation, currently asymptomatic,   -IV tylenol for fever  -f/u labs 2000  -replete K+ prn  -continue NPO  -monitor NG output  -monitor temps, if spikes again consider repeat BCx  -if persistent tachycardia, desats, or becomes symptomatic consider ABG or repeat CT angio  -reassess in AM    Dr. Lee bedside

## 2019-09-24 NOTE — PROGRESS NOTE ADULT - ATTENDING COMMENTS
48yo female with PMHx/PSHx of total colectomy with primary anastomosis for UC s/p robotic total hysterectomy with salphino-oophorectomy, extensive NEMO 9/17 now with ileus versus obstruction. CT A/P from 9/20 demonstrates diffuse small bowel dilatation without transition point, large hyperattenuating right lower quadrant intraperitoneal structure with gas - hematoma versus hemostatic material, and extensive soft tissue emphysema. NG tube was placed. Patient states she is feeling better this morning, +BM, +flatus. Denies nausea/vomiting, chest pain or SOB. Patient is reports feeling mentally overwhelmed, asking for support animal and make over. Exam is soft, well-healing wounds, mildly distended, no rebound/guarding.    Recommend replacing electrolytes PRN, hold antimotility medications, monitor bowel function, monitor I/O. If NG tube output is <200 cc/24h, may remove NG tube. Moistening mouth with ice chips and swishing is okay. Consider repeat CT A/P with PO and IV contrast if clinically worsens. Patient is refusing repeat imaging at this time since she is feeling better.

## 2019-09-24 NOTE — CONSULT NOTE ADULT - ASSESSMENT
48yo P1 h/o UC with total colectomy, h/o DCIS s/p lumpectomy with radiation, h/o unprovoked PE found to have prothrombin mutation (heterozygous), with fibroid uterus and pelvic pain s/p RATLH, BSO, cystoscopy, EBL 250cc, complicated by postoperative ileus, intermittent fevers.    IMPRESSION:  Presently asymptomatic  PE unremarkable  no obvious infectious focus  No phlebitis  Clinically no PNA  NO intraabdominal abscess  No SSTI at surgical site  WBC10.7  BCx 9/23 NG    RECOMMENDATIONS:  Po Levoquin 750 mg q24h  Po Flagyl 500 mg q8h  Duration 7 days

## 2019-09-24 NOTE — PROGRESS NOTE ADULT - ASSESSMENT
50yo P1 h/o UC with total colectomy, h/o DCIS s/p lumpectomy with radiation, h/o unprovoked PE found to have prothrombin mutation (heterozygous), with fibroid uterus and pelvic pain s/p RATLH, BSO, cystoscopy, EBL 250cc, complicated by postoperative ileus, intermittent fevers,  POD #7  GI: NG to LCS, draining green fluid, high output, continue NPO, maintain until output  100-200cc per shift  DVT prophylaxis: heparin BID, ambulation  Neuro: pain control  Cardio: no dx  Pulm: incentive spirometer and ambulation  ID: fevers of unknown origin, started on aztreonam and clinda, pending ID consult; BCx pending, UCx to be collected  Endo: metabolic alkalosis on ABG yesterday likely secondary to NG suctioning  : urinating, bump in Cr now downtrending avoid nephrotoxic medications, no vaginal bleeding or foul smelling discharge  Heme: H/H stable    Plan: monitor NG output, keep NPO, monitor temps, send UCx    Will inform attending 48yo P1 h/o UC with total colectomy, h/o DCIS s/p lumpectomy with radiation, h/o unprovoked PE found to have prothrombin mutation (heterozygous), with fibroid uterus and pelvic pain s/p RATLH, BSO, cystoscopy, EBL 250cc, complicated by postoperative ileus, intermittent fevers,  POD #7  GI: NG to LCS, draining green fluid, high output, continue NPO, maintain until output  100-200cc per shift  DVT prophylaxis: heparin BID, ambulation  Neuro: pain control  Cardio: no dx  Pulm: incentive spirometer and ambulation  ID: fevers of unknown origin, started on aztreonam and clinda, pending ID consult; BCx pending, UCx to be collected  Endo: metabolic alkalosis on ABG yesterday likely secondary to NG suctioning, hypokalemic will replete and order EKG, will discuss maintenance fluids with surgical team  : adequate urine output, bump in Cr now downtrending avoid nephrotoxic medications, no vaginal bleeding or foul smelling discharge  Heme: H/H stable    Plan: monitor NG output, keep NPO, monitor temps, f.u UCx/BCx, repeat K+ with CBC and electrolytes in PM, f/u EKG    Will inform attending

## 2019-09-24 NOTE — PROGRESS NOTE ADULT - SUBJECTIVE AND OBJECTIVE BOX
GENERAL SURGERY PROGRESS NOTE     MARCELLUS WILSON  49y  Female  Hospital day :7d  POD:  Procedure: Lysis, adhesions, pelvis, laparoscopic  Cystoscopy, diagnostic  Laparoscopic total hysterectomy with salpingo-oophorectomy for uterus greater than 250 grams    OVERNIGHT EVENTS: No acute events. Patient has no abdominal pain, nausea, or vomiting. She is passing gas and having BMs. NGT output 7am-midnight was 300cc.     T(F): 97.6 (19 @ 23:00), Max: 97.9 (19 @ 07:19)  HR: 120 (19 @ 23:00) (104 - 120)  BP: 114/56 (19 23:00) (110/66 - 135/90)  ABP: --  ABP(mean): --  RR: 18 (19 @ 23:00) (17 - 18)  SpO2: 95% (19 @ 05:14) (95% - 95%)    DIET/FLUIDS: lactated ringers. 1000 milliLiter(s) IV Continuous <Continuous>  sodium chloride 0.9%. 1000 milliLiter(s) IV Continuous <Continuous>    N19 @ 07:01  -  19 @ 07:00  --------------------------------------------------------  OUT: 2400 mL                                                                                 DRAINS:     BM:   19 @ 07:  -  19 @ 07:00  --------------------------------------------------------  OUT: 300 mL      EMESIS:   19 @ 07:01  -  19 @ 07:00  --------------------------------------------------------  OUT: 26.47 mL/kg/d      URINE:      GI proph:  pantoprazole  Injectable 40 milliGRAM(s) IV Push daily    AC/ proph: heparin  Injectable 5000 Unit(s) SubCutaneous every 12 hours    ABx: aztreonam  IVPB      aztreonam  IVPB 1000 milliGRAM(s) IV Intermittent every 8 hours  clindamycin IVPB 900 milliGRAM(s) IV Intermittent every 8 hours  clindamycin IVPB          PHYSICAL EXAM:  GENERAL: NAD, well-appearing, NGT   CHEST/LUNG: Clear to auscultation bilaterally  HEART: Regular rate and rhythm  ABDOMEN: Soft, Nontender, Nondistended;   EXTREMITIES:  No clubbing, cyanosis, or edema      LABS  Labs:  CAPILLARY BLOOD GLUCOSE                              9.2    11.52 )-----------( 427      ( 23 Sep 2019 14:30 )             29.4       Auto Neutrophil %: 81.0 % (19 @ 14:30)  Auto Immature Granulocyte %: 1.0 % (19 @ 14:30)        148<H>  |  97<L>  |  36<H>  ----------------------------<  118<H>  3.2<L>   |  37<H>  |  0.9      Calcium, Total Serum: 7.7 mg/dL (19 @ 14:30)      LFTs:             5.7  | 0.6  | 19       ------------------[80      ( 23 Sep 2019 00:55 )  2.8  | x    | 15          Lipase:x      Amylase:x         Lactate, Blood: 1.2 mmol/L (19 @ 14:30)  Blood Gas Arterial, Lactate: 1.1 mmoL/L (19 @ 02:00)  Lactate, Blood: 1.3 mmol/L (19 @ 00:55)  Blood Gas Arterial, Lactate: 1.2 mmoL/L (19 @ 13:31)    ABG - ( 23 Sep 2019 02:00 )  pH: 7.55  /  pCO2: 44    /  pO2: 89    / HCO3: 38    / Base Excess: 14.0  /  SaO2: 97              ABG - ( 22 Sep 2019 13:31 )  pH: 7.55  /  pCO2: 40    /  pO2: 70    / HCO3: 35    / Base Excess: 11.5  /  SaO2: 95                Coags:     13.70  ----< 1.19    ( 23 Sep 2019 00:55 )     35.7        CARDIAC MARKERS ( 23 Sep 2019 00:55 )  x     / <0.01 ng/mL / 107 U/L / x     / 1.3 ng/mL                  RADIOLOGY & ADDITIONAL TESTS:    < from: CT Abdomen and Pelvis w/ Oral Cont and w/ IV Cont (19 @ 14:18) >  IMPRESSION:  1.  Status post colectomy. Diffuse small bowel dilatation tapering to   normal caliber distally with no discrete transition point identified.   Findings may represent ileus versus early/developing small bowel   obstruction.  Oral contrast does not transit past proximal jejunum.  2.  Large hyperattenuating right lower quadrant intraperitoneal structure   with gas may represent hematoma-possibly infected versus surgical   hemostatic material. Correlation with operative report recommended.  3.  Extensive soft tissue emphysema extending from the chest size with   interfascial extension noted in the right thigh - unclear if completely   postsurgical. Correlation with operative report and physical exam is   recommended.      < end of copied text > GENERAL SURGERY PROGRESS NOTE     MARCELLUS WILSON  49y  Female  Hospital day :7d  POD:  Procedure: Lysis, adhesions, pelvis, laparoscopic  Cystoscopy, diagnostic  Laparoscopic total hysterectomy with salpingo-oophorectomy for uterus greater than 250 grams    OVERNIGHT EVENTS: No acute events. Patient has no abdominal pain, nausea, or vomiting. She is passing gas and having BMs. NGT output 7am-midnight was 300cc.     T(F): 97.6 (19 @ 23:00), Max: 97.9 (19 @ 07:19)  HR: 120 (19 @ 23:00) (104 - 120)  BP: 114/56 (19 23:00) (110/66 - 135/90)  ABP: --  ABP(mean): --  RR: 18 (19 @ 23:00) (17 - 18)  SpO2: 95% (19 @ 05:14) (95% - 95%)    DIET/FLUIDS: lactated ringers. 1000 milliLiter(s) IV Continuous <Continuous>  sodium chloride 0.9%. 1000 milliLiter(s) IV Continuous <Continuous>    N19 @ 07:01  -  19 @ 07:00  --------------------------------------------------------  OUT: 2400 mL                                                                                 DRAINS:     BM:   19 @ 07:  -  19 @ 07:00  --------------------------------------------------------  OUT: 300 mL      EMESIS:   19 @ 07:01  -  19 @ 07:00  --------------------------------------------------------  OUT: 26.47 mL/kg/d      URINE:      GI proph:  pantoprazole  Injectable 40 milliGRAM(s) IV Push daily    AC/ proph: heparin  Injectable 5000 Unit(s) SubCutaneous every 12 hours    ABx: aztreonam  IVPB      aztreonam  IVPB 1000 milliGRAM(s) IV Intermittent every 8 hours  clindamycin IVPB 900 milliGRAM(s) IV Intermittent every 8 hours  clindamycin IVPB          PHYSICAL EXAM:  GENERAL: NAD, well-appearing, NGT   CHEST/LUNG: Clear to auscultation bilaterally  HEART: Regular rate and rhythm  ABDOMEN: Soft, Nontender, Nondistended;   EXTREMITIES:  No clubbing, cyanosis, or edema      LABS  Labs:  CAPILLARY BLOOD GLUCOSE                              9.2    11.52 )-----------( 427      ( 23 Sep 2019 14:30 )             29.4       Auto Neutrophil %: 81.0 % (19 @ 14:30)  Auto Immature Granulocyte %: 1.0 % (19 @ 14:30)        148<H>  |  97<L>  |  36<H>  ----------------------------<  118<H>  3.2<L>   |  37<H>  |  0.9      Calcium, Total Serum: 7.7 mg/dL (19 @ 14:30)      LFTs:             5.7  | 0.6  | 19       ------------------[80      ( 23 Sep 2019 00:55 )  2.8  | x    | 15          Lipase:x      Amylase:x        Lactate, Blood: 1.2 mmol/L (19 @ 14:30)  Blood Gas Arterial, Lactate: 1.1 mmoL/L (19 @ 02:00)  Lactate, Blood: 1.3 mmol/L (19 @ 00:55)  Blood Gas Arterial, Lactate: 1.2 mmoL/L (19 @ 13:31)    ABG - ( 23 Sep 2019 02:00 )  pH: 7.55  /  pCO2: 44    /  pO2: 89    / HCO3: 38    / Base Excess: 14.0  /  SaO2: 97              ABG - ( 22 Sep 2019 13:31 )  pH: 7.55  /  pCO2: 40    /  pO2: 70    / HCO3: 35    / Base Excess: 11.5  /  SaO2: 95                Coags:     13.70  ----< 1.19    ( 23 Sep 2019 00:55 )     35.7        CARDIAC MARKERS ( 23 Sep 2019 00:55 )  x     / <0.01 ng/mL / 107 U/L / x     / 1.3 ng/mL                  RADIOLOGY & ADDITIONAL TESTS:    < from: CT Abdomen and Pelvis w/ Oral Cont and w/ IV Cont (19 @ 14:18) >  IMPRESSION:  1.  Status post colectomy. Diffuse small bowel dilatation tapering to   normal caliber distally with no discrete transition point identified.   Findings may represent ileus versus early/developing small bowel   obstruction.  Oral contrast does not transit past proximal jejunum.  2.  Large hyperattenuating right lower quadrant intraperitoneal structure   with gas may represent hematoma-possibly infected versus surgical   hemostatic material. Correlation with operative report recommended.  3.  Extensive soft tissue emphysema extending from the chest size with   interfascial extension noted in the right thigh - unclear if completely   postsurgical. Correlation with operative report and physical exam is   recommended.      < end of copied text >

## 2019-09-24 NOTE — CONSULT NOTE ADULT - SUBJECTIVE AND OBJECTIVE BOX
MARCELLUS WILSON  49y, Female  Allergy: codeine (Vomiting; Nausea)  latex (Anaphylaxis)  penicillin (Other)      HPI:  · Assessment		  50yo P1 h/o UC with total colectomy, h/o DCIS s/p lumpectomy with radiation, h/o unprovoked PE found to have prothrombin mutation (heterozygous), with fibroid uterus and pelvic pain s/p RATLH, BSO, cystoscopy, EBL 250cc, complicated by postoperative ileus, intermittent fevers,    FAMILY HISTORY:    PAST MEDICAL & SURGICAL HISTORY:  OA (osteoarthritis)  Anxiety  Breast cancer: left breast cancer,, lumpectomy&amp;  radiation (~2017)  S/P colectomy: hx ulcerative colitis  Liver disease: &quot;twisted liver&quot;, hemangioma  MVP (mitral valve prolapse)  Bell palsy  Pulmonary embolism: &gt; 5 yrs ago, no current meds, pt is unsure of cause  Breast cancer: left side, lumpectomy  H/O colectomy: colostom&amp; reversal pt sttaes pouch is on the left side  S/P lumpectomy, left breast        VITALS:  T(F): 98.4, Max: 98.4 (09-24-19 @ 07:48)  HR: 93  BP: 113/70  RR: 19Vital Signs Last 24 Hrs  T(C): 36.9 (24 Sep 2019 07:48), Max: 36.9 (24 Sep 2019 07:48)  T(F): 98.4 (24 Sep 2019 07:48), Max: 98.4 (24 Sep 2019 07:48)  HR: 93 (24 Sep 2019 07:48) (93 - 120)  BP: 113/70 (24 Sep 2019 07:48) (113/70 - 115/67)  BP(mean): --  RR: 19 (24 Sep 2019 07:48) (18 - 20)  SpO2: 97% (24 Sep 2019 01:30) (97% - 97%)    TESTS & MEASUREMENTS:                        8.5    10.78 )-----------( 349      ( 24 Sep 2019 01:57 )             27.7     09-24    147<H>  |  102  |  29<H>  ----------------------------<  117<H>  3.0<L>   |  33<H>  |  0.8    Ca    7.1<L>      24 Sep 2019 01:57  Phos  3.0     09-24  Mg     2.8     09-24    TPro  5.7<L>  /  Alb  2.8<L>  /  TBili  0.6  /  DBili  x   /  AST  19  /  ALT  15  /  AlkPhos  80  09-23    LIVER FUNCTIONS - ( 23 Sep 2019 00:55 )  Alb: 2.8 g/dL / Pro: 5.7 g/dL / ALK PHOS: 80 U/L / ALT: 15 U/L / AST: 19 U/L / GGT: x             Culture - Blood (collected 09-23-19 @ 01:00)  Source: .Blood Blood-Venous  Preliminary Report (09-24-19 @ 07:01):    No growth to date.            RADIOLOGY & ADDITIONAL TESTS:    ANTIBIOTICS:  aztreonam  IVPB      aztreonam  IVPB 1000 milliGRAM(s) IV Intermittent every 8 hours  clindamycin IVPB 900 milliGRAM(s) IV Intermittent every 8 hours  clindamycin IVPB

## 2019-09-24 NOTE — PROGRESS NOTE ADULT - SUBJECTIVE AND OBJECTIVE BOX
PGY 4 Note    Patient examined at bedside, no overnight events, pain well controlled.  Afebrile now >24hrs, started on IV abx for prophylaxis.  C/o dry mouth and hunger.  Continues to have high NG output.  Watcher pitcher at bedside per pt for swabs and not drinking, discouraged from swallowing any fluids and maintaining NPO for relief of ileus/obstruction.  Pt vocalized understanding.  Per pt passing flatus and having BMs.  Continues to ambulate.    T(F): 97.6 (09-23-19 @ 23:00), Max: 97.9 (09-23-19 @ 07:19)  HR: 120 (09-23-19 @ 23:00) (104 - 120)  BP: 114/56 (09-23-19 @ 23:00) (110/66 - 115/67)  RR: 20 (09-24-19 @ 01:30) (18 - 20)  SpO2: 97% (09-24-19 @ 01:30) (97% - 97%)    I&O's Summary    22 Sep 2019 07:01  -  23 Sep 2019 07:00  --------------------------------------------------------  IN: 0 mL / OUT: 4500 mL / NET: -4500 mL    23 Sep 2019 07:01  -  24 Sep 2019 06:26  --------------------------------------------------------  IN: 2925 mL / OUT: 3000 mL / NET: -75 mL      Urine:     NG Tube:   09-22-19 @ 07:01  -  09-23-19 @ 07:00  --------------------------------------------------------  IN: 0 mL / OUT: 2400 mL / NET: -2400 mL    09-23-19 @ 07:01  -  09-24-19 @ 06:26  --------------------------------------------------------  IN: 0 mL / OUT: 1100 mL / NET: -1100 mL    Physical Exam:  General: NAD  CVS: RRR. Nl S1S2  Lungs: CTAB  Abdomen: soft, non-tender, mildly distended, hypoactive BS  Incision: C/D/I, no erythema, no draining  VE: deferred, no bleeding on pad/chux  Ext: No edema. no calf tenderness.    Labs:             8.5<L>  10.78 )-----------( 349      ( 09-24 @ 01:57 )             27.7<L>               9.2<L>  11.52<H> )-----------( 427<H>    ( 09-23 @ 14:30 )             29.4<L>               10.0<L>  8.86  )-----------( 451<H>    ( 09-23 @ 00:55 )             31.7<L>               9.6<L>  4.38<L> )-----------( 358      ( 09-21 @ 06:25 )             31.2<L>               9.8<L>  5.76  )-----------( 295      ( 09-20 @ 08:38 )             31.6<L>     09-24    147<H>  |  102  |  29<H>  ----------------------------<  117<H>  3.0<L>   |  33<H>  |  0.8    Ca    7.1<L>      24 Sep 2019 01:57  Phos  3.0     09-24  Mg     2.8     09-24    TPro  5.7<L>  /  Alb  2.8<L>  /  TBili  0.6  /  DBili  x   /  AST  19  /  ALT  15  /  AlkPhos  80  09-23      Trend:             8.5<L>  10.78 )-----------( 349      ( 09-24 @ 01:57 )             27.7<L>               9.2<L>  11.52<H> )-----------( 427<H>    ( 09-23 @ 14:30 )             29.4<L>               10.0<L>  8.86  )-----------( 451<H>    ( 09-23 @ 00:55 )             31.7<L>               9.6<L>  4.38<L> )-----------( 358      ( 09-21 @ 06:25 )             31.2<L>               9.8<L>  5.76  )-----------( 295      ( 09-20 @ 08:38 )             31.6<L>        Creatinine, Serum: 0.8 (09-24)  Creatinine, Serum: 0.9 (09-23)  Creatinine, Serum: 1.1 (09-23)  Creatinine, Serum: 0.8 (09-21)  Creatinine, Serum: 0.7 (09-20)  Creatinine, Serum: 0.7 (09-19)  Creatinine, Serum: 0.9 (09-17)      Medications:  MEDICATIONS  (STANDING):  aztreonam  IVPB      aztreonam  IVPB 1000 milliGRAM(s) IV Intermittent every 8 hours  benzocaine 20% Spray 1 Spray(s) Topical every 4 hours  clindamycin IVPB 900 milliGRAM(s) IV Intermittent every 8 hours  clindamycin IVPB      heparin  Injectable 5000 Unit(s) SubCutaneous every 12 hours  ketorolac   Injectable 15 milliGRAM(s) IV Push every 6 hours  lactated ringers. 1000 milliLiter(s) (125 mL/Hr) IV Continuous <Continuous>  pantoprazole  Injectable 40 milliGRAM(s) IV Push daily  sodium chloride 0.9%. 1000 milliLiter(s) (150 mL/Hr) IV Continuous <Continuous>    MEDICATIONS  (PRN):  ondansetron Injectable 8 milliGRAM(s) IV Push every 8 hours PRN Nausea and/or Vomiting

## 2019-09-24 NOTE — PROGRESS NOTE ADULT - ASSESSMENT
49 year old female s/p laparoscopic total hysterectomy with salpingo-oophorectomy on 9/17 with ileus. Resolving clinically as patient is having gas, bm, and is without nausea or vomiting     Plan:   - NGT to suction   - NPO   - monitor bowel function

## 2019-09-25 LAB
-  AMIKACIN: SIGNIFICANT CHANGE UP
-  AMIKACIN: SIGNIFICANT CHANGE UP
-  AMPICILLIN/SULBACTAM: SIGNIFICANT CHANGE UP
-  AMPICILLIN/SULBACTAM: SIGNIFICANT CHANGE UP
-  AMPICILLIN: SIGNIFICANT CHANGE UP
-  AMPICILLIN: SIGNIFICANT CHANGE UP
-  AZTREONAM: SIGNIFICANT CHANGE UP
-  AZTREONAM: SIGNIFICANT CHANGE UP
-  CEFAZOLIN: SIGNIFICANT CHANGE UP
-  CEFAZOLIN: SIGNIFICANT CHANGE UP
-  CEFEPIME: SIGNIFICANT CHANGE UP
-  CEFEPIME: SIGNIFICANT CHANGE UP
-  CEFOXITIN: SIGNIFICANT CHANGE UP
-  CEFOXITIN: SIGNIFICANT CHANGE UP
-  CEFTRIAXONE: SIGNIFICANT CHANGE UP
-  CEFTRIAXONE: SIGNIFICANT CHANGE UP
-  CIPROFLOXACIN: SIGNIFICANT CHANGE UP
-  CIPROFLOXACIN: SIGNIFICANT CHANGE UP
-  GENTAMICIN: SIGNIFICANT CHANGE UP
-  GENTAMICIN: SIGNIFICANT CHANGE UP
-  IMIPENEM: SIGNIFICANT CHANGE UP
-  IMIPENEM: SIGNIFICANT CHANGE UP
-  LEVOFLOXACIN: SIGNIFICANT CHANGE UP
-  LEVOFLOXACIN: SIGNIFICANT CHANGE UP
-  MEROPENEM: SIGNIFICANT CHANGE UP
-  MEROPENEM: SIGNIFICANT CHANGE UP
-  NITROFURANTOIN: SIGNIFICANT CHANGE UP
-  NITROFURANTOIN: SIGNIFICANT CHANGE UP
-  PIPERACILLIN/TAZOBACTAM: SIGNIFICANT CHANGE UP
-  PIPERACILLIN/TAZOBACTAM: SIGNIFICANT CHANGE UP
-  TIGECYCLINE: SIGNIFICANT CHANGE UP
-  TIGECYCLINE: SIGNIFICANT CHANGE UP
-  TOBRAMYCIN: SIGNIFICANT CHANGE UP
-  TOBRAMYCIN: SIGNIFICANT CHANGE UP
-  TRIMETHOPRIM/SULFAMETHOXAZOLE: SIGNIFICANT CHANGE UP
-  TRIMETHOPRIM/SULFAMETHOXAZOLE: SIGNIFICANT CHANGE UP
ANION GAP SERPL CALC-SCNC: 8 MMOL/L — SIGNIFICANT CHANGE UP (ref 7–14)
BASOPHILS # BLD AUTO: 0 K/UL — SIGNIFICANT CHANGE UP (ref 0–0.2)
BASOPHILS NFR BLD AUTO: 0 % — SIGNIFICANT CHANGE UP (ref 0–1)
BUN SERPL-MCNC: 17 MG/DL — SIGNIFICANT CHANGE UP (ref 10–20)
CALCIUM SERPL-MCNC: 7.6 MG/DL — LOW (ref 8.5–10.1)
CHLORIDE SERPL-SCNC: 111 MMOL/L — HIGH (ref 98–110)
CO2 SERPL-SCNC: 31 MMOL/L — SIGNIFICANT CHANGE UP (ref 17–32)
CREAT SERPL-MCNC: 0.7 MG/DL — SIGNIFICANT CHANGE UP (ref 0.7–1.5)
CULTURE RESULTS: SIGNIFICANT CHANGE UP
EOSINOPHIL # BLD AUTO: 0.43 K/UL — SIGNIFICANT CHANGE UP (ref 0–0.7)
EOSINOPHIL NFR BLD AUTO: 2.6 % — SIGNIFICANT CHANGE UP (ref 0–8)
GIANT PLATELETS BLD QL SMEAR: PRESENT — SIGNIFICANT CHANGE UP
GLUCOSE SERPL-MCNC: 146 MG/DL — HIGH (ref 70–99)
HCT VFR BLD CALC: 27.8 % — LOW (ref 37–47)
HGB BLD-MCNC: 8.4 G/DL — LOW (ref 12–16)
HYPOCHROMIA BLD QL: SLIGHT — SIGNIFICANT CHANGE UP
LYMPHOCYTES # BLD AUTO: 1.27 K/UL — SIGNIFICANT CHANGE UP (ref 1.2–3.4)
LYMPHOCYTES # BLD AUTO: 7.7 % — LOW (ref 20.5–51.1)
MAGNESIUM SERPL-MCNC: 2.5 MG/DL — HIGH (ref 1.8–2.4)
MANUAL SMEAR VERIFICATION: SIGNIFICANT CHANGE UP
MCHC RBC-ENTMCNC: 24.7 PG — LOW (ref 27–31)
MCHC RBC-ENTMCNC: 30.2 G/DL — LOW (ref 32–37)
MCV RBC AUTO: 81.8 FL — SIGNIFICANT CHANGE UP (ref 81–99)
METAMYELOCYTES # FLD: 0.9 % — HIGH (ref 0–0)
METHOD TYPE: SIGNIFICANT CHANGE UP
METHOD TYPE: SIGNIFICANT CHANGE UP
MONOCYTES # BLD AUTO: 0.43 K/UL — SIGNIFICANT CHANGE UP (ref 0.1–0.6)
MONOCYTES NFR BLD AUTO: 2.6 % — SIGNIFICANT CHANGE UP (ref 1.7–9.3)
MYELOCYTES NFR BLD: 2.6 % — HIGH (ref 0–0)
NEUTROPHILS # BLD AUTO: 13.46 K/UL — HIGH (ref 1.4–6.5)
NEUTROPHILS NFR BLD AUTO: 75.9 % — HIGH (ref 42.2–75.2)
NEUTS BAND # BLD: 6 % — SIGNIFICANT CHANGE UP (ref 0–6)
ORGANISM # SPEC MICROSCOPIC CNT: SIGNIFICANT CHANGE UP
PHOSPHATE SERPL-MCNC: 1.6 MG/DL — LOW (ref 2.1–4.9)
PLAT MORPH BLD: NORMAL — SIGNIFICANT CHANGE UP
PLATELET # BLD AUTO: 401 K/UL — HIGH (ref 130–400)
POLYCHROMASIA BLD QL SMEAR: SIGNIFICANT CHANGE UP
POTASSIUM SERPL-MCNC: 3.6 MMOL/L — SIGNIFICANT CHANGE UP (ref 3.5–5)
POTASSIUM SERPL-SCNC: 3.6 MMOL/L — SIGNIFICANT CHANGE UP (ref 3.5–5)
PROMYELOCYTES # FLD: 0.9 % — HIGH (ref 0–0)
RBC # BLD: 3.4 M/UL — LOW (ref 4.2–5.4)
RBC # FLD: 17.1 % — HIGH (ref 11.5–14.5)
RBC BLD AUTO: NORMAL — SIGNIFICANT CHANGE UP
SODIUM SERPL-SCNC: 150 MMOL/L — HIGH (ref 135–146)
SPECIMEN SOURCE: SIGNIFICANT CHANGE UP
VARIANT LYMPHS # BLD: 0.8 % — SIGNIFICANT CHANGE UP (ref 0–5)
WBC # BLD: 16.44 K/UL — HIGH (ref 4.8–10.8)
WBC # FLD AUTO: 16.44 K/UL — HIGH (ref 4.8–10.8)

## 2019-09-25 PROCEDURE — 99024 POSTOP FOLLOW-UP VISIT: CPT

## 2019-09-25 PROCEDURE — 99232 SBSQ HOSP IP/OBS MODERATE 35: CPT

## 2019-09-25 RX ORDER — SODIUM,POTASSIUM PHOSPHATES 278-250MG
1 POWDER IN PACKET (EA) ORAL ONCE
Refills: 0 | Status: COMPLETED | OUTPATIENT
Start: 2019-09-25 | End: 2019-09-25

## 2019-09-25 RX ORDER — POTASSIUM PHOSPHATE, MONOBASIC POTASSIUM PHOSPHATE, DIBASIC 236; 224 MG/ML; MG/ML
15 INJECTION, SOLUTION INTRAVENOUS ONCE
Refills: 0 | Status: COMPLETED | OUTPATIENT
Start: 2019-09-25 | End: 2019-09-25

## 2019-09-25 RX ADMIN — Medication 100 MILLIGRAM(S): at 05:37

## 2019-09-25 RX ADMIN — DEXTROSE MONOHYDRATE, SODIUM CHLORIDE, AND POTASSIUM CHLORIDE 125 MILLILITER(S): 50; .745; 4.5 INJECTION, SOLUTION INTRAVENOUS at 12:05

## 2019-09-25 RX ADMIN — POTASSIUM PHOSPHATE, MONOBASIC POTASSIUM PHOSPHATE, DIBASIC 63.75 MILLIMOLE(S): 236; 224 INJECTION, SOLUTION INTRAVENOUS at 12:05

## 2019-09-25 RX ADMIN — ENOXAPARIN SODIUM 40 MILLIGRAM(S): 100 INJECTION SUBCUTANEOUS at 12:07

## 2019-09-25 RX ADMIN — PANTOPRAZOLE SODIUM 40 MILLIGRAM(S): 20 TABLET, DELAYED RELEASE ORAL at 12:07

## 2019-09-25 RX ADMIN — Medication 100 MILLIGRAM(S): at 21:16

## 2019-09-25 RX ADMIN — Medication 1 PACKET(S): at 12:06

## 2019-09-25 NOTE — PROGRESS NOTE ADULT - ASSESSMENT
50yo P1 h/o UC with total colectomy, h/o DCIS s/p lumpectomy with radiation, h/o unprovoked PE found to have prothrombin mutation (heterozygous), with fibroid uterus and pelvic pain s/p RATLH, BSO, cystoscopy, EBL 250cc, complicated by postoperative ileus, intermittent fevers,  POD #8  GI: NG to LCS, draining green fluid, output much improved, maintain until output <200 over 24hr per surgery, continue NPO  DVT prophylaxis: lovenox qd, ambulation  Neuro: pain control  Cardio: no dx  Pulm: incentive spirometer and ambulation  ID: last temp yesterday afternoon 100.4, switched to levaquin and flagyl per ID, BCx NGTD f/u final, UCx 50,000-90,000 gram neg rods f/u final  Endo: metabolic alkalosis on previous ABG likely secondary to NG suctioning, hypokalemic, repleted K+ 80meq yesterday changed maintenance fluids to D5 1/2 NS, QT prolongation resolved on EKG yesterday, refused labs at 2330 ordered for this AM  : adequate urine output, Cr back to baseline, no vaginal bleeding or foul smelling discharge  Heme: H/H stable, VSS    Plan: monitor NG output, keep NPO, monitor temps, f.u UCx/BCx final, f/u AM labs    Will inform Dr Wayne

## 2019-09-25 NOTE — PROGRESS NOTE ADULT - SUBJECTIVE AND OBJECTIVE BOX
PGY 4 Note    Patient examined at bedside, sitting comfortably.  Reports pain well controlled.  Reports continued flatus and BM.  Denies fevers/chills, nausea/vomiting.  Reports feeling well.  Afebrile overnight, denies CP/SOB/palpitations, vaginal bleeding or discharge, hematuria/dysuria. NPO, no food or drink at bedside this AM. Ambulating. Using incentive spirometry.     T(F): 98.7 (09-25-19 @ 00:00), Max: 100.4 (09-24-19 @ 15:45)  HR: 91 (09-25-19 @ 00:00) (91 - 127)  BP: 97/59 (09-25-19 @ 00:00) (97/59 - 133/81)  RR: 20 (09-25-19 @ 00:00) (19 - 20)  SpO2: --    I&O's Summary    23 Sep 2019 07:01  -  24 Sep 2019 07:00  --------------------------------------------------------  IN: 2925 mL / OUT: 3325 mL / NET: -400 mL    24 Sep 2019 07:01  -  25 Sep 2019 06:18  --------------------------------------------------------  IN: 1400 mL / OUT: 1005 mL / NET: 395 mL      Urine:     NG Tube:   09-23-19 @ 07:01  -  09-24-19 @ 07:00  --------------------------------------------------------  IN: 0 mL / OUT: 1425 mL / NET: -1425 mL    09-24-19 @ 07:01  -  09-25-19 @ 06:18  --------------------------------------------------------  IN: 0 mL / OUT: 305 mL / NET: -305 mL    Physical Exam:  General: NAD, NG in place to suction  CVS: RRR. Nl S1S2  Lungs: CTAB  Abdomen: soft, non-tender, non-distended, BS hypoactive  Incision: C/D/I, no erythema, no draining  VE: deferred, no bleeding on pad/chux  Ext: No edema. SCDs in place    Labs:             8.5<L>  10.78 )-----------( 349      ( 09-24 @ 01:57 )             27.7<L>               9.2<L>  11.52<H> )-----------( 427<H>    ( 09-23 @ 14:30 )             29.4<L>               10.0<L>  8.86  )-----------( 451<H>    ( 09-23 @ 00:55 )             31.7<L>               9.6<L>  4.38<L> )-----------( 358      ( 09-21 @ 06:25 )             31.2<L>               9.8<L>  5.76  )-----------( 295      ( 09-20 @ 08:38 )             31.6<L>     09-24    147<H>  |  102  |  29<H>  ----------------------------<  117<H>  3.0<L>   |  33<H>  |  0.8    Ca    7.1<L>      24 Sep 2019 01:57  Phos  3.0     09-24  Mg     2.8     09-24        Culture - Urine (collected 23 Sep 2019 06:37)  Source: .Urine Clean Catch (Midstream)  Preliminary Report (24 Sep 2019 17:10):    50,000 - 99,000 CFU/mL Gram Negative Rods    Culture - Blood (collected 23 Sep 2019 01:00)  Source: .Blood Blood-Venous  Preliminary Report (24 Sep 2019 07:01):    No growth to date.            Trend:             8.5<L>  10.78 )-----------( 349      ( 09-24 @ 01:57 )             27.7<L>               9.2<L>  11.52<H> )-----------( 427<H>    ( 09-23 @ 14:30 )             29.4<L>               10.0<L>  8.86  )-----------( 451<H>    ( 09-23 @ 00:55 )             31.7<L>               9.6<L>  4.38<L> )-----------( 358      ( 09-21 @ 06:25 )             31.2<L>               9.8<L>  5.76  )-----------( 295      ( 09-20 @ 08:38 )             31.6<L>        Creatinine, Serum: 0.8 (09-24)  Creatinine, Serum: 0.9 (09-23)  Creatinine, Serum: 1.1 (09-23)  Creatinine, Serum: 0.8 (09-21)  Creatinine, Serum: 0.7 (09-20)  Creatinine, Serum: 0.7 (09-19)  Creatinine, Serum: 0.9 (09-17)      Medications:  MEDICATIONS  (STANDING):  benzocaine 20% Spray 1 Spray(s) Topical every 4 hours  dextrose 5% + sodium chloride 0.9% with potassium chloride 20 mEq/L 1000 milliLiter(s) (125 mL/Hr) IV Continuous <Continuous>  enoxaparin Injectable 40 milliGRAM(s) SubCutaneous daily  levoFLOXacin IVPB 750 milliGRAM(s) IV Intermittent every 24 hours  levoFLOXacin IVPB      metroNIDAZOLE  IVPB 500 milliGRAM(s) IV Intermittent every 8 hours  metroNIDAZOLE  IVPB      pantoprazole  Injectable 40 milliGRAM(s) IV Push daily    MEDICATIONS  (PRN):  ondansetron Injectable 8 milliGRAM(s) IV Push every 8 hours PRN Nausea and/or Vomiting

## 2019-09-25 NOTE — PROGRESS NOTE ADULT - ASSESSMENT
49 year old female s/p laparoscopic total hysterectomy with salpingo-oophorectomy on 9/17 with ileus. Resolving clinically as patient is having gas, bm, and is without nausea or vomiting     Plan:   - NGT to suction, output trending down, remove if output <200cc/24 hours  - NPO, IVF, electrolyte repletion PRN  - monitor bowel function  - consider CT A/P w/ PO and IV contrast if clinically worse

## 2019-09-25 NOTE — PROGRESS NOTE ADULT - ATTENDING COMMENTS
48yo female with PMHx/PSHx of total colectomy with primary anastomosis for UC s/p robotic total hysterectomy with salphino-oophorectomy, extensive NEMO 9/17 now with ileus versus obstruction. CT A/P from 9/20 demonstrates diffuse small bowel dilatation without transition point, large hyperattenuating right lower quadrant intraperitoneal structure with gas - hematoma versus hemostatic material, and extensive soft tissue emphysema. NG tube was placed. Patient states she is feeling well, +BM, +flatus. Denies nausea/vomiting, chest pain or SOB. Exam is soft, well-healing wounds, mildly distended, no rebound/guarding. Recommend replacing electrolytes PRN, hold antimotility medications, monitor bowel function, monitor I/O. Removed NG tube this morning, informing patient that she may need it replaced if she starts feeling ill again. Moistening mouth with ice chips and sips of water for now, consider advancing to CLD this afternoon if still feeling well. Consider repeat CT A/P with PO and IV contrast if clinically worsens. Patient is refusing repeat imaging at this time since she is feeling better.

## 2019-09-25 NOTE — CHART NOTE - NSCHARTNOTEFT_GEN_A_CORE
Patient seen at bedside secondary to potassium of 3.0, ad labs unable to be drawn by lab.  S/P NG Tube removal at 0800 by Surgical team. Patient instructed Ice chips okay at this time.  Denies CP, palpitations, SOB, N/V, LE pain/ swelling, diarrhea, pain/difficulty with urination.     PE: CV: S1+S2 RRR  Pulm: CTAB  Abd: Soft, nontender, nondistended  Ext: No edema in upper or lower extremity. Right hand noted to be dusky in color compared to left, radial pulses present +2, and denies pain or difficulty with movement. Likely secondary hold ice cup for prolonged period of time.

## 2019-09-25 NOTE — PROGRESS NOTE ADULT - SUBJECTIVE AND OBJECTIVE BOX
GENERAL SURGERY PROGRESS NOTE     MARCELLUS WILSON  49y  Female  Hospital day :8d  POD:  Procedure: Lysis, adhesions, pelvis, laparoscopic  Cystoscopy, diagnostic  Laparoscopic total hysterectomy with salpingo-oophorectomy for uterus greater than 250 grams    OVERNIGHT EVENTS:  Tmax 100.4F overnight, received IV tylenol.     T(F): 98.7 (19 @ 00:00), Max: 100.4 (19 @ 15:45)  HR: 91 (19 @ 00:00) (91 - 127)  BP: 97/59 (19 @ 00:00) (97/59 - 133/81)  ABP: --  ABP(mean): --  RR: 20 (19 @ 00:00) (19 - 20)  SpO2: --    DIET/FLUIDS: dextrose 5% + sodium chloride 0.9% with potassium chloride 20 mEq/L 1000 milliLiter(s) IV Continuous <Continuous>    N19 @ 07:01  -  19 @ 07:00  --------------------------------------------------------  OUT: 1425 mL     GI proph:  pantoprazole  Injectable 40 milliGRAM(s) IV Push daily    ABx: levoFLOXacin IVPB 750 milliGRAM(s) IV Intermittent every 24 hours  levoFLOXacin IVPB      metroNIDAZOLE  IVPB 500 milliGRAM(s) IV Intermittent every 8 hours  metroNIDAZOLE  IVPB        PHYSICAL EXAM:  GENERAL: NAD, well-appearing, NGT in place  CHEST/LUNG: Clear to auscultation bilaterally  HEART: Regular rate and rhythm  ABDOMEN: Soft, Nontender, Nondistended;   EXTREMITIES:  No clubbing, cyanosis, or edema      LABS  Labs:  CAPILLARY BLOOD GLUCOSE                        8.5    10.78 )-----------( 349      ( 24 Sep 2019 01:57 )             27.7       -    147<H>  |  102  |  29<H>  ----------------------------<  117<H>  3.0<L>   |  33<H>  |  0.8     Lactate, Blood: 1.2 mmol/L (19 @ 14:30)  Blood Gas Arterial, Lactate: 1.1 mmoL/L (19 @ 02:00)  Lactate, Blood: 1.3 mmol/L (19 @ 00:55)  Blood Gas Arterial, Lactate: 1.2 mmoL/L (19 @ 13:31)    ABG - ( 23 Sep 2019 02:00 )  pH: 7.55  /  pCO2: 44    /  pO2: 89    / HCO3: 38    / Base Excess: 14.0  /  SaO2: 97        ABG - ( 22 Sep 2019 13:31 )  pH: 7.55  /  pCO2: 40    /  pO2: 70    / HCO3: 35    / Base Excess: 11.5  /  SaO2: 95        Culture - Urine (collected 23 Sep 2019 06:37)  Source: .Urine Clean Catch (Midstream)  Preliminary Report (24 Sep 2019 17:10):    50,000 - 99,000 CFU/mL Gram Negative Rods    Culture - Blood (collected 23 Sep 2019 01:00)  Source: .Blood Blood-Venous  Preliminary Report (24 Sep 2019 07:01):    No growth to date.    RADIOLOGY & ADDITIONAL TESTS:  < from: Xray Abdomen 3 Position w/Chest (19 @ 15:24) >  COMPARISON: CT of the abdomen and pelvis on 2019. Chest radiograph   from one day earlier.    VIEWS: Three plain radiographs of the chest and the abdomen in the   upright and supine positions were submitted.    FINDINGS:    CHEST:    Minimal atelectasis at the lung bases with blunted costophrenic angles   suggestive oftrace pleural effusions. The cardiac silhouette is stable.    ABDOMEN:    Enteric tube is seen in the left upper quadrant unchanged in position. No   free air. A few dilated loops of bowel are seen in the central abdomen   with air-fluid levels on the erect view. No significant gas in the right.    Right lateral abdominal wall subcutaneous emphysema, postsurgical in   nature.    Right gamma now noted.    IMPRESSION:    Enteric tube in place.    A few dilated loops of bowel are seen in the central abdomen with   air-fluid levels on the erect view. Findings could still represent a   bowel obstruction versus ileus. No significant gas in the rectum.  < end of copied text >

## 2019-09-25 NOTE — PROGRESS NOTE ADULT - ATTENDING COMMENTS
postop day 8  *Ileus: NGT removed by surgery team in the am. NPO except for ice chips. Pt was seen drinking sugary drink but she denies. explained to patient the importance with compliance and to take diet slow to avoid having to replace ngt. ambulating. having bowel movements and passing flatus. denies nausea or vomiting  *Fever: CT scan showed hematoma with infection vs hemostatic agent (evicel was used). Given fevers, IV antibiotics were started for posthysterectomy abscess although abdomen not tender, no vaginal discharge, normal wbc. ID consulted and they recommended switch antibiotic regimen. afebrile >24 hours. will transition to PO once she is tolerating diet  explained to patient everything regarding her postop course, all questions answered

## 2019-09-26 LAB
ANION GAP SERPL CALC-SCNC: 12 MMOL/L — SIGNIFICANT CHANGE UP (ref 7–14)
ANISOCYTOSIS BLD QL: SLIGHT — SIGNIFICANT CHANGE UP
BASOPHILS # BLD AUTO: 0.14 K/UL — SIGNIFICANT CHANGE UP (ref 0–0.2)
BASOPHILS NFR BLD AUTO: 0.9 % — SIGNIFICANT CHANGE UP (ref 0–1)
BUN SERPL-MCNC: 13 MG/DL — SIGNIFICANT CHANGE UP (ref 10–20)
CALCIUM SERPL-MCNC: 7.6 MG/DL — LOW (ref 8.5–10.1)
CHLORIDE SERPL-SCNC: 110 MMOL/L — SIGNIFICANT CHANGE UP (ref 98–110)
CO2 SERPL-SCNC: 24 MMOL/L — SIGNIFICANT CHANGE UP (ref 17–32)
CREAT SERPL-MCNC: 0.7 MG/DL — SIGNIFICANT CHANGE UP (ref 0.7–1.5)
EOSINOPHIL # BLD AUTO: 0 K/UL — SIGNIFICANT CHANGE UP (ref 0–0.7)
EOSINOPHIL NFR BLD AUTO: 0 % — SIGNIFICANT CHANGE UP (ref 0–8)
GIANT PLATELETS BLD QL SMEAR: PRESENT — SIGNIFICANT CHANGE UP
GLUCOSE SERPL-MCNC: 136 MG/DL — HIGH (ref 70–99)
HCT VFR BLD CALC: 29.2 % — LOW (ref 37–47)
HGB BLD-MCNC: 8.8 G/DL — LOW (ref 12–16)
HYPOCHROMIA BLD QL: SLIGHT — SIGNIFICANT CHANGE UP
LYMPHOCYTES # BLD AUTO: 1.03 K/UL — LOW (ref 1.2–3.4)
LYMPHOCYTES # BLD AUTO: 6.5 % — LOW (ref 20.5–51.1)
MAGNESIUM SERPL-MCNC: 2.2 MG/DL — SIGNIFICANT CHANGE UP (ref 1.8–2.4)
MANUAL SMEAR VERIFICATION: SIGNIFICANT CHANGE UP
MCHC RBC-ENTMCNC: 24.8 PG — LOW (ref 27–31)
MCHC RBC-ENTMCNC: 30.1 G/DL — LOW (ref 32–37)
MCV RBC AUTO: 82.3 FL — SIGNIFICANT CHANGE UP (ref 81–99)
MONOCYTES # BLD AUTO: 0.44 K/UL — SIGNIFICANT CHANGE UP (ref 0.1–0.6)
MONOCYTES NFR BLD AUTO: 2.8 % — SIGNIFICANT CHANGE UP (ref 1.7–9.3)
MYELOCYTES NFR BLD: 1.8 % — HIGH (ref 0–0)
NEUTROPHILS # BLD AUTO: 13.52 K/UL — HIGH (ref 1.4–6.5)
NEUTROPHILS NFR BLD AUTO: 85.2 % — HIGH (ref 42.2–75.2)
NRBC # BLD: 2 /100 — HIGH (ref 0–0)
NRBC # BLD: SIGNIFICANT CHANGE UP /100 WBCS (ref 0–0)
PHOSPHATE SERPL-MCNC: 2 MG/DL — LOW (ref 2.1–4.9)
PLAT MORPH BLD: NORMAL — SIGNIFICANT CHANGE UP
PLATELET # BLD AUTO: 457 K/UL — HIGH (ref 130–400)
POIKILOCYTOSIS BLD QL AUTO: SLIGHT — SIGNIFICANT CHANGE UP
POLYCHROMASIA BLD QL SMEAR: SLIGHT — SIGNIFICANT CHANGE UP
POTASSIUM SERPL-MCNC: 3.5 MMOL/L — SIGNIFICANT CHANGE UP (ref 3.5–5)
POTASSIUM SERPL-SCNC: 3.5 MMOL/L — SIGNIFICANT CHANGE UP (ref 3.5–5)
RBC # BLD: 3.55 M/UL — LOW (ref 4.2–5.4)
RBC # FLD: 17.6 % — HIGH (ref 11.5–14.5)
RBC BLD AUTO: NORMAL — SIGNIFICANT CHANGE UP
SODIUM SERPL-SCNC: 146 MMOL/L — SIGNIFICANT CHANGE UP (ref 135–146)
VARIANT LYMPHS # BLD: 2.8 % — SIGNIFICANT CHANGE UP (ref 0–5)
WBC # BLD: 15.87 K/UL — HIGH (ref 4.8–10.8)
WBC # FLD AUTO: 15.87 K/UL — HIGH (ref 4.8–10.8)

## 2019-09-26 PROCEDURE — 99232 SBSQ HOSP IP/OBS MODERATE 35: CPT

## 2019-09-26 PROCEDURE — 99024 POSTOP FOLLOW-UP VISIT: CPT

## 2019-09-26 RX ORDER — ACETAMINOPHEN 500 MG
650 TABLET ORAL EVERY 6 HOURS
Refills: 0 | Status: DISCONTINUED | OUTPATIENT
Start: 2019-09-26 | End: 2019-10-01

## 2019-09-26 RX ORDER — IBUPROFEN 200 MG
600 TABLET ORAL EVERY 6 HOURS
Refills: 0 | Status: DISCONTINUED | OUTPATIENT
Start: 2019-09-26 | End: 2019-10-01

## 2019-09-26 RX ORDER — IOHEXOL 300 MG/ML
30 INJECTION, SOLUTION INTRAVENOUS ONCE
Refills: 0 | Status: DISCONTINUED | OUTPATIENT
Start: 2019-09-26 | End: 2019-09-26

## 2019-09-26 RX ADMIN — PANTOPRAZOLE SODIUM 40 MILLIGRAM(S): 20 TABLET, DELAYED RELEASE ORAL at 11:47

## 2019-09-26 RX ADMIN — Medication 100 MILLIGRAM(S): at 21:00

## 2019-09-26 RX ADMIN — Medication 100 MILLIGRAM(S): at 05:15

## 2019-09-26 RX ADMIN — ENOXAPARIN SODIUM 40 MILLIGRAM(S): 100 INJECTION SUBCUTANEOUS at 11:47

## 2019-09-26 RX ADMIN — DEXTROSE MONOHYDRATE, SODIUM CHLORIDE, AND POTASSIUM CHLORIDE 125 MILLILITER(S): 50; .745; 4.5 INJECTION, SOLUTION INTRAVENOUS at 05:12

## 2019-09-26 RX ADMIN — Medication 100 MILLIGRAM(S): at 13:37

## 2019-09-26 NOTE — CHART NOTE - NSCHARTNOTEFT_GEN_A_CORE
Registered Dietitian Follow-Up     Patient Profile Reviewed                           Yes [x]   No []     Nutrition History Previously Obtained        Yes [x]  No []       Pertinent Medical Interventions: NGT removed. +flatus, BM. Ambulating and voiding. Tolerating CLD. Continues to have low grade temperature. Due to low grade fever over past day and increased WBC, would recommend CT w/ PO and IV contrast (discussed with pt who is agreeable).    Diet order: Clears--pt tolerating well at this time, consuming all of her meal trays w/o any issues.      Anthropometrics:  - Ht. 65"  - Wt. no new wts   - %wt change  - BMI: 25.0  - IBW: 125#      Pertinent Lab Data: Reviewed (9/26): H/H 8.8/29.2, Glu 136     Pertinent Meds: D5NS+KCl, protonix, zofran     Physical Findings:  - Appearance: alert and oriented; no edema noted   - GI function: abdominal discomfort; LBM 9/24   - Tubes: none   - Oral/Mouth cavity: denies difficulty swallowing/chewing  - Skin: surgical incision      Nutrition Requirements  Weight Used: CBW: 150#/68kg; needs continued from RD note on 9/23      Calories: 2261-0833 kcal/day (MSJ x 1.2-1.3 AF)  Protein: 68-82 gm/day (1-1.2 gm/kg CBW)  Fluid: 1 ml/kcal    Nutrient Intake: not meeting kcal/pro needs at this time d/t nutritionally limited diet order      Previous Nutrition Diagnosis: Inadequate protein-energy intake (ongoing)      Nutrition Intervention: meals and snacks, medical food supplements    Recommendations:  1. Monitor Surgery/GYN POC and advance diet per their recs when medically feasible + vegetarian (fish allowed) diet modifier  2. Order Ensure Clear + Prosource Gelatein both once daily      Goal/Expected Outcome: Pt to consume >50% of meals, snacks, and supplements within 4 days      Indicator/Monitoring: RD to monitor diet order, energy intake, electrolyte/glucose profiles, nutrition focused physical findings

## 2019-09-26 NOTE — PROGRESS NOTE ADULT - SUBJECTIVE AND OBJECTIVE BOX
PGY 4 Note    Patient examined at bedside, no overnight events.  Pain well controlled.  Denies nausea/vomiting.  Tolerating clear liquid diet.  Reports continued flatus and BMs. Denies fevers/chills, HA/N/V, CP/SOB/palpitations, abdominal pain  Reports minimal vaginal spotting, pink, denies gross vagianl bleeding.  Ambulating. Using incentive spirometry.     T(F): 99.1 (09-26-19 @ 04:45), Max: 100.1 (09-25-19 @ 07:30)  HR: 92 (09-26-19 @ 04:45) (92 - 105)  BP: 144/77 (09-26-19 @ 04:45) (135/81 - 149/65)  RR: 18 (09-26-19 @ 04:45) (18 - 18)  SpO2: --    I&O's Summary    24 Sep 2019 07:01  -  25 Sep 2019 07:00  --------------------------------------------------------  IN: 2375 mL / OUT: 1005 mL / NET: 1370 mL    25 Sep 2019 07:01  -  26 Sep 2019 06:14  --------------------------------------------------------  IN: 800 mL / OUT: 0 mL / NET: 800 mL    Physical Exam:  General:  NAD  CVS: RRR. Nl S1S2  Lungs: CTAB  Abdomen: soft, non-tender, non-distended, +BSx4  Incision: C/D/I, no erythema, no draining  VE: deferred, no bleeding on pad/chux  Ext: No edema. No calf tenderness.    Labs:             8.4<L>  16.44<H> )-----------( 401<H>    ( 09-25 @ 08:41 )             27.8<L>               8.5<L>  10.78 )-----------( 349      ( 09-24 @ 01:57 )             27.7<L>               9.2<L>  11.52<H> )-----------( 427<H>    ( 09-23 @ 14:30 )             29.4<L>               10.0<L>  8.86  )-----------( 451<H>    ( 09-23 @ 00:55 )             31.7<L>               9.6<L>  4.38<L> )-----------( 358      ( 09-21 @ 06:25 )             31.2<L>     09-25    150<H>  |  111<H>  |  17  ----------------------------<  146<H>  3.6   |  31  |  0.7    Ca    7.6<L>      25 Sep 2019 08:41  Phos  1.6     09-25  Mg     2.5     09-25        Culture - Urine (collected 23 Sep 2019 06:37)  Source: .Urine Clean Catch (Midstream)  Final Report (25 Sep 2019 18:32):    50,000 - 99,000 CFU/mL Escherichia coli    10,000 - 49,000 CFU/mL Klebsiella pneumoniae  Organism: Escherichia coli  Klebsiella pneumoniae (25 Sep 2019 18:32)  Organism: Klebsiella pneumoniae (25 Sep 2019 18:32)  Organism: Escherichia coli (25 Sep 2019 18:32)            Trend:             8.4<L>  16.44<H> )-----------( 401<H>    ( 09-25 @ 08:41 )             27.8<L>               8.5<L>  10.78 )-----------( 349      ( 09-24 @ 01:57 )             27.7<L>               9.2<L>  11.52<H> )-----------( 427<H>    ( 09-23 @ 14:30 )             29.4<L>               10.0<L>  8.86  )-----------( 451<H>    ( 09-23 @ 00:55 )             31.7<L>               9.6<L>  4.38<L> )-----------( 358      ( 09-21 @ 06:25 )             31.2<L>        Creatinine, Serum: 0.7 (09-25)  Creatinine, Serum: 0.8 (09-24)  Creatinine, Serum: 0.9 (09-23)  Creatinine, Serum: 1.1 (09-23)  Creatinine, Serum: 0.8 (09-21)  Creatinine, Serum: 0.7 (09-20)  Creatinine, Serum: 0.7 (09-19)  Creatinine, Serum: 0.9 (09-17)      Medications:  MEDICATIONS  (STANDING):  benzocaine 20% Spray 1 Spray(s) Topical every 4 hours  dextrose 5% + sodium chloride 0.9% with potassium chloride 20 mEq/L 1000 milliLiter(s) (125 mL/Hr) IV Continuous <Continuous>  enoxaparin Injectable 40 milliGRAM(s) SubCutaneous daily  levoFLOXacin IVPB 750 milliGRAM(s) IV Intermittent every 24 hours  levoFLOXacin IVPB      metroNIDAZOLE  IVPB 500 milliGRAM(s) IV Intermittent every 8 hours  metroNIDAZOLE  IVPB      pantoprazole  Injectable 40 milliGRAM(s) IV Push daily    MEDICATIONS  (PRN):  ondansetron Injectable 8 milliGRAM(s) IV Push every 8 hours PRN Nausea and/or Vomiting PGY 4 Note    Patient examined at bedside, no overnight events.  Pain well controlled.  Denies nausea/vomiting.  Tolerating clear liquid diet.  Reports continued flatus and BMs. Denies fevers/chills, HA/N/V, CP/SOB/palpitations, abdominal pain  Reports minimal vaginal spotting, pink, denies gross vagianl bleeding.  Ambulating. Using incentive spirometry.     T(F): 99.1 (09-26-19 @ 04:45), Max: 100.1 (09-25-19 @ 07:30)  HR: 92 (09-26-19 @ 04:45) (92 - 105)  BP: 144/77 (09-26-19 @ 04:45) (135/81 - 149/65)  RR: 18 (09-26-19 @ 04:45) (18 - 18)  SpO2: --    I&O's Summary    24 Sep 2019 07:01  -  25 Sep 2019 07:00  --------------------------------------------------------  IN: 2375 mL / OUT: 1005 mL / NET: 1370 mL    25 Sep 2019 07:01  -  26 Sep 2019 06:14  --------------------------------------------------------  IN: 800 mL / OUT: 0 mL / NET: 800 mL    Physical Exam:  General:  NAD  CVS: RRR. Nl S1S2  Lungs: CTAB  Abdomen: soft, non-tender, non-distended, BS hypoactive  Incision: C/D/I, no erythema, no draining  VE: deferred, no bleeding on pad/chux  Ext: No edema. No calf tenderness.    Labs:             8.4<L>  16.44<H> )-----------( 401<H>    ( 09-25 @ 08:41 )             27.8<L>               8.5<L>  10.78 )-----------( 349      ( 09-24 @ 01:57 )             27.7<L>               9.2<L>  11.52<H> )-----------( 427<H>    ( 09-23 @ 14:30 )             29.4<L>               10.0<L>  8.86  )-----------( 451<H>    ( 09-23 @ 00:55 )             31.7<L>               9.6<L>  4.38<L> )-----------( 358      ( 09-21 @ 06:25 )             31.2<L>     09-25    150<H>  |  111<H>  |  17  ----------------------------<  146<H>  3.6   |  31  |  0.7    Ca    7.6<L>      25 Sep 2019 08:41  Phos  1.6     09-25  Mg     2.5     09-25        Culture - Urine (collected 23 Sep 2019 06:37)  Source: .Urine Clean Catch (Midstream)  Final Report (25 Sep 2019 18:32):    50,000 - 99,000 CFU/mL Escherichia coli    10,000 - 49,000 CFU/mL Klebsiella pneumoniae  Organism: Escherichia coli  Klebsiella pneumoniae (25 Sep 2019 18:32)  Organism: Klebsiella pneumoniae (25 Sep 2019 18:32)  Organism: Escherichia coli (25 Sep 2019 18:32)            Trend:             8.4<L>  16.44<H> )-----------( 401<H>    ( 09-25 @ 08:41 )             27.8<L>               8.5<L>  10.78 )-----------( 349      ( 09-24 @ 01:57 )             27.7<L>               9.2<L>  11.52<H> )-----------( 427<H>    ( 09-23 @ 14:30 )             29.4<L>               10.0<L>  8.86  )-----------( 451<H>    ( 09-23 @ 00:55 )             31.7<L>               9.6<L>  4.38<L> )-----------( 358      ( 09-21 @ 06:25 )             31.2<L>        Creatinine, Serum: 0.7 (09-25)  Creatinine, Serum: 0.8 (09-24)  Creatinine, Serum: 0.9 (09-23)  Creatinine, Serum: 1.1 (09-23)  Creatinine, Serum: 0.8 (09-21)  Creatinine, Serum: 0.7 (09-20)  Creatinine, Serum: 0.7 (09-19)  Creatinine, Serum: 0.9 (09-17)      Medications:  MEDICATIONS  (STANDING):  benzocaine 20% Spray 1 Spray(s) Topical every 4 hours  dextrose 5% + sodium chloride 0.9% with potassium chloride 20 mEq/L 1000 milliLiter(s) (125 mL/Hr) IV Continuous <Continuous>  enoxaparin Injectable 40 milliGRAM(s) SubCutaneous daily  levoFLOXacin IVPB 750 milliGRAM(s) IV Intermittent every 24 hours  levoFLOXacin IVPB      metroNIDAZOLE  IVPB 500 milliGRAM(s) IV Intermittent every 8 hours  metroNIDAZOLE  IVPB      pantoprazole  Injectable 40 milliGRAM(s) IV Push daily    MEDICATIONS  (PRN):  ondansetron Injectable 8 milliGRAM(s) IV Push every 8 hours PRN Nausea and/or Vomiting

## 2019-09-26 NOTE — PROGRESS NOTE ADULT - ATTENDING COMMENTS
improving, afebrile, midline placed yesterday due to loss of IV access and difficult stick. tolerating clear liquid diet, pain minimal. consider advancing to bland regular diet later today. on IV antibiotics, WBC increased to 16 yesterday, will trend labs today. ambulating.

## 2019-09-26 NOTE — PROGRESS NOTE ADULT - ASSESSMENT
· Assessment		  48yo P1 h/o UC with total colectomy, h/o DCIS s/p lumpectomy with radiation, h/o unprovoked PE found to have prothrombin mutation (heterozygous), with fibroid uterus and pelvic pain s/p RATLH, BSO, cystoscopy, EBL 250cc, complicated by postoperative ileus, intermittent fevers.    IMPRESSION:  Presently asymptomatic  PE unremarkable  no obvious infectious focus  No phlebitis  Clinically no PNA  NO intraabdominal abscess  No SSTI at surgical site  WBC 15.8  BCx 9/23 NG  UCx E coli, Klebsiella 9 colonizers )    RECOMMENDATIONS:  Po Levoquin 750 mg q24h  Po Flagyl 500 mg q8h  Duration 7 days  Recall prn please

## 2019-09-26 NOTE — PROGRESS NOTE ADULT - ATTENDING COMMENTS
48yo female with PMHx/PSHx of total colectomy with primary anastomosis for UC s/p robotic total hysterectomy with salphino-oophorectomy, extensive NEMO 9/17 now with ileus versus obstruction. CT A/P from 9/20 demonstrates diffuse small bowel dilatation without transition point, large hyperattenuating right lower quadrant intraperitoneal structure with gas - hematoma versus hemostatic material, and extensive soft tissue emphysema. Removed NG tube yesterday morning, informing patient that she may need it replaced if she starts feeling ill again. Patient states she is feeling well, +BM, +flatus. Denies nausea/vomiting, chest pain or SOB. Tolerating CLD. Low grade fevers. Exam is soft, well-healing wounds, mildly distended, no rebound/guarding. Labs significant for leukocytosis 10 --> 16 --> 15. Recommend repeat CT A/P with PO and IV contrast, patient initially refusing but discussed necessity for scan and she appeared agreeable this morning. Recommend replacing electrolytes PRN, hold antimotility medications, monitor bowel function, monitor I/O.

## 2019-09-26 NOTE — PROGRESS NOTE ADULT - ASSESSMENT
48yo P1 h/o UC with total colectomy, h/o DCIS s/p lumpectomy with radiation, h/o unprovoked PE found to have prothrombin mutation (heterozygous), with fibroid uterus and pelvic pain s/p RATLH, BSO, cystoscopy, EBL 250cc, complicated by postoperative ileus, intermittent fevers,  POD #9  GI: NG d/c yesterday AM, tolerating CLD  DVT prophylaxis: lovenox, ambulation  Neuro: pain control  Cardio: no dx  Pulm: incentive spirometer and ambulation  ID: levaquin and flagyl per ID, BCx NGTD f/u final, UCx e. coli and klebsiella senesitive to current abx regimen, discuss transition to PO abx  Endo: hypokalemia secondary to NG resolved yesterday, f/u K+ this AM and replete prn, c/w maintenance fluids until tolerating regular diet  : adequate urine output, Cr back to baseline, no vaginal bleeding or foul smelling discharge  Heme: H/H stable, VSS    Plan: advance diet as tolerated, transition to PO abx, possible d/c tomorrow if continues to clinically improve    Will inform Dr Wayne 48yo P1 h/o UC with total colectomy, h/o DCIS s/p lumpectomy with radiation, h/o unprovoked PE found to have prothrombin mutation (heterozygous), with fibroid uterus and pelvic pain s/p RATLH, BSO, cystoscopy, EBL 250cc, complicated by postoperative ileus, intermittent fevers,  POD #9  GI: NG d/c yesterday AM, tolerating CLD  DVT prophylaxis: lovenox, ambulation  Neuro: pain control  Cardio: no dx  Pulm: incentive spirometer and ambulation  ID: levaquin and flagyl per ID, BCx NGTD f/u final, UCx e. coli and klebsiella sensitive to current abx regimen, discuss transition to PO abx  Endo: hypokalemia resolved yesterday, f/u K+ this AM and replete prn, c/w maintenance fluids until tolerating regular diet  : adequate urine output, Cr back to baseline, no vaginal bleeding or foul smelling discharge  Heme: H/H stable, VSS    Plan: advance diet as tolerated, transition to PO abx, possible d/c tomorrow if continues to clinically improve    Will inform Dr Wayne

## 2019-09-26 NOTE — PROGRESS NOTE ADULT - SUBJECTIVE AND OBJECTIVE BOX
MARCELLUS WILSON  49y, Female      OVERNIGHT EVENTS:    NG tube is out  no fevers  no  complaints  no abdominal pain    VITALS:  T(F): 99.7, Max: 99.7 (09-26-19 @ 08:03)  HR: 94  BP: 133/79  RR: 18Vital Signs Last 24 Hrs  T(C): 37.6 (26 Sep 2019 08:03), Max: 37.6 (26 Sep 2019 00:02)  T(F): 99.7 (26 Sep 2019 08:03), Max: 99.7 (26 Sep 2019 08:03)  HR: 94 (26 Sep 2019 08:03) (92 - 96)  BP: 133/79 (26 Sep 2019 08:03) (133/79 - 149/65)  BP(mean): --  RR: 18 (26 Sep 2019 08:03) (18 - 18)  SpO2: --    TESTS & MEASUREMENTS:                        8.8    15.87 )-----------( 457      ( 26 Sep 2019 09:46 )             29.2     09-26    146  |  110  |  13  ----------------------------<  136<H>  3.5   |  24  |  0.7    Ca    7.6<L>      26 Sep 2019 09:46  Phos  2.0     09-26  Mg     2.2     09-26          Culture - Urine (collected 09-23-19 @ 06:37)  Source: .Urine Clean Catch (Midstream)  Final Report (09-25-19 @ 18:32):    50,000 - 99,000 CFU/mL Escherichia coli    10,000 - 49,000 CFU/mL Klebsiella pneumoniae  Organism: Escherichia coli  Klebsiella pneumoniae (09-25-19 @ 18:32)  Organism: Klebsiella pneumoniae (09-25-19 @ 18:32)      -  Amikacin: S <=16      -  Ampicillin: R >16 These ampicillin results predict results for amoxicillin      -  Ampicillin/Sulbactam: I 16/8 Enterobacter, Citrobacter, and Serratia may develop resistance during prolonged therapy (3-4 days)      -  Aztreonam: S <=4      -  Cefazolin: S <=8 ([id number]_[email or web address]) For uncomplicated UTI with K. pneumoniae, E. coli, or P. mirablis: SNEHA <=16 is sensitive and SNEHA >=32 is resistant. This also predicts results for oral agents cefaclor, cefdinir, cefpodoxime, cefprozil, cefuroxime axetil, cephalexin and locarbef for uncomplicated UTI. Note that some isolates may be susceptible to these agents while testing resistant to cefazolin.      -  Cefepime: S <=4      -  Cefoxitin: S <=8      -  Ceftriaxone: S <=1 Enterobacter, Citrobacter, and Serratia may develop resistance during prolonged therapy      -  Ciprofloxacin: S <=1      -  Gentamicin: S <=4      -  Imipenem: S <=1      -  Levofloxacin: S <=2      -  Meropenem: S <=1      -  Nitrofurantoin: S <=32 Should not be used to treat pyelonephritis      -  Piperacillin/Tazobactam: S <=16      -  Tigecycline: S <=2      -  Tobramycin: S <=4      -  Trimethoprim/Sulfamethoxazole: S <=2/38      Method Type: SNEHA  Organism: Escherichia coli (09-25-19 @ 18:32)      -  Amikacin: S <=16      -  Ampicillin: S <=8 These ampicillin results predict results for amoxicillin      -  Ampicillin/Sulbactam: S <=8/4 Enterobacter, Citrobacter, and Serratia may develop resistance during prolonged therapy (3-4 days)      -  Aztreonam: S <=4      -  Cefazolin: S <=8 (MIC_CL_COM_[email or web address]) For uncomplicated UTI with K. pneumoniae, E. coli, or P. mirablis: SNEHA <=16 is sensitive and SNEHA >=32 is resistant. This also predicts results for oral agents cefaclor, cefdinir, cefpodoxime, cefprozil, cefuroxime axetil, cephalexin and locarbef for uncomplicated UTI. Note that some isolates may be susceptible to these agents while testing resistant to cefazolin.      -  Cefepime: S <=4      -  Cefoxitin: S <=8      -  Ceftriaxone: S <=1 Enterobacter, Citrobacter, and Serratia may develop resistance during prolonged therapy      -  Ciprofloxacin: S <=1      -  Gentamicin: S <=4      -  Imipenem: S <=1      -  Levofloxacin: S <=2      -  Meropenem: S <=1      -  Nitrofurantoin: S <=32 Should not be used to treat pyelonephritis      -  Piperacillin/Tazobactam: S <=16      -  Tigecycline: S <=2      -  Tobramycin: S <=4      -  Trimethoprim/Sulfamethoxazole: S <=2/38      Method Type: SNEHA    Culture - Blood (collected 09-23-19 @ 01:00)  Source: .Blood Blood-Venous  Preliminary Report (09-24-19 @ 07:01):    No growth to date.            RADIOLOGY & ADDITIONAL TESTS:    ANTIBIOTICS:  levoFLOXacin IVPB 750 milliGRAM(s) IV Intermittent every 24 hours  levoFLOXacin IVPB      metroNIDAZOLE  IVPB 500 milliGRAM(s) IV Intermittent every 8 hours  metroNIDAZOLE  IVPB

## 2019-09-26 NOTE — PROGRESS NOTE ADULT - SUBJECTIVE AND OBJECTIVE BOX
PGY4 Note    Surgery recommended CT ab/pel with PO and IV contrast. Patient originally receptive and then she refused to drink the PO or take the IV contrast. Discussed with the patient that given the recent start of cipro/flagyl, and the slight decrease of WBC from 16-15, can consider watching for now, repeat the CBC in AM, and then if still elevated can then do CT or if decreasing can hold off for now. Still on clear liquid diet per surgery, will add gelatin and ensure per dietary when diet is advanced. Cont IV fluids and f/u AM labs.    Dr. Wayne aware

## 2019-09-26 NOTE — PROGRESS NOTE ADULT - ASSESSMENT
49F w/ PMH of breast cancer, PE, and total colectomy for UC s/p laparoscopic total hysterectomy with salpingo-oophorectomy on 9/17 with ileus vs. obstruction.     Plan:   - due to low grade fever over past day and increased WBC, would recommend CT w/ PO and IV contrast (discussed with pt who is agreeable)  - continue CLD, would not recommend advancing  - monitor bowel function  - trend WBC: 10.78 > 16.44 > 15.87  - continue IV antibiotics per ID

## 2019-09-27 LAB
ANION GAP SERPL CALC-SCNC: 10 MMOL/L — SIGNIFICANT CHANGE UP (ref 7–14)
BASOPHILS # BLD AUTO: 0.04 K/UL — SIGNIFICANT CHANGE UP (ref 0–0.2)
BASOPHILS NFR BLD AUTO: 0.2 % — SIGNIFICANT CHANGE UP (ref 0–1)
BUN SERPL-MCNC: 11 MG/DL — SIGNIFICANT CHANGE UP (ref 10–20)
CALCIUM SERPL-MCNC: 7.5 MG/DL — LOW (ref 8.5–10.1)
CHLORIDE SERPL-SCNC: 106 MMOL/L — SIGNIFICANT CHANGE UP (ref 98–110)
CO2 SERPL-SCNC: 25 MMOL/L — SIGNIFICANT CHANGE UP (ref 17–32)
CREAT SERPL-MCNC: 0.7 MG/DL — SIGNIFICANT CHANGE UP (ref 0.7–1.5)
EOSINOPHIL # BLD AUTO: 0.09 K/UL — SIGNIFICANT CHANGE UP (ref 0–0.7)
EOSINOPHIL NFR BLD AUTO: 0.5 % — SIGNIFICANT CHANGE UP (ref 0–8)
GLUCOSE SERPL-MCNC: 136 MG/DL — HIGH (ref 70–99)
HCT VFR BLD CALC: 27.2 % — LOW (ref 37–47)
HGB BLD-MCNC: 8.3 G/DL — LOW (ref 12–16)
IMM GRANULOCYTES NFR BLD AUTO: 12.3 % — HIGH (ref 0.1–0.3)
LYMPHOCYTES # BLD AUTO: 1.4 K/UL — SIGNIFICANT CHANGE UP (ref 1.2–3.4)
LYMPHOCYTES # BLD AUTO: 8.5 % — LOW (ref 20.5–51.1)
MAGNESIUM SERPL-MCNC: 1.9 MG/DL — SIGNIFICANT CHANGE UP (ref 1.8–2.4)
MCHC RBC-ENTMCNC: 24.8 PG — LOW (ref 27–31)
MCHC RBC-ENTMCNC: 30.5 G/DL — LOW (ref 32–37)
MCV RBC AUTO: 81.2 FL — SIGNIFICANT CHANGE UP (ref 81–99)
MONOCYTES # BLD AUTO: 1.1 K/UL — HIGH (ref 0.1–0.6)
MONOCYTES NFR BLD AUTO: 6.7 % — SIGNIFICANT CHANGE UP (ref 1.7–9.3)
NEUTROPHILS # BLD AUTO: 11.8 K/UL — HIGH (ref 1.4–6.5)
NEUTROPHILS NFR BLD AUTO: 71.8 % — SIGNIFICANT CHANGE UP (ref 42.2–75.2)
NRBC # BLD: 0 /100 WBCS — SIGNIFICANT CHANGE UP (ref 0–0)
PHOSPHATE SERPL-MCNC: 2.6 MG/DL — SIGNIFICANT CHANGE UP (ref 2.1–4.9)
PLATELET # BLD AUTO: 442 K/UL — HIGH (ref 130–400)
POTASSIUM SERPL-MCNC: 3.8 MMOL/L — SIGNIFICANT CHANGE UP (ref 3.5–5)
POTASSIUM SERPL-SCNC: 3.8 MMOL/L — SIGNIFICANT CHANGE UP (ref 3.5–5)
RBC # BLD: 3.35 M/UL — LOW (ref 4.2–5.4)
RBC # FLD: 17.8 % — HIGH (ref 11.5–14.5)
SODIUM SERPL-SCNC: 141 MMOL/L — SIGNIFICANT CHANGE UP (ref 135–146)
WBC # BLD: 16.46 K/UL — HIGH (ref 4.8–10.8)
WBC # FLD AUTO: 16.46 K/UL — HIGH (ref 4.8–10.8)

## 2019-09-27 PROCEDURE — 99024 POSTOP FOLLOW-UP VISIT: CPT

## 2019-09-27 PROCEDURE — 99232 SBSQ HOSP IP/OBS MODERATE 35: CPT

## 2019-09-27 RX ORDER — IOHEXOL 300 MG/ML
30 INJECTION, SOLUTION INTRAVENOUS ONCE
Refills: 0 | Status: COMPLETED | OUTPATIENT
Start: 2019-09-27 | End: 2019-09-27

## 2019-09-27 RX ADMIN — Medication 650 MILLIGRAM(S): at 16:34

## 2019-09-27 RX ADMIN — ENOXAPARIN SODIUM 40 MILLIGRAM(S): 100 INJECTION SUBCUTANEOUS at 11:31

## 2019-09-27 RX ADMIN — Medication 100 MILLIGRAM(S): at 14:24

## 2019-09-27 RX ADMIN — Medication 100 MILLIGRAM(S): at 05:32

## 2019-09-27 RX ADMIN — Medication 1 SPRAY(S): at 14:21

## 2019-09-27 RX ADMIN — PANTOPRAZOLE SODIUM 40 MILLIGRAM(S): 20 TABLET, DELAYED RELEASE ORAL at 11:32

## 2019-09-27 RX ADMIN — Medication 100 MILLIGRAM(S): at 21:26

## 2019-09-27 RX ADMIN — Medication 1 SPRAY(S): at 10:28

## 2019-09-27 NOTE — PROGRESS NOTE ADULT - ATTENDING COMMENTS
48yo female with PMHx/PSHx of total colectomy with primary anastomosis for UC s/p robotic total hysterectomy with salphino-oophorectomy, extensive NEMO 9/17 now with ileus versus obstruction. CT A/P from 9/20 demonstrates diffuse small bowel dilatation without transition point, large hyperattenuating right lower quadrant intraperitoneal structure with gas - hematoma versus hemostatic material, and extensive soft tissue emphysema. Removed NG tube yesterday morning, informing patient that she may need it replaced if she starts feeling ill again. Patient states she is feeling well, +BM, +flatus. Denies nausea/vomiting, chest pain or SOB. Tolerating CLD. Low grade fevers.      Examed 9/27  is soft, well-healing wounds, mildly distended, no rebound/guarding. Labs significant for leukocytosis 10 --> 16 --> 15.     Recommend repeat CT A/P with PO and IV contrast, patient initially refusing but discussed necessity for scan and she appeared agreeable this morning. Recommend replacing electrolytes PRN, hold antimotility medications, monitor bowel function, monitor I/O.

## 2019-09-27 NOTE — PROGRESS NOTE ADULT - SUBJECTIVE AND OBJECTIVE BOX
PGY3 progress note    Called by nurse for elevated temperature 100.6 @0000. Patient reports she drank hot juice prior to measuring her temperature. Patient upset that she is being awakened by medical staff. Refusing to answer questions and refusing to be examined. Again discussed with patient importance of imaging to further evaluate source of fevers and elevated WBC. Patient still refusing.     Dr. Sin aware. Will inform Dr. Houston.

## 2019-09-27 NOTE — PROGRESS NOTE ADULT - SUBJECTIVE AND OBJECTIVE BOX
PGY 4 Note    Patient examined at bedside, had temp 100.6 overnight.  Was evaluated at that time, normal physical exam and asymptomatic.  This AM pt reports feeling well.   Denies pain, fevers/chills, nausea/vomiting, CP/SOB/palpitations. Reports continued flatus and BM.  Tolerating clear liquid diet. Ambulating.     T(F): 99.6 (09-27-19 @ 07:22), Max: 100.6 (09-27-19 @ 00:23)  HR: 91 (09-27-19 @ 07:22) (91 - 105)  BP: 121/58 (09-27-19 @ 07:22) (120/56 - 183/106)  RR: 18 (09-27-19 @ 07:22) (18 - 18)  SpO2: --    I&O's Summary    26 Sep 2019 07:01  -  27 Sep 2019 07:00  --------------------------------------------------------  IN: 920 mL / OUT: 0 mL / NET: 920 mL    Physical Exam:  General: NAD  CVS: RRR. Nl S1S2  Lungs: CTAB  Abdomen: soft, non-tender, non-distended, hypoactive BS  Incision: C/D/I, no erythema, no draining  VE: deferred, no bleeding on pad/chux, no foul smelling discharge  Ext: 1+ edema b/l. No calf tenderness.    Labs:             8.8<L>  15.87<H> )-----------( 457<H>    ( 09-26 @ 09:46 )             29.2<L>               8.4<L>  16.44<H> )-----------( 401<H>    ( 09-25 @ 08:41 )             27.8<L>               8.5<L>  10.78 )-----------( 349      ( 09-24 @ 01:57 )             27.7<L>               9.2<L>  11.52<H> )-----------( 427<H>    ( 09-23 @ 14:30 )             29.4<L>               10.0<L>  8.86  )-----------( 451<H>    ( 09-23 @ 00:55 )             31.7<L>     09-26    146  |  110  |  13  ----------------------------<  136<H>  3.5   |  24  |  0.7    Ca    7.6<L>      26 Sep 2019 09:46  Phos  2.0     09-26  Mg     2.2     09-26              Trend:             8.8<L>  15.87<H> )-----------( 457<H>    ( 09-26 @ 09:46 )             29.2<L>               8.4<L>  16.44<H> )-----------( 401<H>    ( 09-25 @ 08:41 )             27.8<L>               8.5<L>  10.78 )-----------( 349      ( 09-24 @ 01:57 )             27.7<L>               9.2<L>  11.52<H> )-----------( 427<H>    ( 09-23 @ 14:30 )             29.4<L>               10.0<L>  8.86  )-----------( 451<H>    ( 09-23 @ 00:55 )             31.7<L>        Creatinine, Serum: 0.7 (09-26)  Creatinine, Serum: 0.7 (09-25)  Creatinine, Serum: 0.8 (09-24)  Creatinine, Serum: 0.9 (09-23)  Creatinine, Serum: 1.1 (09-23)  Creatinine, Serum: 0.8 (09-21)  Creatinine, Serum: 0.7 (09-20)  Creatinine, Serum: 0.7 (09-19)  Creatinine, Serum: 0.9 (09-17)      Medications:  MEDICATIONS  (STANDING):  benzocaine 20% Spray 1 Spray(s) Topical every 4 hours  dextrose 5% + sodium chloride 0.9% with potassium chloride 20 mEq/L 1000 milliLiter(s) (125 mL/Hr) IV Continuous <Continuous>  enoxaparin Injectable 40 milliGRAM(s) SubCutaneous daily  levoFLOXacin IVPB 750 milliGRAM(s) IV Intermittent every 24 hours  levoFLOXacin IVPB      metroNIDAZOLE  IVPB 500 milliGRAM(s) IV Intermittent every 8 hours  metroNIDAZOLE  IVPB      pantoprazole  Injectable 40 milliGRAM(s) IV Push daily    MEDICATIONS  (PRN):  acetaminophen   Tablet .. 650 milliGRAM(s) Oral every 6 hours PRN Temp greater or equal to 38C (100.4F), Mild Pain (1 - 3), Moderate Pain (4 - 6)  ibuprofen  Tablet. 600 milliGRAM(s) Oral every 6 hours PRN Mild Pain (1 - 3), Moderate Pain (4 - 6)  ondansetron Injectable 8 milliGRAM(s) IV Push every 8 hours PRN Nausea and/or Vomiting

## 2019-09-27 NOTE — PROGRESS NOTE ADULT - ASSESSMENT
49F w/ PMH of breast cancer, PE, and total colectomy for UC s/p laparoscopic total hysterectomy with salpingo-oophorectomy on 9/17 with ileus vs. obstruction.     Plan:   - would recommend CT w/ PO and IV contrast, particularly in the setting of persistently elevated WBC  - continue CLD, would not recommend advancing  - monitor bowel function  - trend WBC: 10.78 > 16.44 > 15.87 > 16.64  - continue IV antibiotics per ID

## 2019-09-27 NOTE — PROGRESS NOTE ADULT - ASSESSMENT
50yo P1 h/o UC with total colectomy, h/o DCIS s/p lumpectomy with radiation, h/o unprovoked PE found to have prothrombin mutation (heterozygous), with fibroid uterus and pelvic pain s/p RATLH, BSO, cystoscopy, EBL 250cc, complicated by postoperative ileus, intermittent fevers,  POD #10  GI: s/p NG for SBO/ileus, tolerating CLD, +flatus/BM, no n/v, consider advancing diet will discuss with surgery  DVT prophylaxis: lovenox, ambulation  Neuro: pain control  Cardio: no dx  Pulm: incentive spirometer and ambulation  ID: persistent low grade temps, has UTI, current abx IV levaquin and flagyl per ID,  collection on previous CT in area of evicel, will repeat imaging today CT PO/IV contrast  Endo: hypokalemia resolved, replete electrolytes prn  : urinating, no vaginal discharge or bleeding  Heme: H/H stable, VSS    Plan: repeat imaging, advance diet as tolerated, monitor temps, trend WBC, discuss diet with surgery and temps with ID    Dr Wayne aware

## 2019-09-27 NOTE — PROGRESS NOTE ADULT - SUBJECTIVE AND OBJECTIVE BOX
GENERAL SURGERY PROGRESS NOTE     MARCELLUS WILSON  49y  Female  Hospital day :10d  POD:  Procedure: Lysis, adhesions, pelvis, laparoscopic  Cystoscopy, diagnostic  Laparoscopic total hysterectomy with salpingo-oophorectomy for uterus greater than 250 grams    OVERNIGHT EVENTS:  +flatus, BM. Pt refused CT w/ PO and IV contrast.     T(F): 99.6 (09-27-19 @ 07:22), Max: 100.6 (09-27-19 @ 00:23)  HR: 91 (09-27-19 @ 07:22) (91 - 105)  BP: 121/58 (09-27-19 @ 07:22) (120/56 - 183/106)  ABP: --  ABP(mean): --  RR: 18 (09-27-19 @ 07:22) (18 - 18)  SpO2: --    DIET/FLUIDS: dextrose 5% + sodium chloride 0.9% with potassium chloride 20 mEq/L 1000 milliLiter(s) IV Continuous <Continuous>    GI proph:  pantoprazole  Injectable 40 milliGRAM(s) IV Push daily    ABx: levoFLOXacin IVPB 750 milliGRAM(s) IV Intermittent every 24 hours  levoFLOXacin IVPB      metroNIDAZOLE  IVPB 500 milliGRAM(s) IV Intermittent every 8 hours  metroNIDAZOLE  IVPB          PHYSICAL EXAM:  GENERAL: NAD, well-appearing  CHEST/LUNG: Clear to auscultation bilaterally  HEART: Regular rate and rhythm  ABDOMEN: Soft, TTP in lower abdomen, Nondistended;   EXTREMITIES:  No clubbing, cyanosis, or edema    LABS  Labs:  CAPILLARY BLOOD GLUCOSE                        8.3    16.46 )-----------( 442      ( 27 Sep 2019 06:13 )             27.2       Auto Neutrophil %: 71.8 % (09-27-19 @ 06:13)  Auto Immature Granulocyte %: 12.3 % (09-27-19 @ 06:13)    09-27    141  |  106  |  11  ----------------------------<  136<H>  3.8   |  25  |  0.7      Calcium, Total Serum: 7.5 mg/dL (09-27-19 @ 06:13)    ABG - ( 23 Sep 2019 02:00 )  pH: 7.55  /  pCO2: 44    /  pO2: 89    / HCO3: 38    / Base Excess: 14.0  /  SaO2: 97        ABG - ( 22 Sep 2019 13:31 )  pH: 7.55  /  pCO2: 40    /  pO2: 70    / HCO3: 35    / Base Excess: 11.5  /  SaO2: 95

## 2019-09-28 LAB
ANION GAP SERPL CALC-SCNC: 8 MMOL/L — SIGNIFICANT CHANGE UP (ref 7–14)
BASOPHILS # BLD AUTO: 0.04 K/UL — SIGNIFICANT CHANGE UP (ref 0–0.2)
BASOPHILS NFR BLD AUTO: 0.2 % — SIGNIFICANT CHANGE UP (ref 0–1)
BUN SERPL-MCNC: 10 MG/DL — SIGNIFICANT CHANGE UP (ref 10–20)
CALCIUM SERPL-MCNC: 7.4 MG/DL — LOW (ref 8.5–10.1)
CHLORIDE SERPL-SCNC: 105 MMOL/L — SIGNIFICANT CHANGE UP (ref 98–110)
CO2 SERPL-SCNC: 25 MMOL/L — SIGNIFICANT CHANGE UP (ref 17–32)
CREAT SERPL-MCNC: 0.6 MG/DL — LOW (ref 0.7–1.5)
CULTURE RESULTS: SIGNIFICANT CHANGE UP
EOSINOPHIL # BLD AUTO: 0.09 K/UL — SIGNIFICANT CHANGE UP (ref 0–0.7)
EOSINOPHIL NFR BLD AUTO: 0.5 % — SIGNIFICANT CHANGE UP (ref 0–8)
GLUCOSE SERPL-MCNC: 107 MG/DL — HIGH (ref 70–99)
HCT VFR BLD CALC: 27.3 % — LOW (ref 37–47)
HGB BLD-MCNC: 8.4 G/DL — LOW (ref 12–16)
IMM GRANULOCYTES NFR BLD AUTO: 12.6 % — HIGH (ref 0.1–0.3)
LYMPHOCYTES # BLD AUTO: 1.28 K/UL — SIGNIFICANT CHANGE UP (ref 1.2–3.4)
LYMPHOCYTES # BLD AUTO: 6.8 % — LOW (ref 20.5–51.1)
MAGNESIUM SERPL-MCNC: 1.7 MG/DL — LOW (ref 1.8–2.4)
MCHC RBC-ENTMCNC: 24.7 PG — LOW (ref 27–31)
MCHC RBC-ENTMCNC: 30.8 G/DL — LOW (ref 32–37)
MCV RBC AUTO: 80.3 FL — LOW (ref 81–99)
MONOCYTES # BLD AUTO: 1.23 K/UL — HIGH (ref 0.1–0.6)
MONOCYTES NFR BLD AUTO: 6.5 % — SIGNIFICANT CHANGE UP (ref 1.7–9.3)
NEUTROPHILS # BLD AUTO: 13.87 K/UL — HIGH (ref 1.4–6.5)
NEUTROPHILS NFR BLD AUTO: 73.4 % — SIGNIFICANT CHANGE UP (ref 42.2–75.2)
NRBC # BLD: 0 /100 WBCS — SIGNIFICANT CHANGE UP (ref 0–0)
PHOSPHATE SERPL-MCNC: 3.2 MG/DL — SIGNIFICANT CHANGE UP (ref 2.1–4.9)
PLATELET # BLD AUTO: 466 K/UL — HIGH (ref 130–400)
POTASSIUM SERPL-MCNC: 4.2 MMOL/L — SIGNIFICANT CHANGE UP (ref 3.5–5)
POTASSIUM SERPL-SCNC: 4.2 MMOL/L — SIGNIFICANT CHANGE UP (ref 3.5–5)
RBC # BLD: 3.4 M/UL — LOW (ref 4.2–5.4)
RBC # FLD: 18.2 % — HIGH (ref 11.5–14.5)
SODIUM SERPL-SCNC: 138 MMOL/L — SIGNIFICANT CHANGE UP (ref 135–146)
SPECIMEN SOURCE: SIGNIFICANT CHANGE UP
WBC # BLD: 18.9 K/UL — HIGH (ref 4.8–10.8)
WBC # FLD AUTO: 18.9 K/UL — HIGH (ref 4.8–10.8)

## 2019-09-28 PROCEDURE — 74176 CT ABD & PELVIS W/O CONTRAST: CPT | Mod: 26

## 2019-09-28 PROCEDURE — 99232 SBSQ HOSP IP/OBS MODERATE 35: CPT

## 2019-09-28 PROCEDURE — 99231 SBSQ HOSP IP/OBS SF/LOW 25: CPT | Mod: 24

## 2019-09-28 RX ORDER — MAGNESIUM SULFATE 500 MG/ML
2 VIAL (ML) INJECTION ONCE
Refills: 0 | Status: COMPLETED | OUTPATIENT
Start: 2019-09-28 | End: 2019-09-28

## 2019-09-28 RX ADMIN — Medication 650 MILLIGRAM(S): at 21:15

## 2019-09-28 RX ADMIN — PANTOPRAZOLE SODIUM 40 MILLIGRAM(S): 20 TABLET, DELAYED RELEASE ORAL at 14:24

## 2019-09-28 RX ADMIN — Medication 100 MILLIGRAM(S): at 05:24

## 2019-09-28 RX ADMIN — Medication 100 MILLIGRAM(S): at 14:23

## 2019-09-28 RX ADMIN — ENOXAPARIN SODIUM 40 MILLIGRAM(S): 100 INJECTION SUBCUTANEOUS at 11:38

## 2019-09-28 RX ADMIN — Medication 50 GRAM(S): at 14:23

## 2019-09-28 RX ADMIN — Medication 100 MILLIGRAM(S): at 21:14

## 2019-09-28 NOTE — PROGRESS NOTE ADULT - SUBJECTIVE AND OBJECTIVE BOX
GENERAL SURGERY PROGRESS NOTE     MARCELLUS WILSON  49y  Female  Hospital day :11d  POD:  Procedure: Lysis, adhesions, pelvis, laparoscopic  Cystoscopy, diagnostic  Laparoscopic total hysterectomy with salpingo-oophorectomy for uterus greater than 250 grams    OVERNIGHT EVENTS:  Pt agreed to CT A/P. Ordered but not performed. +gas, BM.     T(F): 99 (09-28-19 @ 03:00), Max: 100.2 (09-27-19 @ 15:43)  HR: 95 (09-28-19 @ 03:00) (91 - 95)  BP: 129/65 (09-28-19 @ 03:00) (116/62 - 131/62)  ABP: --  ABP(mean): --  RR: 18 (09-28-19 @ 03:00) (18 - 18)  SpO2: --    DIET/FLUIDS: dextrose 5% + sodium chloride 0.9% with potassium chloride 20 mEq/L 1000 milliLiter(s) IV Continuous <Continuous>     GI proph:  pantoprazole  Injectable 40 milliGRAM(s) IV Push daily    ABx: levoFLOXacin IVPB 750 milliGRAM(s) IV Intermittent every 24 hours  levoFLOXacin IVPB      metroNIDAZOLE  IVPB 500 milliGRAM(s) IV Intermittent every 8 hours  metroNIDAZOLE  IVPB          PHYSICAL EXAM:  GENERAL: NAD, well-appearing  CHEST/LUNG: Clear to auscultation bilaterally  HEART: Regular rate and rhythm  ABDOMEN: Soft, Nontender, Nondistended;   EXTREMITIES:  No clubbing, cyanosis, or edema      LABS  Labs:  CAPILLARY BLOOD GLUCOSE                        8.3    16.46 )-----------( 442      ( 27 Sep 2019 06:13 )             27.2       Auto Neutrophil %: 71.8 % (09-27-19 @ 06:13)  Auto Immature Granulocyte %: 12.3 % (09-27-19 @ 06:13)    09-27    141  |  106  |  11  ----------------------------<  136<H>  3.8   |  25  |  0.7      Calcium, Total Serum: 7.5 mg/dL (09-27-19 @ 06:13)    ABG - ( 23 Sep 2019 02:00 )  pH: 7.55  /  pCO2: 44    /  pO2: 89    / HCO3: 38    / Base Excess: 14.0  /  SaO2: 97        ABG - ( 22 Sep 2019 13:31 )  pH: 7.55  /  pCO2: 40    /  pO2: 70    / HCO3: 35    / Base Excess: 11.5  /  SaO2: 95

## 2019-09-28 NOTE — PROGRESS NOTE ADULT - SUBJECTIVE AND OBJECTIVE BOX
Patient evaluated after CT scan was performed. We received a call after it was completed that IV contrast would not pass through the midline catheter and so noncontrast CT scan was performed. Explained to patient that noncontrast CT is not ideal and cannot adequately differentiate pelvic abscess or other pelvic masses. Patient was very agitated, her family was at bedside and assisted with calming her down. She stated that she does not want any more contrast of any kind or any more imaging. She wants to go home. Discussed that we recommend CT scan with IV contrast. GIven patient's adamant refusal and long standing NPO status prior to this CT, will allow patient to eat and rest before re-offering imaging. She tolerated clear liquids, will allow full liquid diet. She has not had any nausea or vomiting for the last few days. She continues to pass flatus and bowel movements. She denies fevers or chills, last fever 100.6F on 9/27 at 0023, afebrile since. WBC trending upward, however no signs or symptoms of infection.    Vital Signs Last 24 Hrs  T(F): 99.4 (28 Sep 2019 15:24), Max: 100.1 (27 Sep 2019 17:23)  HR: 104 (28 Sep 2019 15:24) (92 - 104)  BP: 125/68 (28 Sep 2019 15:24) (115/56 - 131/62)  RR: 18 (28 Sep 2019 15:24) (18 - 18)    Gen: AAOx3, NAD, patient visibly annoyed and frustrated  Chest: lungs CTA, S1S2 RRR  Abd: soft, well healing robotic trochar incisions, nontender, weak bowel sounds (unchanged from AM exam), no rebound, ro rigidity, no guarding    Plan:  - Will advance to full liquid diet  - Will continue to offer CT with IV contrast  - Monitor vitals  - C/w antibiotics  - Pain management PRN    Discussed with Dr. Green

## 2019-09-28 NOTE — PROGRESS NOTE ADULT - SUBJECTIVE AND OBJECTIVE BOX
Patient had temp of 100.4F at 2005. Denies subjective fevers or chills. Denies shortness of breath, chest pain, abdominal pain, or calf pain. She was able to tolerate full liquid tray without nausea or vomiting. She is resting comfortably, no acute distress. Heart sounds normal, manual HR 94BPM, lungs CTA bilaterally, abdomen soft, nontender, no suprapubic tenderness, no calf tenderness.    Vital Signs Last 24 Hrs  T(F): 100.4 (28 Sep 2019 20:05), Max: 100.4 (28 Sep 2019 20:05)  HR: 101 (28 Sep 2019 20:05) (94 - 104)  BP: 130/64 (28 Sep 2019 20:05) (115/56 - 130/64)  RR: 18 (28 Sep 2019 20:05) (18 - 18)    Patient reoffered CT scan with IV contrast and repeat labs, she adamantly refused, stating that she does not want any more testing now and just wants to sleep. Discussed that she is still having elevated temps and it is concerning, she stated that she just wants to sleep. Will continue to monitor.    Discussed with Dr. Green

## 2019-09-28 NOTE — PROGRESS NOTE ADULT - ATTENDING COMMENTS
48yo female with PMHx/PSHx of total colectomy with primary anastomosis for UC s/p robotic total hysterectomy with salphino-oophorectomy, extensive NEMO 9/17 now with ileus versus obstruction. CT A/P from 9/20 demonstrates diffuse small bowel dilatation without transition point, large hyperattenuating right lower quadrant intraperitoneal structure with gas - hematoma versus hemostatic material, and extensive soft tissue emphysema. Removed NG tube yesterday morning, informing patient that she may need it replaced if she starts feeling ill again. Patient states she is feeling well, +BM, +flatus. Denies nausea/vomiting, chest pain or SOB. Tolerating CLD. Low grade fevers.      Examed 9/28  is soft, well-healed wounds, mildly distended, no rebound/guarding. Labs significant for leukocytosis      Recommend repeat CT A/P with PO and IV contrast, patient initially refusing but discussed necessity for scan and she appeared agreeable this morning. Recommend replacing electrolytes PRN, hold antimotility medications, monitor bowel function, monitor I/O.

## 2019-09-28 NOTE — PROGRESS NOTE ADULT - SUBJECTIVE AND OBJECTIVE BOX
Chief Complaint:     HPI: Pt doing well, pain well controlled. No overnight events, no acute complaints. She continues to pass gas and had a bowel last night. She denies nausea or vomiting. She has been NPO overnight while she awaits CT scan of abd/pelvis.    ROS: Denies cardiovascular or respiratory symptoms    PAST MEDICAL & SURGICAL HISTORY:  OA (osteoarthritis)  Anxiety  Breast cancer: left breast cancer,, lumpectomy&amp;  radiation (~2017)  S/P colectomy: hx ulcerative colitis  Liver disease: &quot;twisted liver&quot;, hemangioma  MVP (mitral valve prolapse)  Bell palsy  Pulmonary embolism: &gt; 5 yrs ago, no current meds, pt is unsure of cause  Breast cancer: left side, lumpectomy  H/O colectomy: colostom&amp; reversal pt sttaes pouch is on the left side  S/P lumpectomy, left breast      Physical Exam  Vital Signs Last 24 Hrs  T(F): 99.4 (28 Sep 2019 07:17), Max: 100.2 (27 Sep 2019 15:43)  HR: 95 (28 Sep 2019 07:17) (92 - 95)  BP: 115/56 (28 Sep 2019 07:17) (115/56 - 131/62)  RR: 18 (28 Sep 2019 07:17) (18 - 18)    Physical exam:  General - AAOx3, NAD  Heart - S1S2, RRR  Lungs - CTA BL  Abdomen:  - Soft, nontender, mildly distended, weak bowel sounds  - Clean, dry, intact laparoscopic skin incisions  Pelvis/Vagina - Minimal bleeding  Extremities - No calf tenderness, no swelling    Labs:                        8.4    18.90 )-----------( 466      ( 28 Sep 2019 06:09 )             27.3                         8.3    16.46 )-----------( 442      ( 27 Sep 2019 06:13 )             27.2     138  |  105  |  10  ----------------------------<107  4.2  |  25  |  0.6    Magnesium, Serum: 1.7 mg/dL (09-28-19 @ 06:09)    Phosphorus Level, Serum: 3.2 mg/dL (09-28-19 @ 06:09)

## 2019-09-28 NOTE — PROGRESS NOTE ADULT - ASSESSMENT
50 yo P1 h/o UC with total colectomy, h/o DCIS s/p lumpectomy with radiation, h/o unprovoked PE found to have prothrombin mutation (heterozygous), with fibroid uterus and pelvic pain s/p RATLH, BSO, cystoscopy, EBL 250cc, complicated by postoperative ileus, intermittent fevers, POD #11    GI: s/p NG for likely SBO, possible ileus, tolerating clear liquid diet yesterday, temporarily NPO for CT scan. Passing gas and bowel movements, nausea and vomiting resolved  DVT prophylaxis: lovenox, ambulation encouraged  Neuro: pain control  Cardio: no dx  Pulm: incentive spirometer encouraged  ID: Last temp 9/27 @0023, continue with IV levaquin and flagyl  Endo: hypokalemia resolved, will continue to monitor  Heme: H/H stable, vital signs normal, will continue to trend WBC count    Discussed with Dr. Green

## 2019-09-28 NOTE — PROGRESS NOTE ADULT - ASSESSMENT
49F w/ PMH of breast cancer, PE, and total colectomy for UC s/p laparoscopic total hysterectomy with salpingo-oophorectomy on 9/17 with ileus vs. obstruction.     Plan:   - FU CT A/P  - continue CLD  - monitor bowel function  - trend WBC: 10.78 > 16.44 > 15.87 > 16.46  - continue IV antibiotics per ID

## 2019-09-29 LAB
ALBUMIN SERPL ELPH-MCNC: 1.9 G/DL — LOW (ref 3.5–5.2)
ALP SERPL-CCNC: 71 U/L — SIGNIFICANT CHANGE UP (ref 30–115)
ALT FLD-CCNC: 21 U/L — SIGNIFICANT CHANGE UP (ref 0–41)
ANION GAP SERPL CALC-SCNC: 10 MMOL/L — SIGNIFICANT CHANGE UP (ref 7–14)
AST SERPL-CCNC: 31 U/L — SIGNIFICANT CHANGE UP (ref 0–41)
BASOPHILS # BLD AUTO: 0.03 K/UL — SIGNIFICANT CHANGE UP (ref 0–0.2)
BASOPHILS NFR BLD AUTO: 0.1 % — SIGNIFICANT CHANGE UP (ref 0–1)
BILIRUB SERPL-MCNC: 0.3 MG/DL — SIGNIFICANT CHANGE UP (ref 0.2–1.2)
BUN SERPL-MCNC: 8 MG/DL — LOW (ref 10–20)
CALCIUM SERPL-MCNC: 7.5 MG/DL — LOW (ref 8.5–10.1)
CHLORIDE SERPL-SCNC: 102 MMOL/L — SIGNIFICANT CHANGE UP (ref 98–110)
CO2 SERPL-SCNC: 25 MMOL/L — SIGNIFICANT CHANGE UP (ref 17–32)
CREAT SERPL-MCNC: 0.6 MG/DL — LOW (ref 0.7–1.5)
EOSINOPHIL # BLD AUTO: 0.07 K/UL — SIGNIFICANT CHANGE UP (ref 0–0.7)
EOSINOPHIL NFR BLD AUTO: 0.3 % — SIGNIFICANT CHANGE UP (ref 0–8)
GLUCOSE SERPL-MCNC: 136 MG/DL — HIGH (ref 70–99)
HCT VFR BLD CALC: 25.9 % — LOW (ref 37–47)
HGB BLD-MCNC: 8 G/DL — LOW (ref 12–16)
IMM GRANULOCYTES NFR BLD AUTO: 11.3 % — HIGH (ref 0.1–0.3)
LYMPHOCYTES # BLD AUTO: 1.43 K/UL — SIGNIFICANT CHANGE UP (ref 1.2–3.4)
LYMPHOCYTES # BLD AUTO: 7.1 % — LOW (ref 20.5–51.1)
MAGNESIUM SERPL-MCNC: 1.9 MG/DL — SIGNIFICANT CHANGE UP (ref 1.8–2.4)
MCHC RBC-ENTMCNC: 25 PG — LOW (ref 27–31)
MCHC RBC-ENTMCNC: 30.9 G/DL — LOW (ref 32–37)
MCV RBC AUTO: 80.9 FL — LOW (ref 81–99)
MONOCYTES # BLD AUTO: 1.15 K/UL — HIGH (ref 0.1–0.6)
MONOCYTES NFR BLD AUTO: 5.7 % — SIGNIFICANT CHANGE UP (ref 1.7–9.3)
NEUTROPHILS # BLD AUTO: 15.18 K/UL — HIGH (ref 1.4–6.5)
NEUTROPHILS NFR BLD AUTO: 75.5 % — HIGH (ref 42.2–75.2)
NRBC # BLD: 0 /100 WBCS — SIGNIFICANT CHANGE UP (ref 0–0)
PHOSPHATE SERPL-MCNC: 2.8 MG/DL — SIGNIFICANT CHANGE UP (ref 2.1–4.9)
PLATELET # BLD AUTO: 468 K/UL — HIGH (ref 130–400)
POTASSIUM SERPL-MCNC: 4.3 MMOL/L — SIGNIFICANT CHANGE UP (ref 3.5–5)
POTASSIUM SERPL-SCNC: 4.3 MMOL/L — SIGNIFICANT CHANGE UP (ref 3.5–5)
PROT SERPL-MCNC: 4.8 G/DL — LOW (ref 6–8)
RBC # BLD: 3.2 M/UL — LOW (ref 4.2–5.4)
RBC # FLD: 18.1 % — HIGH (ref 11.5–14.5)
SODIUM SERPL-SCNC: 137 MMOL/L — SIGNIFICANT CHANGE UP (ref 135–146)
WBC # BLD: 20.14 K/UL — HIGH (ref 4.8–10.8)
WBC # FLD AUTO: 20.14 K/UL — HIGH (ref 4.8–10.8)

## 2019-09-29 PROCEDURE — 74177 CT ABD & PELVIS W/CONTRAST: CPT | Mod: 26

## 2019-09-29 PROCEDURE — 99231 SBSQ HOSP IP/OBS SF/LOW 25: CPT

## 2019-09-29 PROCEDURE — 93971 EXTREMITY STUDY: CPT | Mod: 26,RT

## 2019-09-29 RX ORDER — IOHEXOL 300 MG/ML
30 INJECTION, SOLUTION INTRAVENOUS ONCE
Refills: 0 | Status: COMPLETED | OUTPATIENT
Start: 2019-09-29 | End: 2019-09-29

## 2019-09-29 RX ADMIN — Medication 100 MILLIGRAM(S): at 21:03

## 2019-09-29 RX ADMIN — ENOXAPARIN SODIUM 40 MILLIGRAM(S): 100 INJECTION SUBCUTANEOUS at 12:02

## 2019-09-29 RX ADMIN — Medication 100 MILLIGRAM(S): at 05:39

## 2019-09-29 RX ADMIN — PANTOPRAZOLE SODIUM 40 MILLIGRAM(S): 20 TABLET, DELAYED RELEASE ORAL at 12:01

## 2019-09-29 RX ADMIN — IOHEXOL 30 MILLILITER(S): 300 INJECTION, SOLUTION INTRAVENOUS at 17:04

## 2019-09-29 RX ADMIN — Medication 650 MILLIGRAM(S): at 16:58

## 2019-09-29 NOTE — PROGRESS NOTE ADULT - ATTENDING COMMENTS
Case discussed with Radiology, they believe that pelvic sono won't be helpful in the diagnosis. Reoffered CT scan and patient accepted.     Right arm dupplex shows superficial thromboflebitis and midline appeared displaced, no DVT. Midline removed, tip sent for culture. Right IJV access obtained.     ID reconsult placed for possible adjustment of antibiotic regimen. For now will continue with Levofloxacin+Metronidazole. f/u cultures. f/u temps.

## 2019-09-29 NOTE — PROGRESS NOTE ADULT - ASSESSMENT
50 yo P1 h/o UC with total colectomy, h/o DCIS s/p lumpectomy with radiation, h/o unprovoked PE found to have prothrombin mutation (heterozygous), with fibroid uterus and pelvic pain s/p RATLH, BSO, cystoscopy, EBL 250cc, complicated by postoperative ileus, with persistent intermittent fevers and rising white count, POD #12.    GI: s/p NG for likely SBO, possible ileus, tolerating full liquids. Passing gas and bowel movements, nausea and vomiting resolved.  DVT prophylaxis: lovenox, ambulation encouraged  Neuro: pain well control  Cardio: no dx  Pulm: incentive spirometer encouraged  ID: Last temp 9/28 @2005, on IV levaquin and flagyl. Given persistent temps and rising white count (15-->16-->18), plan for repeat CT with IV contrast. CT completed 9/28 however could not use midline for contrast, only PO contrast done. Possible 7c, collection with air and debris however would need IV contrast to differentiate from bowel. Discussed with patient repeat CT with IV contrast, pt states she needs a break, will think about CT after labwork. Will re-consult ID today for antibiotic reccs  Endo: hypokalemia resolved, will continue to monitor  Heme: H/H stable, vital signs normal, will continue to trend WBC count    Prolonged discussion with pt regarding importance of CT to r/o intra-abdominal abscess. Discussed risks for further infection/sepsis/life-threatening infection if undiagnosed/delayed and untreated. Pt understands, wishes to re-discuss CT after labwork    Will discuss with Dr. Paris and Dr. Wayne 50 yo P1 h/o UC with total colectomy, h/o DCIS s/p lumpectomy with radiation, h/o unprovoked PE found to have prothrombin mutation (heterozygous), with fibroid uterus and pelvic pain s/p RATLH, BSO, cystoscopy, EBL 250cc, complicated by postoperative ileus, with persistent intermittent fevers and rising white count, POD #12.    GI: s/p NG for likely SBO, possible ileus, tolerating full liquids. Passing gas and bowel movements, nausea and vomiting resolved.  DVT prophylaxis: lovenox, ambulation encouraged  Neuro: pain well control  Cardio: no dx  Pulm: incentive spirometer encouraged  ID: Last temp 9/28 @2005, on IV levaquin and flagyl. Given persistent temps and rising white count (15-->16-->18), plan for repeat CT with IV contrast. CT completed 9/28 however could not use midline for contrast, only PO contrast done. Possible 7c, collection with air and debris however would need IV contrast to differentiate from bowel. Discussed with patient repeat CT with IV contrast, pt states she needs a break, will think about CT after labwork. Will re-consult ID today for antibiotic reccs  Endo: hypokalemia resolved, will continue to monitor  Heme: H/H stable, vital signs normal, will continue to trend WBC count    Prolonged discussion with pt regarding importance of CT to r/o intra-abdominal abscess. Discussed risks for further   infection/sepsis/life-threatening infection if undiagnosed/delayed and untreated. Pt understands, wishes to re-discuss CT after labwork    Will discuss with Dr. Paris and Dr. Wayne

## 2019-09-29 NOTE — PROGRESS NOTE ADULT - ASSESSMENT
Assessment:   50 yo P1 h/o UC with total colectomy, h/o DCIS s/p lumpectomy with radiation, h/o unprovoked PE found to have prothrombin mutation (heterozygous), with fibroid uterus and pelvic pain s/p RATLH, BSO, cystoscopy, EBL 250cc, complicated by postoperative ileus, with persistent intermittent fevers and rising white count, POD #12.    GI: s/p NG for likely SBO, possible ileus, was tolerating full liquids this AM, now NPO for CT scan, passing gas and bowel movements, nausea and vomiting resolved  DVT prophylaxis: Lovenox, ambulation encouraged  Neuro: Pain well control  Cardio: No dx  Pulm: Incentive spirometer encouraged  ID: Last temp of 100.6 9/29 @1547, on IV levaquin and flagyl. Given persistent temps and rising white count (15-->16-->18 --> 20), plan for repeat CT with IV and PO contrast. CT completed 9/28 AM however could not use midline for contrast. Possible 7c, collection with air and debris however would need IV contrast to differentiate from bowel. Patient prepped for repeat CT with IV and PO contrast. Re-consulted ID today for antibiotic reccs  Endo: Hypokalemia resolved, will continue to monitor  Heme: H/H stable, vital signs normal, will continue to trend WBC count    Dr. Paris and Dr. Coyne aware.

## 2019-09-29 NOTE — PROGRESS NOTE ADULT - ATTENDING COMMENTS
#Fibroid uterus s/p RATLH, BSO, cystoscopy POD 12, resolved post-op ileus, now with post op fevers and WBC trending up, on physical exam RLQ tenderness.     - Send Ucx and Bcx.    - Will switch antibiotic regimen to Aztreonam+Clindamycin. Recall ID for reevaluation.   - CT non-contrast inconclusive, possible 7cm collection in the pelvis. Patient refusing repeat CT. Will perform pelvic sonogram. If unable to see pelvic collection will reoffer CT with contrast.   - Right arm has midline, no erythema or tenderness, but appears swollen - Will perform sonogram of Right upper extremity to r/o DVT  - F/u temps, repeat Bcx if another fever. Trend WBC. #Fibroid uterus s/p RATLH, BSO, cystoscopy POD 12, resolved post-op ileus, now with post op fevers and WBC trending up, on physical exam RLQ tenderness.     - Send Ucx and Bcx.    - Recall ID for possible adjustment of antibiotic regimen (aztreonam+clindamycin?)   - CT non-contrast inconclusive, possible 7cm collection in the pelvis. Patient refusing repeat CT. Will perform pelvic sonogram. If unable to see pelvic collection will reoffer CT with contrast.   - Right arm has midline, no erythema or tenderness, but appears swollen - Will perform sonogram of Right upper extremity to r/o DVT and consider removing midline  - F/u temps, repeat Bcx if another fever. Trend WBC.

## 2019-09-29 NOTE — PROGRESS NOTE ADULT - SUBJECTIVE AND OBJECTIVE BOX
Chief Complaint: Postoperative ileus, with persistent intermittent fevers    HPI: Pt has been refusing CT scan, she finally was convinced to get it, and was taken down yesterday, on 9/28/2019, for a scan but because her midline was not in place IV contrast could not be given. Her WBC is still rising and she continues to have fevers. Her midline was checked by our team this morning, it was noted that there was no return of blood, her arm was noted to be swollen. Right upper extremity US was ordered and it was noted that on top of the midline not being placed in a vein, she also had a basilic vein thrombosis. Midline was removed, catheter tip was sent for culture, and external jugular catheter was inserted. She also agreed to get another CT scan with a new catheter to make sure she gets IV and PO contrast this time. She has been NPO since 1215, currently drinking the PO contrast.    ROS: Denies cardiovascular or respiratory symptoms    PAST MEDICAL & SURGICAL HISTORY:  OA (osteoarthritis)  Anxiety  Breast cancer: left breast cancer,, lumpectomy&amp;  radiation (~2017)  S/P colectomy: hx ulcerative colitis  Liver disease: &quot;twisted liver&quot;, hemangioma  MVP (mitral valve prolapse)  Bell palsy  Pulmonary embolism: &gt; 5 yrs ago, no current meds, pt is unsure of cause  Breast cancer: left side, lumpectomy  H/O colectomy: colostom&amp; reversal pt sttaes pouch is on the left side  S/P lumpectomy, left breast    Physical Exam  Vital Signs Last 24 Hrs  T(F): 100.6 (29 Sep 2019 15:47), Max: 100.6 (29 Sep 2019 15:47)  HR: 95 (29 Sep 2019 15:47) (84 - 101)  BP: 126/60 (29 Sep 2019 15:47) (118/57 - 130/64)  RR: 18 (29 Sep 2019 15:47) (18 - 18)    Physical exam:  General - AAOx3, tired looking   Abdomen:  - Soft, nontender  Pelvis/Vagina - No bleeding  Extremities: No calf tenderness, no swelling bilaterally, right upper arm swollen    Labs:                        8.0    20.14 )-----------( 468      ( 29 Sep 2019 09:07 )             25.9                         8.4    18.90 )-----------( 466      ( 28 Sep 2019 06:09 )             27.3     Assessment:   50 yo P1 h/o UC with total colectomy, h/o DCIS s/p lumpectomy with radiation, h/o unprovoked PE found to have prothrombin mutation (heterozygous), with fibroid uterus and pelvic pain s/p RATLH, BSO, cystoscopy, EBL 250cc, complicated by postoperative ileus, with persistent intermittent fevers and rising white count, POD #12.    GI: s/p NG for likely SBO, possible ileus, was tolerating full liquids this AM, now NPO for CT scan, passing gas and bowel movements, nausea and vomiting resolved  DVT prophylaxis: Lovenox, ambulation encouraged  Neuro: Pain well control  Cardio: No dx  Pulm: Incentive spirometer encouraged  ID: Last temp of 100.6 9/29 @1547, on IV levaquin and flagyl. Given persistent temps and rising white count (15-->16-->18 --> 20), plan for repeat CT with IV and PO contrast. CT completed 9/28 AM however could not use midline for contrast, only PO contrast done. Possible 7c, collection with air and debris however would need IV contrast to differentiate from bowel. Patient prepped for repeat CT with IV and PO contrast. Re-consulted ID today for antibiotic reccs  Endo: Hypokalemia resolved, will continue to monitor  Heme: H/H stable, vital signs normal, will continue to trend WBC count    Dr. Paris and Dr. Coyne aware. Chief Complaint: Postoperative ileus, with persistent intermittent fevers    HPI: Pt has been refusing CT scan, she finally was convinced to get it, and was taken down yesterday, on 9/28/2019, for a scan but because her midline was not in place IV contrast could not be given. Her WBC is still rising and she continues to have fevers. Her midline was checked by our team this morning, it was noted that there was no return of blood, her arm was noted to be swollen. Right upper extremity US was ordered and it was noted that on top of the midline not being placed in a vein, she also had a basilic vein thrombosis. Midline was removed, catheter tip was sent for culture, and external jugular catheter was inserted. She also agreed to get another CT scan with a new catheter to make sure she gets IV and PO contrast this time. She has been NPO since 1215, currently drinking the PO contrast.    ROS: Denies cardiovascular or respiratory symptoms    PAST MEDICAL & SURGICAL HISTORY:  OA (osteoarthritis)  Anxiety  Breast cancer: left breast cancer,, lumpectomy&amp;  radiation (~2017)  S/P colectomy: hx ulcerative colitis  Liver disease: &quot;twisted liver&quot;, hemangioma  MVP (mitral valve prolapse)  Bell palsy  Pulmonary embolism: &gt; 5 yrs ago, no current meds, pt is unsure of cause  Breast cancer: left side, lumpectomy  H/O colectomy: colostom&amp; reversal pt sttaes pouch is on the left side  S/P lumpectomy, left breast    Physical Exam  Vital Signs Last 24 Hrs  T(F): 100.6 (29 Sep 2019 15:47), Max: 100.6 (29 Sep 2019 15:47)  HR: 95 (29 Sep 2019 15:47) (84 - 101)  BP: 126/60 (29 Sep 2019 15:47) (118/57 - 130/64)  RR: 18 (29 Sep 2019 15:47) (18 - 18)    Physical exam:  General - AAOx3, tired looking   Abdomen:  - Soft, nontender  Pelvis/Vagina - No bleeding  Extremities: No calf tenderness, no swelling bilaterally, right upper arm swollen    Labs:                        8.0    20.14 )-----------( 468      ( 29 Sep 2019 09:07 )             25.9                         8.4    18.90 )-----------( 466      ( 28 Sep 2019 06:09 )             27.3     Assessment:   50 yo P1 h/o UC with total colectomy, h/o DCIS s/p lumpectomy with radiation, h/o unprovoked PE found to have prothrombin mutation (heterozygous), with fibroid uterus and pelvic pain s/p RATLH, BSO, cystoscopy, EBL 250cc, complicated by postoperative ileus, with persistent intermittent fevers and rising white count, POD #12.    GI: s/p NG for likely SBO, possible ileus, was tolerating full liquids this AM, now NPO for CT scan, passing gas and bowel movements, nausea and vomiting resolved  DVT prophylaxis: Lovenox, ambulation encouraged  Neuro: Pain well control  Cardio: No dx  Pulm: Incentive spirometer encouraged  ID: Last temp of 100.6 9/29 @1547, on IV levaquin and flagyl. Given persistent temps and rising white count (15-->16-->18 --> 20), plan for repeat CT with IV and PO contrast. CT completed 9/28 AM however could not use midline for contrast. Possible 7c, collection with air and debris however would need IV contrast to differentiate from bowel. Patient prepped for repeat CT with IV and PO contrast. Re-consulted ID today for antibiotic reccs  Endo: Hypokalemia resolved, will continue to monitor  Heme: H/H stable, vital signs normal, will continue to trend WBC count    Dr. Paris and Dr. Coyne aware. Chief Complaint: Postoperative ileus, with persistent intermittent fevers    Pt seen and evaluated multiple occasions throughout the day. Was initially counseled on need to repeat CT with IV/PO contrast to properly delineate new collection. Pt initially refused, wanted to wait for repeat AM labs. AM labs showed rising white count now at 20. Eventually agree to have repeat CT, however midline, pts only access, no longer working with arm swelling. Right upper extremity US was ordered and it was noted that on top of the midline not being placed in a vein, she also had a basilic vein thrombosis. Midline was removed, catheter tip was sent for culture, and external jugular catheter was inserted. Pt had another fever at 1530, 100.6. She also agreed to get another CT with IV and PO contrast this time. She has been NPO since 1215, currently drinking the PO contrast.    ROS: Denies cardiovascular or respiratory symptoms    PAST MEDICAL & SURGICAL HISTORY:  OA (osteoarthritis)  Anxiety  Breast cancer: left breast cancer,, lumpectomy&amp;  radiation (~2017)  S/P colectomy: hx ulcerative colitis  Liver disease: &quot;twisted liver&quot;, hemangioma  MVP (mitral valve prolapse)  Bell palsy  Pulmonary embolism: &gt; 5 yrs ago, no current meds, pt is unsure of cause  Breast cancer: left side, lumpectomy  H/O colectomy: colostom&amp; reversal pt sttaes pouch is on the left side  S/P lumpectomy, left breast    Physical Exam  Vital Signs Last 24 Hrs  T(F): 100.6 (29 Sep 2019 15:47), Max: 100.6 (29 Sep 2019 15:47)  HR: 95 (29 Sep 2019 15:47) (84 - 101)  BP: 126/60 (29 Sep 2019 15:47) (118/57 - 130/64)  RR: 18 (29 Sep 2019 15:47) (18 - 18)    Physical exam:  General - AAOx3, tired looking   Abdomen:  - Soft, nontender  Pelvis/Vagina - No bleeding  Extremities: No calf tenderness, no swelling bilaterally, right upper arm swollen    Labs:                        8.0    20.14 )-----------( 468      ( 29 Sep 2019 09:07 )             25.9                         8.4    18.90 )-----------( 466      ( 28 Sep 2019 06:09 )             27.3

## 2019-09-29 NOTE — PROGRESS NOTE ADULT - ATTENDING COMMENTS
Patient seen and examined with surgery team on rounds and discussed management plans. Patient upset in hospital 2 weeks abd soft some tenderness on Right, Ct non contrast only.? collection

## 2019-09-29 NOTE — PROGRESS NOTE ADULT - SUBJECTIVE AND OBJECTIVE BOX
GENERAL SURGERY PROGRESS NOTE     MARCELLUS WILSON  49y  Female  Hospital day :12d  POD:  Procedure: Lysis, adhesions, pelvis, laparoscopic  Cystoscopy, diagnostic  Laparoscopic total hysterectomy with salpingo-oophorectomy for uterus greater than 250 grams    OVERNIGHT EVENTS: No acute events. Got CT yesterday but IV blew --> non contrast limited study (possible collection but require contrast to differentiate abdominal/pelvic structures from abscesses)     T(F): 98.8 (09-28-19 @ 23:00), Max: 100.4 (09-28-19 @ 20:05)  HR: 93 (09-28-19 @ 23:00) (93 - 104)  BP: 118/57 (09-28-19 @ 23:00) (115/56 - 130/64)  ABP: --  ABP(mean): --  RR: 18 (09-28-19 @ 23:00) (18 - 18)  SpO2: --    DIET/FLUIDS: dextrose 5% + sodium chloride 0.9% with potassium chloride 20 mEq/L 1000 milliLiter(s) IV Continuous <Continuous>      URINE:   09-27-19 @ 07:01  -  09-28-19 @ 07:00  --------------------------------------------------------  OUT: 1 mL       GI proph:  pantoprazole  Injectable 40 milliGRAM(s) IV Push daily    AC/ proph:   ABx: levoFLOXacin IVPB 750 milliGRAM(s) IV Intermittent every 24 hours  levoFLOXacin IVPB      metroNIDAZOLE  IVPB 500 milliGRAM(s) IV Intermittent every 8 hours  metroNIDAZOLE  IVPB          PHYSICAL EXAM:  GENERAL: NAD, well-appearing  CHEST/LUNG: Clear to auscultation bilaterally  HEART: Regular rate and rhythm  ABDOMEN: Soft, Nontender, Nondistended;   EXTREMITIES:  No clubbing, cyanosis, or edema      LABS  Labs:  CAPILLARY BLOOD GLUCOSE                              8.4    18.90 )-----------( 466      ( 28 Sep 2019 06:09 )             27.3       Auto Neutrophil %: 73.4 % (09-28-19 @ 06:09)  Auto Immature Granulocyte %: 12.6 % (09-28-19 @ 06:09)    09-28    138  |  105  |  10  ----------------------------<  107<H>  4.2   |  25  |  0.6<L>      Calcium, Total Serum: 7.4 mg/dL (09-28-19 @ 06:09)      LFTs:       ABG - ( 23 Sep 2019 02:00 )  pH: 7.55  /  pCO2: 44    /  pO2: 89    / HCO3: 38    / Base Excess: 14.0  /  SaO2: 97              ABG - ( 22 Sep 2019 13:31 )  pH: 7.55  /  pCO2: 40    /  pO2: 70    / HCO3: 35    / Base Excess: 11.5  /  SaO2: 95              RADIOLOGY & ADDITIONAL TESTS:  < from: CT Abdomen and Pelvis No Cont (09.28.19 @ 11:28) >  IMPRESSION:   Since September 20, 2019:    1. Post hysterectomy and bilateral salpingo-oophorectomy. Pelvic   irregularly-shaped approximate 7 x 3.2 cm possible collection containing   air and debris. CT abdomen and pelvis with IV and oral contrast may be   helpful to differentiate abscess from small bowel.    2. Post colectomy with ileorectal anastomosis. Decreased diffuse small   bowel distention, without evidence of bowel obstruction.    3. Stable large hyperattenuating right lower quadrant intraperitoneal   structure with gas may represent hematoma-possibly infected versus   surgical hemostatic material.     < end of copied text > GENERAL SURGERY PROGRESS NOTE     MARCELLUS WILSON  49y  Female  Hospital day :12d  POD:  Procedure: Lysis, adhesions, pelvis, laparoscopic  Cystoscopy, diagnostic  Laparoscopic total hysterectomy with salpingo-oophorectomy for uterus greater than 250 grams    OVERNIGHT EVENTS: No acute events. Got CT yesterday but IV blew --> non contrast limited study (possible collection but require contrast to differentiate abdominal/pelvic structures from abscesses)     T(F): 98.8 (09-28-19 @ 23:00), Max: 100.4 (09-28-19 @ 20:05)  HR: 93 (09-28-19 @ 23:00) (93 - 104)  BP: 118/57 (09-28-19 @ 23:00) (115/56 - 130/64)  ABP: --  ABP(mean): --  RR: 18 (09-28-19 @ 23:00) (18 - 18)  SpO2: --    DIET/FLUIDS: dextrose 5% + sodium chloride 0.9% with potassium chloride 20 mEq/L 1000 milliLiter(s) IV Continuous <Continuous>      URINE:   09-27-19 @ 07:01  -  09-28-19 @ 07:00  --------------------------------------------------------  OUT: 1 mL       GI proph:  pantoprazole  Injectable 40 milliGRAM(s) IV Push daily    AC/ proph:   ABx: levoFLOXacin IVPB 750 milliGRAM(s) IV Intermittent every 24 hours  levoFLOXacin IVPB      metroNIDAZOLE  IVPB 500 milliGRAM(s) IV Intermittent every 8 hours  metroNIDAZOLE  IVPB          PHYSICAL EXAM:  GENERAL: NAD, well-appearing  CHEST/LUNG: Clear to auscultation bilaterally  HEART: Regular rate and rhythm  ABDOMEN: Soft, , Nondistended; Tender Right side  EXTREMITIES:  No clubbing, cyanosis, or edema      LABS  Labs:  CAPILLARY BLOOD GLUCOSE                              8.4    18.90 )-----------( 466      ( 28 Sep 2019 06:09 )             27.3       Auto Neutrophil %: 73.4 % (09-28-19 @ 06:09)  Auto Immature Granulocyte %: 12.6 % (09-28-19 @ 06:09)    09-28    138  |  105  |  10  ----------------------------<  107<H>  4.2   |  25  |  0.6<L>      Calcium, Total Serum: 7.4 mg/dL (09-28-19 @ 06:09)      LFTs:       ABG - ( 23 Sep 2019 02:00 )  pH: 7.55  /  pCO2: 44    /  pO2: 89    / HCO3: 38    / Base Excess: 14.0  /  SaO2: 97              ABG - ( 22 Sep 2019 13:31 )  pH: 7.55  /  pCO2: 40    /  pO2: 70    / HCO3: 35    / Base Excess: 11.5  /  SaO2: 95              RADIOLOGY & ADDITIONAL TESTS:  < from: CT Abdomen and Pelvis No Cont (09.28.19 @ 11:28) >  IMPRESSION:   Since September 20, 2019:    1. Post hysterectomy and bilateral salpingo-oophorectomy. Pelvic   irregularly-shaped approximate 7 x 3.2 cm possible collection containing   air and debris. CT abdomen and pelvis with IV and oral contrast may be   helpful to differentiate abscess from small bowel.    2. Post colectomy with ileorectal anastomosis. Decreased diffuse small   bowel distention, without evidence of bowel obstruction.    3. Stable large hyperattenuating right lower quadrant intraperitoneal   structure with gas may represent hematoma-possibly infected versus   surgical hemostatic material.     < end of copied text >

## 2019-09-29 NOTE — PROGRESS NOTE ADULT - SUBJECTIVE AND OBJECTIVE BOX
PGY 3 Note    Patient examined at bedside, pain well controlled, not taking pain meds. Denies fevers/chills, HA/N/V, CP/SOB/palpitations, N/V, vaginal bleeding, hematuria/dysuria, constipation/diarrhea. Tolerating full liquids, passing flatus, having daily BM, last one last night. Voiding well, ambulating. Using incentive spirometry.   Pt continues to be frustrated at hospital stay, became tearful this morning. Pt states she needs a break but had a very good night.      Physical exam  T(F): 99.5 (09-29-19 @ 07:21), Max: 100.4 (09-28-19 @ 20:05)  HR: 89 (09-29-19 @ 07:21) (84 - 104)  BP: 129/69 (09-29-19 @ 07:21) (118/57 - 130/64)  RR: 18 (09-29-19 @ 07:21) (18 - 18)    General: AAOx3. NAD  CVS: RRR. Nl S1S2  Lungs: CTAB  Abdomen: soft, non-tender, non-distended, +BSx4  Incision: C/D/I, no erythema, no draining, non tender  VE: deferred, no bleeding on pad/chux  Ext: No edema. SCDs in place    Labs:             8.4<L>  18.90<H> )-----------( 466<H>    ( 09-28 @ 06:09 )             27.3<L>               8.3<L>  16.46<H> )-----------( 442<H>    ( 09-27 @ 06:13 )             27.2<L>               8.8<L>  15.87<H> )-----------( 457<H>    ( 09-26 @ 09:46 )             29.2<L>               8.4<L>  16.44<H> )-----------( 401<H>    ( 09-25 @ 08:41 )             27.8<L>     09-28    138  |  105  |  10  ----------------------------<  107<H>  4.2   |  25  |  0.6<L>    Ca    7.4<L>      28 Sep 2019 06:09  Phos  3.2     09-28  Mg     1.7     09-28    Medications:  MEDICATIONS  (STANDING):  benzocaine 20% Spray 1 Spray(s) Topical every 4 hours  dextrose 5% + sodium chloride 0.9% with potassium chloride 20 mEq/L 1000 milliLiter(s) (125 mL/Hr) IV Continuous <Continuous>  enoxaparin Injectable 40 milliGRAM(s) SubCutaneous daily  levoFLOXacin IVPB 750 milliGRAM(s) IV Intermittent every 24 hours  levoFLOXacin IVPB      metroNIDAZOLE  IVPB 500 milliGRAM(s) IV Intermittent every 8 hours  metroNIDAZOLE  IVPB      pantoprazole  Injectable 40 milliGRAM(s) IV Push daily    MEDICATIONS  (PRN):  acetaminophen   Tablet .. 650 milliGRAM(s) Oral every 6 hours PRN Temp greater or equal to 38C (100.4F), Mild Pain (1 - 3), Moderate Pain (4 - 6)  ibuprofen  Tablet. 600 milliGRAM(s) Oral every 6 hours PRN Mild Pain (1 - 3), Moderate Pain (4 - 6)  ondansetron Injectable 8 milliGRAM(s) IV Push every 8 hours PRN Nausea and/or Vomiting    Imaging  < from: CT Abdomen and Pelvis No Cont (09.28.19 @ 11:28) >  IMPRESSION:   Since September 20, 2019:    1. Post hysterectomy and bilateral salpingo-oophorectomy. Pelvic   irregularly-shaped approximate 7 x 3.2 cm possible collection containing   air and debris. CT abdomen and pelvis with IV and oral contrast may be   helpful to differentiate abscess from small bowel.    2. Post colectomy with ileorectal anastomosis. Decreased diffuse small   bowel distention, without evidence of bowel obstruction.    3. Stable large hyperattenuating right lower quadrant intraperitoneal   structure with gas may represent hematoma-possibly infected versus   surgical hemostatic material.     < end of copied text >

## 2019-09-30 LAB
ANISOCYTOSIS BLD QL: SLIGHT — SIGNIFICANT CHANGE UP
BASOPHILS # BLD AUTO: 0 K/UL — SIGNIFICANT CHANGE UP (ref 0–0.2)
BASOPHILS NFR BLD AUTO: 0 % — SIGNIFICANT CHANGE UP (ref 0–1)
BURR CELLS BLD QL SMEAR: PRESENT — SIGNIFICANT CHANGE UP
EOSINOPHIL # BLD AUTO: 0 K/UL — SIGNIFICANT CHANGE UP (ref 0–0.7)
EOSINOPHIL NFR BLD AUTO: 0 % — SIGNIFICANT CHANGE UP (ref 0–8)
GIANT PLATELETS BLD QL SMEAR: PRESENT — SIGNIFICANT CHANGE UP
HCT VFR BLD CALC: 34 % — LOW (ref 37–47)
HGB BLD-MCNC: 10.6 G/DL — LOW (ref 12–16)
LYMPHOCYTES # BLD AUTO: 0.89 K/UL — LOW (ref 1.2–3.4)
LYMPHOCYTES # BLD AUTO: 2.6 % — LOW (ref 20.5–51.1)
MANUAL SMEAR VERIFICATION: SIGNIFICANT CHANGE UP
MCHC RBC-ENTMCNC: 25.2 PG — LOW (ref 27–31)
MCHC RBC-ENTMCNC: 31.2 G/DL — LOW (ref 32–37)
MCV RBC AUTO: 80.8 FL — LOW (ref 81–99)
METAMYELOCYTES # FLD: 2.6 % — HIGH (ref 0–0)
MONOCYTES # BLD AUTO: 1.47 K/UL — HIGH (ref 0.1–0.6)
MONOCYTES NFR BLD AUTO: 4.3 % — SIGNIFICANT CHANGE UP (ref 1.7–9.3)
NEUTROPHILS # BLD AUTO: 31 K/UL — HIGH (ref 1.4–6.5)
NEUTROPHILS NFR BLD AUTO: 83.7 % — HIGH (ref 42.2–75.2)
NEUTS BAND # BLD: 6.8 % — HIGH (ref 0–6)
PLAT MORPH BLD: NORMAL — SIGNIFICANT CHANGE UP
PLATELET # BLD AUTO: 783 K/UL — HIGH (ref 130–400)
POIKILOCYTOSIS BLD QL AUTO: SIGNIFICANT CHANGE UP
POLYCHROMASIA BLD QL SMEAR: SIGNIFICANT CHANGE UP
RBC # BLD: 4.21 M/UL — SIGNIFICANT CHANGE UP (ref 4.2–5.4)
RBC # FLD: 19.4 % — HIGH (ref 11.5–14.5)
RBC BLD AUTO: ABNORMAL
WBC # BLD: 34.25 K/UL — HIGH (ref 4.8–10.8)
WBC # FLD AUTO: 34.25 K/UL — HIGH (ref 4.8–10.8)

## 2019-09-30 PROCEDURE — 99232 SBSQ HOSP IP/OBS MODERATE 35: CPT | Mod: 57

## 2019-09-30 PROCEDURE — 99024 POSTOP FOLLOW-UP VISIT: CPT

## 2019-09-30 RX ORDER — CEFEPIME 1 G/1
INJECTION, POWDER, FOR SOLUTION INTRAMUSCULAR; INTRAVENOUS
Refills: 0 | Status: DISCONTINUED | OUTPATIENT
Start: 2019-09-30 | End: 2019-10-01

## 2019-09-30 RX ORDER — VANCOMYCIN HCL 1 G
1500 VIAL (EA) INTRAVENOUS ONCE
Refills: 0 | Status: COMPLETED | OUTPATIENT
Start: 2019-09-30 | End: 2019-09-30

## 2019-09-30 RX ORDER — CEFEPIME 1 G/1
2000 INJECTION, POWDER, FOR SOLUTION INTRAMUSCULAR; INTRAVENOUS ONCE
Refills: 0 | Status: COMPLETED | OUTPATIENT
Start: 2019-09-30 | End: 2019-09-30

## 2019-09-30 RX ORDER — CEFEPIME 1 G/1
2000 INJECTION, POWDER, FOR SOLUTION INTRAMUSCULAR; INTRAVENOUS EVERY 8 HOURS
Refills: 0 | Status: DISCONTINUED | OUTPATIENT
Start: 2019-09-30 | End: 2019-10-01

## 2019-09-30 RX ORDER — DIPHENHYDRAMINE HCL 50 MG
25 CAPSULE ORAL ONCE
Refills: 0 | Status: DISCONTINUED | OUTPATIENT
Start: 2019-09-30 | End: 2019-10-01

## 2019-09-30 RX ORDER — VANCOMYCIN HCL 1 G
VIAL (EA) INTRAVENOUS
Refills: 0 | Status: DISCONTINUED | OUTPATIENT
Start: 2019-09-30 | End: 2019-10-01

## 2019-09-30 RX ORDER — VANCOMYCIN HCL 1 G
1250 VIAL (EA) INTRAVENOUS EVERY 12 HOURS
Refills: 0 | Status: DISCONTINUED | OUTPATIENT
Start: 2019-10-01 | End: 2019-10-01

## 2019-09-30 RX ADMIN — Medication 650 MILLIGRAM(S): at 11:39

## 2019-09-30 RX ADMIN — Medication 650 MILLIGRAM(S): at 02:54

## 2019-09-30 RX ADMIN — Medication 600 MILLIGRAM(S): at 17:36

## 2019-09-30 RX ADMIN — Medication 650 MILLIGRAM(S): at 17:36

## 2019-09-30 RX ADMIN — ENOXAPARIN SODIUM 40 MILLIGRAM(S): 100 INJECTION SUBCUTANEOUS at 11:39

## 2019-09-30 RX ADMIN — Medication 100 MILLIGRAM(S): at 21:32

## 2019-09-30 RX ADMIN — Medication 100 MILLIGRAM(S): at 13:57

## 2019-09-30 RX ADMIN — Medication 650 MILLIGRAM(S): at 17:35

## 2019-09-30 RX ADMIN — CEFEPIME 100 MILLIGRAM(S): 1 INJECTION, POWDER, FOR SOLUTION INTRAMUSCULAR; INTRAVENOUS at 21:32

## 2019-09-30 RX ADMIN — Medication 600 MILLIGRAM(S): at 11:34

## 2019-09-30 RX ADMIN — Medication 600 MILLIGRAM(S): at 17:35

## 2019-09-30 RX ADMIN — Medication 666.67 MILLIGRAM(S): at 17:30

## 2019-09-30 RX ADMIN — PANTOPRAZOLE SODIUM 40 MILLIGRAM(S): 20 TABLET, DELAYED RELEASE ORAL at 11:38

## 2019-09-30 RX ADMIN — Medication 650 MILLIGRAM(S): at 11:34

## 2019-09-30 RX ADMIN — CEFEPIME 100 MILLIGRAM(S): 1 INJECTION, POWDER, FOR SOLUTION INTRAMUSCULAR; INTRAVENOUS at 17:36

## 2019-09-30 RX ADMIN — Medication 100 MILLIGRAM(S): at 05:32

## 2019-09-30 NOTE — PROGRESS NOTE ADULT - ASSESSMENT
50yo P1 h/o UC with total colectomy, h/o DCIS s/p lumpectomy with radiation, h/o unprovoked PE found to have prothrombin mutation (heterozygous), with fibroid uterus and pelvic pain s/p RATLH, BSO, cystoscopy, EBL 250cc, complicated by postoperative ileus vs SBO, recurrent temps,  POD #13  GI: consider advancing diet, will discuss with surgery  DVT prophylaxis: lovenox, ambulation  Neuro: pain control  Cardio: no dx  Pulm: incentive spirometer and ambulation  ID: persistently elevated temps and increasing WBC, repeat BCx, UCx, and cath tip from midline cultures pending, current abx IV levaquin and flagyl per ID, will discuss with ID, has CT abd/pelvis read pending, consider IR drainage of collection  Endo: no dx  : urinating, no vaginal discharge or bleeding  Heme: H/H stable, VSS    Plan: monitor temps, f/u imaging, consider IR drainage of collection, discuss diet with surgery and abx regimen with ID    Dr Wayne to be made aware

## 2019-09-30 NOTE — CHART NOTE - NSCHARTNOTEFT_GEN_A_CORE
Registered Dietitian Follow-Up     Patient Profile Reviewed                           Yes [x]   No []     Nutrition History Previously Obtained        Yes [x]  No []       Pertinent Medical Interventions: Now has bowel function but has been febrile with increasing WBC.  FU CT A/P w/ PO & IV contrast read, possible IR consult for drainage if amenable; plan for image-guided abscess drainage tomorrow, 10/1.    Diet order: Full liquids--pt tolerating well at this time, consuming all of her meal trays w/o any issues.      Anthropometrics:  - Ht. 65"  - Wt. no new wts   - %wt change  - BMI: 25.0  - IBW: 125#      Pertinent Lab Data: Reviewed (9/29): BUN 8, Cr. 0.6, Glu 136     Pertinent Meds: lovenox, abx, D5NS+KCl+KCl, protonix, zofran     Physical Findings:  - Appearance: alert and oriented; 1+ B/L foot and 2+ edema to R arm  - GI function: LBM 9/30  - Tubes: none   - Oral/Mouth cavity: denies difficulty swallowing/chewing  - Skin: surgical incision      Nutrition Requirements  Weight Used: CBW: 150#/68kg; needs continued from RD note on 9/23      Calories: 3070-8635 kcal/day (MSJ x 1.2-1.3 AF)  Protein: 68-82 gm/day (1-1.2 gm/kg CBW)  Fluid: 1 ml/kcal    Nutrient Intake: not meeting kcal/pro needs at this time      Previous Nutrition Diagnosis: Inadequate protein-energy intake (ongoing)      Nutrition Intervention: meals and snacks, medical food supplements    Recommendations:  1. Monitor Surgery/GYN POC and advance diet per their recs when medically feasible + vegetarian (fish allowed) diet modifier  2. Order Ensure Enlive+ Prosource Gelatein both once daily      Goal/Expected Outcome: Pt's diet to be advanced and pt to consume >50% of meals, snacks, and supplements within 3 days      Indicator/Monitoring: RD to monitor diet order, energy intake, glucose profile, nutrition focused physical findings

## 2019-09-30 NOTE — PROGRESS NOTE ADULT - ASSESSMENT
49F w/ PMH of breast cancer, PE, and total colectomy for UC s/p laparoscopic total hysterectomy with salpingo-oophorectomy on 9/17 with ileus vs. obstruction. Now has bowel function but has been febrile with increasing WBC.     Plan:   - FU CT A/P w/ PO & IV contrast read, possible IR consult for drainage if amenable  - trend wbc   - monitor fevers   - evaluate Abx regiment (on metronidazole and levaquin)

## 2019-09-30 NOTE — PROGRESS NOTE ADULT - SUBJECTIVE AND OBJECTIVE BOX
GENERAL SURGERY PROGRESS NOTE     MARCELLUS WILSON  49y  Female  Hospital day :13d  POD:  Procedure: Lysis, adhesions, pelvis, laparoscopic  Cystoscopy, diagnostic  Laparoscopic total hysterectomy with salpingo-oophorectomy for uterus greater than 250 grams    OVERNIGHT EVENTS:  Febrile to 103.2 F overnight. Receiving APAP Q6H. Tachycardic to 121. Feels very weak.     T(F): 101.9 (09-30-19 @ 04:30), Max: 103.2 (09-30-19 @ 03:00)  HR: 121 (09-30-19 @ 03:00) (89 - 121)  BP: 121/65 (09-30-19 @ 03:00) (121/65 - 132/62)  ABP: --  ABP(mean): --  RR: 19 (09-30-19 @ 03:00) (18 - 19)  SpO2: --    DIET/FLUIDS: dextrose 5% + sodium chloride 0.9% with potassium chloride 20 mEq/L 1000 milliLiter(s) IV Continuous <Continuous>  GI proph:  pantoprazole  Injectable 40 milliGRAM(s) IV Push daily    ABx: levoFLOXacin IVPB 750 milliGRAM(s) IV Intermittent every 24 hours  levoFLOXacin IVPB      metroNIDAZOLE  IVPB 500 milliGRAM(s) IV Intermittent every 8 hours  metroNIDAZOLE  IVPB          PHYSICAL EXAM:  GENERAL: NAD, weak appearing, lethargic  CHEST/LUNG: Clear to auscultation bilaterally  HEART: Regular rate and rhythm  ABDOMEN: Soft, Nontender, Nondistended;   EXTREMITIES:  No clubbing, cyanosis, or edema      LABS  Labs:  CAPILLARY BLOOD GLUCOSE                        8.0    20.14 )-----------( 468      ( 29 Sep 2019 09:07 )             25.9       Auto Neutrophil %: 75.5 % (09-29-19 @ 09:07)  Auto Immature Granulocyte %: 11.3 % (09-29-19 @ 09:07)    09-29    137  |  102  |  8<L>  ----------------------------<  136<H>  4.3   |  25  |  0.6<L>      Calcium, Total Serum: 7.5 mg/dL (09-29-19 @ 09:07)      LFTs:             4.8  | 0.3  | 31       ------------------[71      ( 29 Sep 2019 09:07 )  1.9  | x    | 21          RADIOLOGY & ADDITIONAL TESTS:  < from: CT Abdomen and Pelvis No Cont (09.28.19 @ 11:28) >  IMPRESSION:   Since September 20, 2019:    1. Post hysterectomy and bilateral salpingo-oophorectomy. Pelvic   irregularly-shaped approximate 7 x 3.2 cm possible collection containing   air and debris. CT abdomen and pelvis with IV and oral contrast may be   helpful to differentiate abscess from small bowel.    2. Post colectomy with ileorectal anastomosis. Decreased diffuse small   bowel distention, without evidence of bowel obstruction.    3. Stable large hyperattenuating right lower quadrant intraperitoneal   structure with gas may represent hematoma-possibly infected versus   surgical hemostatic material.     < end of copied text >

## 2019-09-30 NOTE — PROGRESS NOTE ADULT - SUBJECTIVE AND OBJECTIVE BOX
Pt seen at bedside. Spoke with her and her mother about her progress and the current situation.  Pt is currently being treated and followed by ID  for post hysterectomy abscess. WBC had been increasing steadily and she had been having low grade fevers intermittently reason why the decision was made to repeat CT abd/pelvis. The repeat imaging showed prior mass stable/increased suggestive of infection in right side and 6cm collection in vaginal cuff suggestive of infection. Overnight her wbc increased significantly and she had temp 103F. Given this intervention is necessary to drain the abscess. Currently she is hemodynamically stable and no acute abdomen. SPoke to patient and mother about IR drainage vs exlap and drainage of pelvic abscesses. Pt wants to go ahead with IR drainage. Spoke about high probability of IR drainage not working, of needing to do exlap regardless, of prolonged hospital stay given the fact that there is time needed to find out if IR drainage was enough and that we would guide on clinical factors as well as laboratory values; the need for repeat imaging before removing drain, and the chance that she decompensates in any given time, and that we would need to take her to the OR when and if that becomes the case; also discussed that exlap will be major surgery but most likely to be definitive for resolution of those abscesses, be able to explore all adjacent organs for injuries, but also complications of general anesthesia, injury to other sites, need for transfusion, postop pain, ileus. among others  Answered questions from mother and patient.

## 2019-09-30 NOTE — CONSULT NOTE ADULT - SUBJECTIVE AND OBJECTIVE BOX
INTERVENTIONAL RADIOLOGY CONSULT:     Procedure Requested: Abscess Drainage    HPI: 50yo P1 h/o UC with total colectomy, h/o DCIS s/p lumpectomy with radiation, h/o unprovoked PE found to have prothrombin mutation (heterozygous), with fibroid uterus and pelvic pain s/p RATLH, BSO, cystoscopy, EBL 250cc, complicated by postoperative ileus vs SBO, recurrent temps, POD #13      PAST MEDICAL & SURGICAL HISTORY:  OA (osteoarthritis)  Anxiety  Breast cancer: left breast cancer,, lumpectomy&amp;  radiation (~2017)  S/P colectomy: hx ulcerative colitis  Liver disease: &quot;twisted liver&quot;, hemangioma  MVP (mitral valve prolapse)  Bell palsy  Pulmonary embolism: &gt; 5 yrs ago, no current meds, pt is unsure of cause  Breast cancer: left side, lumpectomy  H/O colectomy: colostom&amp; reversal pt sttaes pouch is on the left side  S/P lumpectomy, left breast      MEDICATIONS  (STANDING):  benzocaine 20% Spray 1 Spray(s) Topical every 4 hours  dextrose 5% + sodium chloride 0.9% with potassium chloride 20 mEq/L 1000 milliLiter(s) (125 mL/Hr) IV Continuous <Continuous>  enoxaparin Injectable 40 milliGRAM(s) SubCutaneous daily  levoFLOXacin IVPB 750 milliGRAM(s) IV Intermittent every 24 hours  levoFLOXacin IVPB      metroNIDAZOLE  IVPB 500 milliGRAM(s) IV Intermittent every 8 hours  metroNIDAZOLE  IVPB      pantoprazole  Injectable 40 milliGRAM(s) IV Push daily    MEDICATIONS  (PRN):  acetaminophen   Tablet .. 650 milliGRAM(s) Oral every 6 hours PRN Temp greater or equal to 38C (100.4F), Mild Pain (1 - 3), Moderate Pain (4 - 6)  ibuprofen  Tablet. 600 milliGRAM(s) Oral every 6 hours PRN Mild Pain (1 - 3), Moderate Pain (4 - 6)  ondansetron Injectable 8 milliGRAM(s) IV Push every 8 hours PRN Nausea and/or Vomiting      Allergies    codeine (Vomiting; Nausea)  latex (Anaphylaxis)  penicillin (Other)    Intolerances        Social History:   Smoking: Yes [ ]  No [ ]   ______pk yrs  ETOH  Yes [ ]  No [ ]  Social [ ]  DRUGS:  Yes [ ]  No [ ]  if so what______________    FAMILY HISTORY:      Physical Exam:   Vital Signs Last 24 Hrs  T(C): 37.9 (30 Sep 2019 11:28), Max: 39.6 (30 Sep 2019 03:00)  T(F): 100.3 (30 Sep 2019 11:28), Max: 103.2 (30 Sep 2019 03:00)  HR: 115 (30 Sep 2019 08:02) (89 - 121)  BP: 111/68 (30 Sep 2019 08:02) (111/68 - 132/62)  BP(mean): --  RR: 18 (30 Sep 2019 08:02) (18 - 19)  SpO2: --    Labs:                         10.6   34.25 )-----------( 783      ( 30 Sep 2019 11:07 )             34.0     09-29    137  |  102  |  8<L>  ----------------------------<  136<H>  4.3   |  25  |  0.6<L>    Ca    7.5<L>      29 Sep 2019 09:07  Phos  2.8     09-29  Mg     1.9     09-29    TPro  4.8<L>  /  Alb  1.9<L>  /  TBili  0.3  /  DBili  x   /  AST  31  /  ALT  21  /  AlkPhos  71  09-29        Pertinent labs:                      10.6   34.25 )-----------( 783      ( 30 Sep 2019 11:07 )             34.0       09-29    137  |  102  |  8<L>  ----------------------------<  136<H>  4.3   |  25  |  0.6<L>    Ca    7.5<L>      29 Sep 2019 09:07  Phos  2.8     09-29  Mg     1.9     09-29    TPro  4.8<L>  /  Alb  1.9<L>  /  TBili  0.3  /  DBili  x   /  AST  31  /  ALT  21  /  AlkPhos  71  09-29    Radiology & Additional Studies:   < from: CT Abdomen and Pelvis w/ Oral Cont and w/ IV Cont (09.29.19 @ 21:08) >  IMPRESSION:      Post colectomy/ileorectal anastomosis.     Stable mildly distended bowel without bowel obstruction. Stable ascites   (since 9/28/2019      Previously reported air containing collection, right paracolic gutter   measures approximately 11 cm, compatible with abscess (series 601/24).    Additional approximate 6.8 x 2.9 cm air containing collection the region   of the vaginal cuff (series 3/702).    < end of copied text >    Radiology imaging reviewed.       ASSESSMENT/ PLAN:   50yo P1 h/o UC with total colectomy, h/o DCIS s/p lumpectomy with radiation, h/o unprovoked PE found to have prothrombin mutation (heterozygous), with fibroid uterus and pelvic pain s/p RATLH, BSO, POD #13  - CT images reviewed - abscess in the R hemiabdomen appreciated  - IR consulted for abscess drainage  - Plan for image-guided abscess drainage tomorrow, 10/1  - NPO past midnight    Thank you for the courtesy of this consult, please call [phone] with any further questions.

## 2019-09-30 NOTE — PROGRESS NOTE ADULT - SUBJECTIVE AND OBJECTIVE BOX
PGY 4 Note    Patient examined at bedside, febrile episodes overnight.  Denies feeling febrile at that time.  Denies pain, vaginal discharge/bleeding, urinary symptoms, nausea/vomiting.  Tolerating full liquid diet over the weekend, passing flatus. Ambulating. Using incentive spirometry.     T(F): 101 (09-30-19 @ 05:30), Max: 103.2 (09-30-19 @ 03:00)  HR: 121 (09-30-19 @ 03:00) (89 - 121)  BP: 121/65 (09-30-19 @ 03:00) (121/65 - 132/62)  RR: 19 (09-30-19 @ 03:00) (18 - 19)  SpO2: --    I&O's Summary    28 Sep 2019 07:01  -  29 Sep 2019 07:00  --------------------------------------------------------  IN: 0 mL / OUT: 250 mL / NET: -250 mL    29 Sep 2019 07:01  -  30 Sep 2019 06:18  --------------------------------------------------------  IN: 360 mL / OUT: 0 mL / NET: 360 mL      Physical Exam:  General: NAD  CVS: RRR. Nl S1S2  Lungs: CTAB  Abdomen: soft, non-tender, non-distended, hypoactive BSx4  Incision: C/D/I, no erythema, no draining  VE: deferred, no bleeding on pad/chux  Ext: No edema. No calf tenderness. SCDs in place    Labs:             8.0<L>  20.14<H> )-----------( 468<H>    ( 09-29 @ 09:07 )             25.9<L>               8.4<L>  18.90<H> )-----------( 466<H>    ( 09-28 @ 06:09 )             27.3<L>               8.3<L>  16.46<H> )-----------( 442<H>    ( 09-27 @ 06:13 )             27.2<L>               8.8<L>  15.87<H> )-----------( 457<H>    ( 09-26 @ 09:46 )             29.2<L>               8.4<L>  16.44<H> )-----------( 401<H>    ( 09-25 @ 08:41 )             27.8<L>     09-29    137  |  102  |  8<L>  ----------------------------<  136<H>  4.3   |  25  |  0.6<L>    Ca    7.5<L>      29 Sep 2019 09:07  Phos  2.8     09-29  Mg     1.9     09-29    TPro  4.8<L>  /  Alb  1.9<L>  /  TBili  0.3  /  DBili  x   /  AST  31  /  ALT  21  /  AlkPhos  71  09-29            Trend:             8.0<L>  20.14<H> )-----------( 468<H>    ( 09-29 @ 09:07 )             25.9<L>               8.4<L>  18.90<H> )-----------( 466<H>    ( 09-28 @ 06:09 )             27.3<L>               8.3<L>  16.46<H> )-----------( 442<H>    ( 09-27 @ 06:13 )             27.2<L>               8.8<L>  15.87<H> )-----------( 457<H>    ( 09-26 @ 09:46 )             29.2<L>               8.4<L>  16.44<H> )-----------( 401<H>    ( 09-25 @ 08:41 )             27.8<L>        Creatinine, Serum: 0.6 (09-29)  Creatinine, Serum: 0.6 (09-28)  Creatinine, Serum: 0.7 (09-27)  Creatinine, Serum: 0.7 (09-26)  Creatinine, Serum: 0.7 (09-25)  Creatinine, Serum: 0.8 (09-24)  Creatinine, Serum: 0.9 (09-23)  Creatinine, Serum: 1.1 (09-23)  Creatinine, Serum: 0.8 (09-21)  Creatinine, Serum: 0.7 (09-20)  Creatinine, Serum: 0.7 (09-19)  Creatinine, Serum: 0.9 (09-17)      Medications:  MEDICATIONS  (STANDING):  benzocaine 20% Spray 1 Spray(s) Topical every 4 hours  dextrose 5% + sodium chloride 0.9% with potassium chloride 20 mEq/L 1000 milliLiter(s) (125 mL/Hr) IV Continuous <Continuous>  enoxaparin Injectable 40 milliGRAM(s) SubCutaneous daily  levoFLOXacin IVPB 750 milliGRAM(s) IV Intermittent every 24 hours  levoFLOXacin IVPB      metroNIDAZOLE  IVPB 500 milliGRAM(s) IV Intermittent every 8 hours  metroNIDAZOLE  IVPB      pantoprazole  Injectable 40 milliGRAM(s) IV Push daily    MEDICATIONS  (PRN):  acetaminophen   Tablet .. 650 milliGRAM(s) Oral every 6 hours PRN Temp greater or equal to 38C (100.4F), Mild Pain (1 - 3), Moderate Pain (4 - 6)  ibuprofen  Tablet. 600 milliGRAM(s) Oral every 6 hours PRN Mild Pain (1 - 3), Moderate Pain (4 - 6)  ondansetron Injectable 8 milliGRAM(s) IV Push every 8 hours PRN Nausea and/or Vomiting

## 2019-09-30 NOTE — PROGRESS NOTE ADULT - ATTENDING COMMENTS
50yo female with PMHx/PSHx of total colectomy with primary anastomosis for UC s/p robotic total hysterectomy with salphino-oophorectomy, extensive NEMO 9/17 now with ileus versus obstruction. CT A/P from 9/20 demonstrates diffuse small bowel dilatation without transition point, large hyperattenuating right lower quadrant intraperitoneal structure with gas - hematoma versus hemostatic material, and extensive soft tissue emphysema. NG tube was placed. Patient states she is feeling well, +BM, +flatus. Denies nausea/vomiting, chest pain or SOB. Exam is soft, right sided tenderness, well-healing wounds, mildly distended, no rebound/guarding. Repeat CT A/P demonstrates 6x8cm collection in right abdomen. Recommend IR drainage. Continue antibiotics. Serial labs - leukocytosis to 21. Serial abdominal exams. Encourage ambulation/OOB. Primary care as per primary team.

## 2019-09-30 NOTE — PROVIDER CONTACT NOTE (OTHER) - ACTION/TREATMENT ORDERED:
Continue w/ tylenol and monitor VS. No new orders at this time.
MD notified of VS. Ok to give zofran early
Duplex of B/L LE performed today, awaiting results
EKG and duplex performed. Stat labs ordered
MD bartletted. Will order reglan
MD notified. Zofran adminsitered
retake T in half an hour, no further action at this time.
Ok TY.
admin Tylenol as ordered and recheck value in an hour

## 2019-10-01 ENCOUNTER — RESULT REVIEW (OUTPATIENT)
Age: 49
End: 2019-10-01

## 2019-10-01 LAB
ANION GAP SERPL CALC-SCNC: 9 MMOL/L — SIGNIFICANT CHANGE UP (ref 7–14)
BLD GP AB SCN SERPL QL: SIGNIFICANT CHANGE UP
BUN SERPL-MCNC: 16 MG/DL — SIGNIFICANT CHANGE UP (ref 10–20)
CALCIUM SERPL-MCNC: 7.2 MG/DL — LOW (ref 8.5–10.1)
CHLORIDE SERPL-SCNC: 103 MMOL/L — SIGNIFICANT CHANGE UP (ref 98–110)
CO2 SERPL-SCNC: 21 MMOL/L — SIGNIFICANT CHANGE UP (ref 17–32)
CREAT SERPL-MCNC: 0.6 MG/DL — LOW (ref 0.7–1.5)
CULTURE RESULTS: SIGNIFICANT CHANGE UP
GAS PNL BLDA: SIGNIFICANT CHANGE UP
GLUCOSE SERPL-MCNC: 114 MG/DL — HIGH (ref 70–99)
HCT VFR BLD CALC: 28.3 % — LOW (ref 37–47)
HGB BLD-MCNC: 9 G/DL — LOW (ref 12–16)
LACTATE SERPL-SCNC: 1.7 MMOL/L — SIGNIFICANT CHANGE UP (ref 0.5–2.2)
MAGNESIUM SERPL-MCNC: 1.4 MG/DL — LOW (ref 1.8–2.4)
MCHC RBC-ENTMCNC: 25.2 PG — LOW (ref 27–31)
MCHC RBC-ENTMCNC: 31.8 G/DL — LOW (ref 32–37)
MCV RBC AUTO: 79.3 FL — LOW (ref 81–99)
NRBC # BLD: 0 /100 WBCS — SIGNIFICANT CHANGE UP (ref 0–0)
PHOSPHATE SERPL-MCNC: 3.7 MG/DL — SIGNIFICANT CHANGE UP (ref 2.1–4.9)
PLATELET # BLD AUTO: 759 K/UL — HIGH (ref 130–400)
POTASSIUM SERPL-MCNC: 5.1 MMOL/L — HIGH (ref 3.5–5)
POTASSIUM SERPL-SCNC: 5.1 MMOL/L — HIGH (ref 3.5–5)
RBC # BLD: 3.57 M/UL — LOW (ref 4.2–5.4)
RBC # FLD: 19.4 % — HIGH (ref 11.5–14.5)
SODIUM SERPL-SCNC: 133 MMOL/L — LOW (ref 135–146)
SPECIMEN SOURCE: SIGNIFICANT CHANGE UP
WBC # BLD: 40.91 K/UL — CRITICAL HIGH (ref 4.8–10.8)
WBC # FLD AUTO: 40.91 K/UL — CRITICAL HIGH (ref 4.8–10.8)

## 2019-10-01 PROCEDURE — 99233 SBSQ HOSP IP/OBS HIGH 50: CPT | Mod: 57

## 2019-10-01 PROCEDURE — 49204: CPT

## 2019-10-01 PROCEDURE — 99152 MOD SED SAME PHYS/QHP 5/>YRS: CPT

## 2019-10-01 PROCEDURE — 88304 TISSUE EXAM BY PATHOLOGIST: CPT | Mod: 26

## 2019-10-01 PROCEDURE — 10160 PNXR ASPIR ABSC HMTMA BULLA: CPT

## 2019-10-01 PROCEDURE — 77012 CT SCAN FOR NEEDLE BIOPSY: CPT | Mod: 26

## 2019-10-01 PROCEDURE — 88305 TISSUE EXAM BY PATHOLOGIST: CPT | Mod: 26

## 2019-10-01 RX ORDER — METOCLOPRAMIDE HCL 10 MG
10 TABLET ORAL EVERY 6 HOURS
Refills: 0 | Status: DISCONTINUED | OUTPATIENT
Start: 2019-10-01 | End: 2019-10-06

## 2019-10-01 RX ORDER — SODIUM CHLORIDE 9 MG/ML
1000 INJECTION INTRAMUSCULAR; INTRAVENOUS; SUBCUTANEOUS
Refills: 0 | Status: DISCONTINUED | OUTPATIENT
Start: 2019-10-01 | End: 2019-10-05

## 2019-10-01 RX ORDER — METRONIDAZOLE 500 MG
500 TABLET ORAL EVERY 8 HOURS
Refills: 0 | Status: DISCONTINUED | OUTPATIENT
Start: 2019-10-01 | End: 2019-10-06

## 2019-10-01 RX ORDER — ONDANSETRON 8 MG/1
4 TABLET, FILM COATED ORAL EVERY 6 HOURS
Refills: 0 | Status: COMPLETED | OUTPATIENT
Start: 2019-10-01 | End: 2019-10-04

## 2019-10-01 RX ORDER — SODIUM CHLORIDE 9 MG/ML
500 INJECTION INTRAMUSCULAR; INTRAVENOUS; SUBCUTANEOUS ONCE
Refills: 0 | Status: COMPLETED | OUTPATIENT
Start: 2019-10-01 | End: 2019-10-01

## 2019-10-01 RX ORDER — VANCOMYCIN HCL 1 G
1250 VIAL (EA) INTRAVENOUS EVERY 12 HOURS
Refills: 0 | Status: DISCONTINUED | OUTPATIENT
Start: 2019-10-02 | End: 2019-10-04

## 2019-10-01 RX ORDER — MEPERIDINE HYDROCHLORIDE 50 MG/ML
12.5 INJECTION INTRAMUSCULAR; INTRAVENOUS; SUBCUTANEOUS
Refills: 0 | Status: DISCONTINUED | OUTPATIENT
Start: 2019-10-01 | End: 2019-10-02

## 2019-10-01 RX ORDER — VANCOMYCIN HCL 1 G
1250 VIAL (EA) INTRAVENOUS ONCE
Refills: 0 | Status: COMPLETED | OUTPATIENT
Start: 2019-10-01 | End: 2019-10-01

## 2019-10-01 RX ORDER — ACETAMINOPHEN 500 MG
1000 TABLET ORAL ONCE
Refills: 0 | Status: COMPLETED | OUTPATIENT
Start: 2019-10-01 | End: 2019-10-01

## 2019-10-01 RX ORDER — VANCOMYCIN HCL 1 G
VIAL (EA) INTRAVENOUS
Refills: 0 | Status: DISCONTINUED | OUTPATIENT
Start: 2019-10-01 | End: 2019-10-04

## 2019-10-01 RX ORDER — SODIUM CHLORIDE 9 MG/ML
1000 INJECTION, SOLUTION INTRAVENOUS
Refills: 0 | Status: DISCONTINUED | OUTPATIENT
Start: 2019-10-01 | End: 2019-10-02

## 2019-10-01 RX ORDER — ONDANSETRON 8 MG/1
4 TABLET, FILM COATED ORAL ONCE
Refills: 0 | Status: DISCONTINUED | OUTPATIENT
Start: 2019-10-01 | End: 2019-10-02

## 2019-10-01 RX ORDER — ENOXAPARIN SODIUM 100 MG/ML
40 INJECTION SUBCUTANEOUS DAILY
Refills: 0 | Status: DISCONTINUED | OUTPATIENT
Start: 2019-10-02 | End: 2019-10-06

## 2019-10-01 RX ORDER — MAGNESIUM SULFATE 500 MG/ML
2 VIAL (ML) INJECTION
Refills: 0 | Status: COMPLETED | OUTPATIENT
Start: 2019-10-01 | End: 2019-10-01

## 2019-10-01 RX ORDER — PANTOPRAZOLE SODIUM 20 MG/1
40 TABLET, DELAYED RELEASE ORAL DAILY
Refills: 0 | Status: DISCONTINUED | OUTPATIENT
Start: 2019-10-02 | End: 2019-10-06

## 2019-10-01 RX ORDER — KETOROLAC TROMETHAMINE 30 MG/ML
30 SYRINGE (ML) INJECTION ONCE
Refills: 0 | Status: DISCONTINUED | OUTPATIENT
Start: 2019-10-01 | End: 2019-10-01

## 2019-10-01 RX ORDER — HYDROMORPHONE HYDROCHLORIDE 2 MG/ML
1 INJECTION INTRAMUSCULAR; INTRAVENOUS; SUBCUTANEOUS
Refills: 0 | Status: DISCONTINUED | OUTPATIENT
Start: 2019-10-01 | End: 2019-10-02

## 2019-10-01 RX ORDER — NALOXONE HYDROCHLORIDE 4 MG/.1ML
0.1 SPRAY NASAL
Refills: 0 | Status: DISCONTINUED | OUTPATIENT
Start: 2019-10-01 | End: 2019-10-06

## 2019-10-01 RX ORDER — KETOROLAC TROMETHAMINE 30 MG/ML
15 SYRINGE (ML) INJECTION ONCE
Refills: 0 | Status: DISCONTINUED | OUTPATIENT
Start: 2019-10-01 | End: 2019-10-01

## 2019-10-01 RX ORDER — HYDROMORPHONE HYDROCHLORIDE 2 MG/ML
30 INJECTION INTRAMUSCULAR; INTRAVENOUS; SUBCUTANEOUS
Refills: 0 | Status: DISCONTINUED | OUTPATIENT
Start: 2019-10-01 | End: 2019-10-02

## 2019-10-01 RX ORDER — HYDROMORPHONE HYDROCHLORIDE 2 MG/ML
0.5 INJECTION INTRAMUSCULAR; INTRAVENOUS; SUBCUTANEOUS
Refills: 0 | Status: DISCONTINUED | OUTPATIENT
Start: 2019-10-01 | End: 2019-10-02

## 2019-10-01 RX ORDER — CEFEPIME 1 G/1
2000 INJECTION, POWDER, FOR SOLUTION INTRAMUSCULAR; INTRAVENOUS EVERY 8 HOURS
Refills: 0 | Status: DISCONTINUED | OUTPATIENT
Start: 2019-10-01 | End: 2019-10-04

## 2019-10-01 RX ADMIN — Medication 100 MILLIGRAM(S): at 22:10

## 2019-10-01 RX ADMIN — Medication 100 MILLIGRAM(S): at 14:46

## 2019-10-01 RX ADMIN — Medication 166.67 MILLIGRAM(S): at 05:02

## 2019-10-01 RX ADMIN — SODIUM CHLORIDE 1000 MILLILITER(S): 9 INJECTION INTRAMUSCULAR; INTRAVENOUS; SUBCUTANEOUS at 22:39

## 2019-10-01 RX ADMIN — Medication 400 MILLIGRAM(S): at 18:25

## 2019-10-01 RX ADMIN — Medication 1000 MILLIGRAM(S): at 19:00

## 2019-10-01 RX ADMIN — CEFEPIME 100 MILLIGRAM(S): 1 INJECTION, POWDER, FOR SOLUTION INTRAMUSCULAR; INTRAVENOUS at 14:46

## 2019-10-01 RX ADMIN — Medication 100 MILLIGRAM(S): at 05:02

## 2019-10-01 RX ADMIN — HYDROMORPHONE HYDROCHLORIDE 30 MILLILITER(S): 2 INJECTION INTRAMUSCULAR; INTRAVENOUS; SUBCUTANEOUS at 23:25

## 2019-10-01 RX ADMIN — HYDROMORPHONE HYDROCHLORIDE 0.5 MILLIGRAM(S): 2 INJECTION INTRAMUSCULAR; INTRAVENOUS; SUBCUTANEOUS at 22:56

## 2019-10-01 RX ADMIN — Medication 50 GRAM(S): at 20:38

## 2019-10-01 RX ADMIN — SODIUM CHLORIDE 125 MILLILITER(S): 9 INJECTION INTRAMUSCULAR; INTRAVENOUS; SUBCUTANEOUS at 17:15

## 2019-10-01 RX ADMIN — Medication 166.67 MILLIGRAM(S): at 18:49

## 2019-10-01 RX ADMIN — Medication 50 GRAM(S): at 21:36

## 2019-10-01 RX ADMIN — Medication 30 MILLIGRAM(S): at 20:18

## 2019-10-01 RX ADMIN — Medication 30 MILLIGRAM(S): at 21:00

## 2019-10-01 RX ADMIN — CEFEPIME 100 MILLIGRAM(S): 1 INJECTION, POWDER, FOR SOLUTION INTRAMUSCULAR; INTRAVENOUS at 23:07

## 2019-10-01 RX ADMIN — CEFEPIME 100 MILLIGRAM(S): 1 INJECTION, POWDER, FOR SOLUTION INTRAMUSCULAR; INTRAVENOUS at 05:03

## 2019-10-01 NOTE — PROGRESS NOTE ADULT - ASSESSMENT
49F w/ PMH of breast cancer, PE, and total colectomy for UC s/p laparoscopic total hysterectomy with salpingo-oophorectomy on 9/17 with ileus vs. obstruction. Now has bowel function but has been febrile with increasing WBC (to 36)    Plan:   - Abx   - IR today

## 2019-10-01 NOTE — PROVIDER CONTACT NOTE (OTHER) - NAME OF MD/NP/PA/DO NOTIFIED:
MD Olmstead
Dr Olmstead
Erika
MD Jaime [phone]
MD Jaime [phone]
MD Olmstead
MD Olmstead
MD Olsen
MD martines
Md Singh [phone]

## 2019-10-01 NOTE — CHART NOTE - NSCHARTNOTEFT_GEN_A_CORE
Post Operative Check    Patient is post op from exploratory laparotomy with abdominal washout, lysis of adhesions, removal of retained fibroid x2. Patient tolerated the procedure well. She has no nausea or vomiting currently but her pain is severe. NGT output is minimal <20mL. Each ADONIS has approximately serosanguinous 50cc of output. She has made 30cc of urine in the past hour.       Vitals    T(C): 35.9 (10-01-19 @ 06:40), Max: 35.9 (09-30-19 @ 23:30)  HR: 102 (10-01-19 @ 06:40) (99 - 102)  BP: 124/62 (10-01-19 @ 06:40) (117/65 - 124/62)  RR: 18 (10-01-19 @ 06:40) (18 - 19)  SpO2: 98% (10-01-19 @ 06:40) (98% - 98%)  Wt(kg): --        Physical Exam  General: NAD AAOx3   Cards: RRR S1S2  Resp: CTAB  Abdomen: dressings dry and intact. ADONIS drains in situ with SS output. Abdomen is diffusely tender but worst in the midline.   : graves in situ and urine is concentrated   Ext: NTBL    Labs  Labs:  CAPILLARY BLOOD GLUCOSE                              9.0    40.91 )-----------( 759      ( 01 Oct 2019 17:30 )             28.3         10-01    133<L>  |  103  |  16  ----------------------------<  114<H>  5.1<H>   |  21  |  0.6<L>      Magnesium, Serum: 1.4 mg/dL (10-01-19 @ 17:30)      LFTs:     Lactate, Blood: 1.7 mmol/L (10-01-19 @ 17:30)  Blood Gas Arterial, Lactate: 2.1 mmoL/L (10-01-19 @ 16:17)    ABG - ( 01 Oct 2019 16:17 )  pH: 7.45  /  pCO2: 33    /  pO2: 91    / HCO3: 23    / Base Excess: -1.0  /  SaO2: 98                Culture - Catheter (collected 29 Sep 2019 18:00)  Source: .Catheter PICC Tip  Preliminary Report (01 Oct 2019 07:34):    No growth    Culture - Urine (collected 29 Sep 2019 09:29)  Source: .Urine Clean Catch (Midstream)  Final Report (01 Oct 2019 15:43):    >=3 organisms. Probable collection contamination.    Culture - Blood (collected 29 Sep 2019 09:07)  Source: .Blood Blood-Venous  Preliminary Report (30 Sep 2019 17:01):    No growth to date.          Radiology:  No post op studies.     Patient is a 49y old Female s/p exploratory laparotomy with abdominal washout, lysis of adhesions, removal of retained fibroid x2    Plan:   - increase fluids  - Pain management   - Monitor drain and NGT output   - DVT prophylaxis

## 2019-10-01 NOTE — BRIEF OPERATIVE NOTE - OPERATION/FINDINGS
2x intraabdominal mass in the right paracolic gutter. 2 intraabdominal mass in the right paracolic gutter.

## 2019-10-01 NOTE — PROGRESS NOTE ADULT - ASSESSMENT
48yo P1 h/o UC s/p total colectomy, h/o DCIS s/p lumpectomy and radiation, h/o unprovoked PE and prothombin gene mutation, hx of fibroids s/p RATLH BSO cystoscopy, vaginal laceration repair, EBL 250cc, POD 15, complicated by SBO and pelvic abscess, leukocytosis, recurrent fevers, s/p failed IR drainage, ex lap, removal of pelvic massx2 by gen surg, abdominal washout, EBL 50cc, upgraded to SICU due to hypotension and decreased UO in the OR for hemodynamic monitoring, POD 0 recovering well, currently hemodynamically and clinically stable    -NPO,   -R radial A line  -f/u cultures  -f/u NG, JPx2 (R right paracolic gutter, L pelvis) outpts  - dilaudid PCA to be started at SICU discretion  - ordered AM CXR, ,   - lovenox in AM   -am labs  -continue IV antibiotics as ordered  -management per SICU  -gyn following     Will inform Dr. Wayne

## 2019-10-01 NOTE — PRE-OP CHECKLIST - SELECT TESTS ORDERED
EKG/CBC/CMP/BMP/PT/PTT/INR/Hepatic Function EKG/PT/PTT/BMP/Hepatic Function/CBC/INR/Results in MD note/CMP

## 2019-10-01 NOTE — PROGRESS NOTE ADULT - ASSESSMENT
50yo P1 h/o ulcerative colitis with total colectomy, h/o DCIS s/p lumpectomy with radiation, h/o unprovoked PE found to have prothrombin mutation (heterozygous), with fibroid uterus and pelvic pain s/p RATLH, BSO, cystoscopy, EBL 250cc, complicated by postoperative ileus vs SBO (resolved), now with recurrent temps and pelvic mass, likely pelvic abscess  POD #14  -Plan for IR drainage this morning, if drainage fails, will go to OR for exploratory laparotomy  -NPO  -IV fluids    Discussed with Dr. Lee and Dr. Wayne

## 2019-10-01 NOTE — PROGRESS NOTE ADULT - ATTENDING COMMENTS
48yo female with PMHx/PSHx of total colectomy with primary anastomosis for UC s/p robotic total hysterectomy with salphino-oophorectomy, extensive NEMO 9/17 now with ileus versus obstruction. CT A/P from 9/20 demonstrates diffuse small bowel dilatation without transition point, large hyperattenuating right lower quadrant intraperitoneal structure with gas - hematoma versus hemostatic material, and extensive soft tissue emphysema. NG tube was placed. Patient states she is feeling well, +BM, +flatus. Denies nausea/vomiting, chest pain or SOB. Exam is soft, right sided tenderness, well-healing wounds, mildly distended, no rebound/guarding. Repeat CT A/P demonstrates 6x8cm collection in right abdomen. Underwent attempted IR drainage without success. Primary team planning for surgical exploration. Discussed with primary team that general surgery is available for assistance.  Recommend continue antibiotics. Serial labs - leukocytosis to 21 to 34. Serial abdominal exams. Encourage ambulation/OOB.

## 2019-10-01 NOTE — BRIEF OPERATIVE NOTE - NSICDXBRIEFPOSTOP_GEN_ALL_CORE_FT
POST-OP DIAGNOSIS:  Pelvic mass in female 01-Oct-2019 16:49:39  Phyllis Lee POST-OP DIAGNOSIS:  Postprocedural intraabdominal abscess 01-Oct-2019 17:29:52  Carola Houston  Abdominal mass 01-Oct-2019 17:29:13  Carola Houston

## 2019-10-01 NOTE — BRIEF OPERATIVE NOTE - NSICDXBRIEFPROCEDURE_GEN_ALL_CORE_FT
PROCEDURES:  Abdominal washout 01-Oct-2019 16:31:04  Samuel Jones  Exploratory laparotomy 01-Oct-2019 16:30:23  Samuel Jones PROCEDURES:  Surgical removal of intra-abdominal or retroperitoneal mass greater than 10 cm in diameter 01-Oct-2019 17:28:37  Carola Houston  Abdominal washout 01-Oct-2019 16:31:04  Samuel Jones  Exploratory laparotomy 01-Oct-2019 16:30:23  Samuel Jones

## 2019-10-01 NOTE — PROVIDER CONTACT NOTE (OTHER) - SITUATION
Pt /65  T 103.2. Gave pt 2 tylenol will reassess in and hour
Pt recent vitals taken, with 98.7 temp, 118 hr and 134/83. Pt denies discomfort or chest pain. Complaining of nausea. Pt wants another dose of zofran not due until 2am
As per MD James, blood ordered is on hold for OR.
Notified Md of pts elevated tempof 100.2. MD stated to admin Tylenol as ordered and recheck value in an hour
Pt had another episode of emesis, 30cc bilious
Pt is feeling nauseous and just had an episode of emesis  approx 30cc. Zofran to be given
Pt ordered for CT ABD/Pelvis today. Refusing despite education, patient states "I'm not getting any more cat scans for the hundredth time! You people just want mw to take it to cover your asses!".
pt T 100.6F, , pt admitted she just drank some hot fluids

## 2019-10-01 NOTE — PROGRESS NOTE ADULT - SUBJECTIVE AND OBJECTIVE BOX
Chief Complaint:     HPI: Pt reports pain well controlled, no overnight events, no new fevers or chills overnight. She says that she feels tired, got some sleep through the night. Patient is set for IR drainage of her pelvic masses this morning. We attempted to draw labs twice yesterday, the first time only CBC was able to be drawn, the second time insufficient quantity was drawn for coags for IR procedure. She is a very difficult draw, can only draw blood from her feet.    ROS: Denies cardiovascular or respiratory symptoms    PAST MEDICAL & SURGICAL HISTORY:  OA (osteoarthritis)  Anxiety  Breast cancer: left breast cancer,, lumpectomy&amp;  radiation (~2017)  S/P colectomy: hx ulcerative colitis  Liver disease: &quot;twisted liver&quot;, hemangioma  MVP (mitral valve prolapse)  Bell palsy  Pulmonary embolism: &gt; 5 yrs ago, no current meds, pt is unsure of cause  Breast cancer: left side, lumpectomy  H/O colectomy: colostom&amp; reversal pt sttaes pouch is on the left side  S/P lumpectomy, left breast      Physical Exam  Vital Signs Last 24 Hrs  T(F): 96.6 (01 Oct 2019 04:27), Max: 100.8 (30 Sep 2019 12:28)  HR: 102 (01 Oct 2019 04:27) (99 - 119)  BP: 124/62 (01 Oct 2019 04:27) (111/68 - 132/58)  RR: 19 (01 Oct 2019 04:27) (18 - 19)    Physical exam:  General - AAOx3, appears fatigued  Heart - S1S2, RRR  Lungs - CTA BL  Abdomen:  - Soft, nontender, nondistended, BS+  - Well healing robotic incisions  Pelvis/Vagina - No bleeding  Extremities - No calf tenderness, no swelling    Labs:                        10.6   34.25 )-----------( 783      ( 30 Sep 2019 11:07 )             34.0                         8.0    20.14 )-----------( 468      ( 29 Sep 2019 09:07 )             25.9

## 2019-10-01 NOTE — CHART NOTE - NSCHARTNOTEFT_GEN_A_CORE
GYN transfer note    Patient is a 48 y/o P1 h/o ulcerative colitis s/p total colectomy, h/o DCIS s/p lumpectomy and radiation, h/o unprovoked PE and prothombin gene mutation, hx of fibroids s/p RATLH BSO cystoscopy, vaginal laceration repair on 9/17 EBL 250cc. Postop course complicated by nausea and vomiting, NG tube was placed, was presumed to have ileus vs bowel obstruction. Patient continued to have fevers and elevated WBC despite antibiotics (cefepime, vanco, flagyl). Blood cultures had no growth and Ucx <100,000 colonies. Midline in right arm removed and culture from tip of catheter sent. Had dupplex of right upper extremity that demonstrated superficial thrombophlebitis. On CT abd/pelvis was found to have 2 abscesses, one in the right paracolic gutter and the second above the vaginal cuff. Today IR attempted to drain abscesses but were unsuccessful. Patient taken to the OR on 10/1/2019 for ex lap and removal of 2 intra-abdominal abscesses (frozen necrotic fibroids). During surgery was hypotensive and oliguric despite IV fluids. Patient accepted to SICU for hemodynamic monitoring. Condition likely secondary to septic shock.     Complete sign out given by Dr. Lee to SICU team.

## 2019-10-01 NOTE — PRE-ANESTHESIA EVALUATION ADULT - NSANTHPMHFT_GEN_ALL_CORE
S/p hysterectomy on 9/17/19, has been in hospital since procedure, had p[ost op ileus, now developed increased white count, low grade fever, multiple abdominal collections, unable to be drained by IR this AM.    Patient has Right upper arm swelling 2/2 failed midline catheter, Right 18g EJ in situ    Left arm PRECAUTIONS 2/2 Left breast lumpectomy and radiation
medicine- cleared as intermediate risk

## 2019-10-01 NOTE — BRIEF OPERATIVE NOTE - NSICDXBRIEFPROCEDURE_GEN_ALL_CORE_FT
PROCEDURES:  Abdominal washout 01-Oct-2019 16:31:04  Samuel Jones  Surgical removal of intra-abdominal or retroperitoneal mass greater than 10 cm in diameter 01-Oct-2019 16:30:51  Samuel Jones  Lysis, adhesions, intestine 01-Oct-2019 16:30:31  Samuel Jones  Exploratory laparotomy 01-Oct-2019 16:30:23  Samuel Jones

## 2019-10-01 NOTE — PROGRESS NOTE ADULT - SUBJECTIVE AND OBJECTIVE BOX
Vascular & Interventional Radiology Pre-Procedure Note    Procedure Name: Abdominal abscess drainage with conscious sedation    HPI: HPI: 48yo P1 h/o UC with total colectomy, h/o DCIS s/p lumpectomy with radiation, h/o unprovoked PE found to have prothrombin mutation (heterozygous), with fibroid uterus and pelvic pain s/p RATLH, BSO    Allergies: codeine (Vomiting; Nausea)  latex (Anaphylaxis)  penicillin (Other)    Medications (Abx/Cardiac/Anticoagulation/Blood Products)    cefepime   IVPB: 100 mL/Hr IV Intermittent (09-30 @ 17:36)  cefepime   IVPB: 100 mL/Hr IV Intermittent (10-01 @ 05:03)  enoxaparin Injectable: 40 milliGRAM(s) SubCutaneous (09-30 @ 11:39)  levoFLOXacin IVPB: 100 mL/Hr IV Intermittent (09-29 @ 21:30)  metroNIDAZOLE  IVPB: 100 mL/Hr IV Intermittent (10-01 @ 05:02)  vancomycin  IVPB: 666.67 mL/Hr IV Intermittent (09-30 @ 17:30)  vancomycin  IVPB: 166.67 mL/Hr IV Intermittent (10-01 @ 05:02)    Data:    T(C): 35.9  HR: 102  BP: 124/62  RR: 18  SpO2: 98%    Exam  General: AAOx3, NAD  Radiology & Additional Studies: Radiology imaging reviewed.     Labs:   -WBC 34.25 / HgB 10.6 / Hct 34.0 / Plt 783  -Na 137 / Cl 102 / BUN 8 / Glucose 136  -K 4.3 / CO2 25 / Cr 0.6  -ALT 21 / Alk Phos 71 / T.Bili 0.3    EKG completed (date): 9/24/2019    Height/Weight: 5'5", 68 kg    Consentable: [x] Yes   [ ] No     Plan:   -49y Female presents for Abdominal abscess drainage with conscious sedation  -Risks/Benefits/alternatives explained with the patient and/or healthcare proxy and witnessed informed consent obtained.

## 2019-10-01 NOTE — CONSULT NOTE ADULT - ATTENDING COMMENTS
49 year old female s/p laparoscopic total hysterectomy with salpingo-oophorectomy on 9/17 with ileus  NGT placed, 2L out initially   keep npo  NG tube to low continuous suction  monitor output  will follow
PLAN:     Neurologic:   -Morphine PCA for pain management    Respiratory:     Cardiovascular:   -Hypotensive and tachycardic intraop despite pressors and IV fluids   -IV fluids if hypovolemic, if not plan is to place a central line and start pressors     Gastrointestinal/Nutrition:  -NG tube in place  -NPO  -Protonix daily for GI ppx  -Maintenance fluids LR 125cc/hr     Renal/Genitourinary:  -Crespo in place, f/u UO  -Baseline Cr 0.6   -Monitor electrolytes and replete if needed  -Small defect in vaginal cuff noted intraop, might have small vaginal discharge     Hematologic:   -SCDs, Lovenox  -Baseline H&H 10.6/34.0, f/u H&H    Infectious Disease:   -On cefepime 2 grams q8hrs, vancomycin 1250 mL q12hrs and flagyl 500 mg q8hrs per ID recommendation  -Latest cultures done on 9/29 midline catheter tip culture prelim neg, blood culture prelim neg, urine culture contaminated, f/u final BCx and catheter culture  -F/u cultures from ex lap on 10/1  -F/u pathology from ex lap on 10/1  -Tmax 103.5 @1200 on 9/19; Last temp: 9/30 100.8 @1228  -Baseline WBC 34.25, f/u WBC    Lines/Tubes:   -EJ peripheral line placed on 9/29   -R radial A line placed on 10/1  -Crespo catheter, f/u UO  -ADONIS drain x2 (R right paracolic gutter, L pelvis), f/u ouput    Endocrine:   -NPO, FS q6hrs    Disposition: SICU

## 2019-10-01 NOTE — CONSULT NOTE ADULT - SUBJECTIVE AND OBJECTIVE BOX
SICU Consultation Note  =====================================================  HPI: 48 yo female w/ PMHx of UC s/p total colectomy, DCIS s/p lumpectomy and radiation, h/o unprovoked PE and prothombin gene mutation (heterozygous) and w/ symptomatic uterine fibroids s/p RATLH BSO cystoscopy, vaginal laceration repair on 9/17, post operative period complicated by N/V, ileus vs SBO, s/p NG tube and fever of unknown origin, s/p exploratory laparotomy w/ removal of necrotic fibroids x2 (confirmed by frozen, 3 x 3 cm and 5 x 4 cm) and NEMO, POD#0, w/ intraoperative hypotension, fever, tachycardia and oligouria despite pressors and IV fluids, r/o sepsis. Complaining of pain, otherwise doing OK.     Surgery Information  OR time: 1.5 hour    EBL: 50cc    IV Fluids: 3000mL   Blood Products: none   UOP: 50cc (concentrated)        PAST MEDICAL & SURGICAL HISTORY:  OA (osteoarthritis)  Anxiety  Breast cancer: left breast cancer, lumpectomy;  radiation (~2017)  S/P colectomy: reversal with pouch on the left side per pt; hx ulcerative colitis  Liver disease: twisted liver, hemangioma  MVP (mitral valve prolapse)  Bell palsy  Pulmonary embolism: unprovoked, 5 yrs ago, no current meds    HOME MEDS:   None    ALLERGIES:   codeine (Vomiting; Nausea)  latex (Anaphylaxis)  penicillin (Other)    SH:   Advanced Directives: Presumed Full Code     ROS:    A ten-point review of systems was otherwise negative except as noted    CURRENT MEDICATIONS:   --------------------------------------------------------------------------------------  Neurologic Medications:  HYDROmorphone  Injectable 0.5 milliGRAM(s) IV Push every 10 minutes PRN Moderate Pain (4 - 6)  HYDROmorphone  Injectable 1 milliGRAM(s) IV Push every 10 minutes PRN Severe Pain (7 - 10)  ketorolac   Injectable 15 milliGRAM(s) IV Push once PRN Moderate Pain (4 - 6)  ketorolac   Injectable 30 milliGRAM(s) IV Push once PRN Moderate Pain (4 - 6)  meperidine     Injectable 12.5 milliGRAM(s) IV Push every 10 minutes PRN Shivering  metoclopramide Injectable 10 milliGRAM(s) IV Push every 6 hours PRN Nausea and/or Vomiting  ondansetron Injectable 4 milliGRAM(s) IV Push once PRN Postoperative Nausea and/or Vomiting  ondansetron Injectable 4 milliGRAM(s) IV Push once PRN Nausea and/or Vomiting    Gastrointestinal Medications:  lactated ringers. 1000 milliLiter(s) IV Continuous <Continuous>  sodium chloride 0.9%. 1000 milliLiter(s) IV Continuous <Continuous>    Other System Medications:  None  --------------------------------------------------------------------------------------    VITAL SIGNS:  --------------------------------------------------------------------------------------  ICU Vital Signs Last 24 Hrs  T(C): 35.9 (01 Oct 2019 06:40), Max: 37.2 (30 Sep 2019 19:00)  T(F): 96.6 (01 Oct 2019 06:40), Max: 99 (30 Sep 2019 19:00)  HR: 102 (01 Oct 2019 06:40) (99 - 114)  BP: 124/62 (01 Oct 2019 06:40) (117/65 - 124/62)  RR: 18 (01 Oct 2019 06:40) (18 - 19)  SpO2: 98% (01 Oct 2019 06:40) (98% - 98%)    Temps: Tmax 103.5 @1200 on 9/19  Last temp: 9/30 100.8 @1228    EXAM:  General/Neuro: Normal, NAD, alert, oriented x 3.  Respiratory: Lungs clear to auscultation, Normal expansion/effort.    Cardiovascular: Tachycardic (120 bpm manual) with regular rhythm, S1, S2. No peripheral edema.  GI: Abdomen soft, tender around the vertical incision, mildly distended. Dressing dry/intact/clean w/ abdominal binder over it. Right and left sided ADONIS drains. Nasogastric tube in place. Current Diet:  NPO  Tubes/Lines/Drains:  	[x] Peripheral IV: EJ placed on 9/29/19  	[] Central Venous Line     	[] R	[] L	[] IJ	[] Fem	[] SC        Type:	    Date Placed:   	[x] Arterial Line		[x] R	[] L	[] Fem	[x] Rad	[] Ax	Date Placed: 10/1/19  	[] PICC:         	[] Midline		[] Mediport           	[x] Urinary Catheter		Date Placed: 10/1/19  Extremities: Extremities warm, pink, well-perfused.    Derm: Good skin turgor, no skin breakdown.    : Crespo in place. No bleeding. No abnormal vaginal discharge.      LABS:  --------------------------------------------------------------------------------------  Labs:               10.6   34.25 )-----------( 783      ( 30 Sep 2019 11:07 )             34.0    LFTs:     Blood Gas Arterial, Lactate: 2.1 mmoL/L (10-01-19 @ 16:17)    ABG - ( 01 Oct 2019 16:17 )  pH: 7.45  /  pCO2: 33    /  pO2: 91    / HCO3: 23    / Base Excess: -1.0  /  SaO2: 98        Coags:    Culture - Catheter (collected 29 Sep 2019 18:00)  Source: Catheter Midline Tip  Preliminary Report (01 Oct 2019 07:34):    No growth    Culture - Urine (collected 29 Sep 2019 09:29)  Source: .Urine Clean Catch (Midstream)  Final Report (01 Oct 2019 15:43):    >=3 organisms. Probable collection contamination.    Culture - Blood (collected 29 Sep 2019 09:07)  Source: .Blood Blood-Venous  Preliminary Report (30 Sep 2019 17:01):    No growth to date.    --------------------------------------------------------------------------------------    9/23: @0030 8.86>10/31.7<451, 139/3.3/93/30/39/1.1<132, AST/ALT 19/15, Lactate 1.3, Mag 2.4, Phos 3.8, CK 1.7, CKMB 1.3, TropT <0.01, ABG pH 7.55, pCO2 44, pO2 89, sat 97%m, HCO 38, BE 14, Lactate 1.1, K 2.9 (replaced)  --> @1400 11.52>9.2/20.4<427, lactate 1.2, Mg 2.7. Phos 3.0, 148/3.2(replaced)/97/37/36/0.9<118, 9/24: @0200 10.78>8.5/27.27<349, Mag 2.8, Phos 3, 147/3(repleted)/102/33/29/0.8<1179/25: 16.4>8.4/27.8<401, 150/3.6/111/31/17/0.7<146, Mag 2.5, Phos 1.6 (replaced)    9/23: BCx final no growth  UCx final: 50,000-99,000 e.coli (pan-sensitive)  10,000-49,000 klebsielle pneumoniae (resistant to amp)    9/26: @1000 15.87>8.8/29.2<457, 146/3.5/110/24/13/0.7<136, Mag 2.2,  phos 2  9/27: @0613 16.46>8.3/27.2<442, 141/3.8/106/25/11/0.7<136, Mg 1.9, Ph 2.6  9/28: 18.90>8.4/27.3<466, 138/4.2/105/25/10/0.6<107m Mg 1.7(replaced), Phos 3.2  9/29: 20.14>8.0/25.9<468, 137/4.3/102/25/8/0.6<136, Mg 1.9, Phos 2.8, lactate 2.2  9/30: 34.25>10.6/34.0<783, @1800 coags drawn, quantity insufficient    IMAGING RESULTS:    9/19 CT angio: Consolidations within the right upper, middle, and lower lobes as well as the left lower lobe compatible with pneumonia in the appropriate clinical setting.No evidence of pulmonary embolism.  9/23 Duplex: Venous thrombosis of left peroneal vein branches.No evidence of deep venous thrombosis or superficial thrombophlebitis in right lower extremity.   9/23 CT Abdomen/Pelvis: HEPATOBILIARY: Numerous hepatic hypoattenuating lesions, mostly measuring higher than simple fluid in attenuation, largest measuring 3.3 cm in the left hepatic lobe. PELVIC ORGANS: Crespo catheter in the bladder with associated intraluminal air. Postsurgical changes of hysterectomy and bilateral salpingooophorectomy. PERITONEUM/ MESENTERY/ BOWEL: No findings to suggest bowel obstruction. Oral contrast present in the colon. Small amount of high density material in the pelvis, posterior to the vaginal cuff ikely representing small postoperative hemoperitoneum  9/23 CT Chest: Improved lower lobe predominant bilateral consolidations with new diffuse bilateral groundglass opacities. Findings are nonspecific and may be infectious or inflammatory etiology. New small bilateral pleural effusions. Enlarged mediastinal and bilateral hilar lymph nodes  9/24 CXR: worsening bilateral opacities  9/25-29 CXR: unchanged bilateral opacities   9/26 CT abd/pelvis: 1. Stable post hysterectomy findings pelvis, without any drainable collection. 2. Diffuse bilateral alveolar opacity/consolidation, with interval progression and upper lobe predominance. 3. Small bilateral pleural effusions.   9/29 CT abd/pelvis: Post colectomy/ileorectal anastomosis. Stable mildly distended bowel without bowel obstruction. Stable ascites (since 9/28/2019). Previously reported air containing collection, right paracolic gutter measures approximately 11 cm, compatible with abscess (series 601/24). Additional approximate 6.8 x 2.9 cm air containing collection the region of the vaginal cuff (series 3/702).      ASSESSMENT:  48 yo female w/ PMHx of UC s/p total colectomy, DCIS s/p lumpectomy and radiation, h/o unprovoked PE and prothombin gene mutation (heterozygous) and w/ symptomatic uterine fibroids s/p RATLH BSO cystoscopy, vaginal laceration repair on 9/17, post operative period complicated by N/V, ileus vs SBO, s/p NG tube and fever of unknown origin, s/p exploratory laparotomy w/ removal of necrotic fibroids x2 (confirmed by frozen, 3 x 3 cm and 5 x 4 cm) and NEMO, POD#0, w/ intraoperative hypotension, fever, tachycardia and oligouria despite pressors and IV fluids, r/o sepsis,     PLAN:   Neurologic:   Respiratory:   Cardiovascular:   Gastrointestinal/Nutrition:   Renal/Genitourinary:   Hematologic: SCDs,   Infectious Disease:   -On cefepime 2 grams q8hrs, vancomycin 1250 mL q12hrs and flagyl 500 mg q8hrs per ID recommendation  -Latest cultures done on 9/29 midline catheter tip culture prelim neg, blood culture prelim neg, urine culture contaminated, f/u final BCx and catheter culture  -F/u cultures from ex lap on 10/1  -F/u pathology from ex lap on 10/1  -Tmax 103.5 @1200 on 9/19; Last temp: 9/30 100.8 @1228    Lines/Tubes:   -EJ peripheral line placed on 9/29   -R radial A line placed on 10/1  -Crespo catheter, f/u UO  -ADONIS drain x2, f/u ouput    Endocrine: NPO, FS q6hrs    Disposition: SICU    Critical Care Diagnoses: Monitor for Sepsis SICU Consultation Note  =====================================================  HPI: 48 yo female w/ PMHx of UC s/p total colectomy, DCIS s/p lumpectomy and radiation, h/o unprovoked PE and prothombin gene mutation (heterozygous) and w/ symptomatic uterine fibroids s/p RATLH BSO cystoscopy, vaginal laceration repair on 9/17, post operative period complicated by N/V, ileus vs SBO, s/p NG tube and fever of unknown origin, s/p exploratory laparotomy w/ removal of necrotic fibroids x2 (confirmed by frozen, 3 x 3 cm and 5 x 4 cm) and NEMO, POD#0, w/ intraoperative hypotension, fever, tachycardia and oligouria despite pressors and IV fluids, r/o sepsis. Complaining of pain, otherwise doing OK.     Surgery Information  OR time: 1.5 hour    EBL: 50cc    IV Fluids: 3000mL   Blood Products: none   UOP: 50cc (concentrated)        PAST MEDICAL & SURGICAL HISTORY:  OA (osteoarthritis)  Anxiety  Breast cancer: left breast cancer, lumpectomy;  radiation (~2017)  S/P colectomy: reversal with pouch on the left side per pt; hx ulcerative colitis  Liver disease: twisted liver, hemangioma  MVP (mitral valve prolapse)  Bell palsy  Pulmonary embolism: unprovoked, 5 yrs ago, no current meds    HOME MEDS:   None    ALLERGIES:   codeine (Vomiting; Nausea)  latex (Anaphylaxis)  penicillin (Other)    SH:   Advanced Directives: Presumed Full Code     ROS:    A ten-point review of systems was otherwise negative except as noted    CURRENT MEDICATIONS:   --------------------------------------------------------------------------------------  Neurologic Medications:  HYDROmorphone  Injectable 0.5 milliGRAM(s) IV Push every 10 minutes PRN Moderate Pain (4 - 6)  HYDROmorphone  Injectable 1 milliGRAM(s) IV Push every 10 minutes PRN Severe Pain (7 - 10)  ketorolac   Injectable 15 milliGRAM(s) IV Push once PRN Moderate Pain (4 - 6)  ketorolac   Injectable 30 milliGRAM(s) IV Push once PRN Moderate Pain (4 - 6)  meperidine     Injectable 12.5 milliGRAM(s) IV Push every 10 minutes PRN Shivering  metoclopramide Injectable 10 milliGRAM(s) IV Push every 6 hours PRN Nausea and/or Vomiting  ondansetron Injectable 4 milliGRAM(s) IV Push once PRN Postoperative Nausea and/or Vomiting  ondansetron Injectable 4 milliGRAM(s) IV Push once PRN Nausea and/or Vomiting    Gastrointestinal Medications:  lactated ringers. 1000 milliLiter(s) IV Continuous <Continuous>  sodium chloride 0.9%. 1000 milliLiter(s) IV Continuous <Continuous>    Other System Medications:  None  --------------------------------------------------------------------------------------    VITAL SIGNS:  --------------------------------------------------------------------------------------  ICU Vital Signs Last 24 Hrs  T(C): 35.9 (01 Oct 2019 06:40), Max: 37.2 (30 Sep 2019 19:00)  T(F): 96.6 (01 Oct 2019 06:40), Max: 99 (30 Sep 2019 19:00)  HR: 102 (01 Oct 2019 06:40) (99 - 114)  BP: 124/62 (01 Oct 2019 06:40) (117/65 - 124/62)  RR: 18 (01 Oct 2019 06:40) (18 - 19)  SpO2: 98% (01 Oct 2019 06:40) (98% - 98%)    Temps: Tmax 103.5 @1200 on 9/19  Last temp: 9/30 100.8 @1228    EXAM:  General/Neuro: Normal, NAD, alert, oriented x 3.  Respiratory: Lungs clear to auscultation, Normal expansion/effort.    Cardiovascular: Tachycardic (120 bpm manual) with regular rhythm, S1, S2. No peripheral edema.  GI: Abdomen soft, tender around the vertical incision, mildly distended. Dressing dry/intact/clean w/ abdominal binder over it. Right and left sided ADONIS drains. Nasogastric tube in place. Current Diet:  NPO  Tubes/Lines/Drains:  	[x] Peripheral IV: EJ placed on 9/29/19  	[] Central Venous Line     	[] R	[] L	[] IJ	[] Fem	[] SC        Type:	    Date Placed:   	[x] Arterial Line		[x] R	[] L	[] Fem	[x] Rad	[] Ax	Date Placed: 10/1/19  	[] PICC:         	[] Midline		[] Mediport           	[x] Urinary Catheter		Date Placed: 10/1/19  Extremities: Extremities warm, pink, well-perfused.    Derm: Good skin turgor, no skin breakdown.    : Crespo in place. No bleeding. No abnormal vaginal discharge.      LABS:  --------------------------------------------------------------------------------------  Labs:               10.6   34.25 )-----------( 783      ( 30 Sep 2019 11:07 )             34.0    LFTs:     Blood Gas Arterial, Lactate: 2.1 mmoL/L (10-01-19 @ 16:17)    ABG - ( 01 Oct 2019 16:17 )  pH: 7.45  /  pCO2: 33    /  pO2: 91    / HCO3: 23    / Base Excess: -1.0  /  SaO2: 98        Coags:    Culture - Catheter (collected 29 Sep 2019 18:00)  Source: Catheter Midline Tip  Preliminary Report (01 Oct 2019 07:34):    No growth    Culture - Urine (collected 29 Sep 2019 09:29)  Source: .Urine Clean Catch (Midstream)  Final Report (01 Oct 2019 15:43):    >=3 organisms. Probable collection contamination.    Culture - Blood (collected 29 Sep 2019 09:07)  Source: .Blood Blood-Venous  Preliminary Report (30 Sep 2019 17:01):    No growth to date.    --------------------------------------------------------------------------------------    9/23: @0030 8.86>10/31.7<451, 139/3.3/93/30/39/1.1<132, AST/ALT 19/15, Lactate 1.3, Mag 2.4, Phos 3.8, CK 1.7, CKMB 1.3, TropT <0.01, ABG pH 7.55, pCO2 44, pO2 89, sat 97%m, HCO 38, BE 14, Lactate 1.1, K 2.9 (replaced)  --> @1400 11.52>9.2/20.4<427, lactate 1.2, Mg 2.7. Phos 3.0, 148/3.2(replaced)/97/37/36/0.9<118, 9/24: @0200 10.78>8.5/27.27<349, Mag 2.8, Phos 3, 147/3(repleted)/102/33/29/0.8<1179/25: 16.4>8.4/27.8<401, 150/3.6/111/31/17/0.7<146, Mag 2.5, Phos 1.6 (replaced)    9/23: BCx final no growth  UCx final: 50,000-99,000 e.coli (pan-sensitive)  10,000-49,000 klebsielle pneumoniae (resistant to amp)    9/26: @1000 15.87>8.8/29.2<457, 146/3.5/110/24/13/0.7<136, Mag 2.2,  phos 2  9/27: @0613 16.46>8.3/27.2<442, 141/3.8/106/25/11/0.7<136, Mg 1.9, Ph 2.6  9/28: 18.90>8.4/27.3<466, 138/4.2/105/25/10/0.6<107m Mg 1.7(replaced), Phos 3.2  9/29: 20.14>8.0/25.9<468, 137/4.3/102/25/8/0.6<136, Mg 1.9, Phos 2.8, lactate 2.2  9/30: 34.25>10.6/34.0<783, @1800 coags drawn, quantity insufficient    IMAGING RESULTS:    9/19 CT angio: Consolidations within the right upper, middle, and lower lobes as well as the left lower lobe compatible with pneumonia in the appropriate clinical setting.No evidence of pulmonary embolism.  9/23 Duplex: Venous thrombosis of left peroneal vein branches.No evidence of deep venous thrombosis or superficial thrombophlebitis in right lower extremity.   9/23 CT Abdomen/Pelvis: HEPATOBILIARY: Numerous hepatic hypoattenuating lesions, mostly measuring higher than simple fluid in attenuation, largest measuring 3.3 cm in the left hepatic lobe. PELVIC ORGANS: Crespo catheter in the bladder with associated intraluminal air. Postsurgical changes of hysterectomy and bilateral salpingooophorectomy. PERITONEUM/ MESENTERY/ BOWEL: No findings to suggest bowel obstruction. Oral contrast present in the colon. Small amount of high density material in the pelvis, posterior to the vaginal cuff ikely representing small postoperative hemoperitoneum  9/23 CT Chest: Improved lower lobe predominant bilateral consolidations with new diffuse bilateral groundglass opacities. Findings are nonspecific and may be infectious or inflammatory etiology. New small bilateral pleural effusions. Enlarged mediastinal and bilateral hilar lymph nodes  9/24 CXR: worsening bilateral opacities  9/25-29 CXR: unchanged bilateral opacities   9/26 CT abd/pelvis: 1. Stable post hysterectomy findings pelvis, without any drainable collection. 2. Diffuse bilateral alveolar opacity/consolidation, with interval progression and upper lobe predominance. 3. Small bilateral pleural effusions.   9/29 CT abd/pelvis: Post colectomy/ileorectal anastomosis. Stable mildly distended bowel without bowel obstruction. Stable ascites (since 9/28/2019). Previously reported air containing collection, right paracolic gutter measures approximately 11 cm, compatible with abscess (series 601/24). Additional approximate 6.8 x 2.9 cm air containing collection the region of the vaginal cuff (series 3/702).      ASSESSMENT:  48 yo female w/ PMHx of UC s/p total colectomy, DCIS s/p lumpectomy and radiation, h/o unprovoked PE and prothombin gene mutation (heterozygous) and w/ symptomatic uterine fibroids s/p RATLH BSO cystoscopy, vaginal laceration repair on 9/17, post operative period complicated by N/V, ileus vs SBO, s/p NG tube and fever of unknown origin, s/p exploratory laparotomy w/ removal of necrotic fibroids x2 (confirmed by frozen, 3 x 3 cm and 5 x 4 cm) and NEMO, POD#0, w/ intraoperative hypotension, fever, tachycardia and oligouria despite pressors and IV fluids, r/o sepsis,     PLAN:     Neurologic:   -Morphine PCA for pain management    Respiratory:   -    Cardiovascular:   -Hypotensive and tachycardic intraop despite pressors and IV fluids     Gastrointestinal/Nutrition:  -NG tube in place  -NPO     Renal/Genitourinary:  -Crespo in place, f/u UO  -Baseline Cr 0.6   -Monitor electrolytes and replete if needed    Hematologic: SCDs, Lovenox    Infectious Disease:   -On cefepime 2 grams q8hrs, vancomycin 1250 mL q12hrs and flagyl 500 mg q8hrs per ID recommendation  -Latest cultures done on 9/29 midline catheter tip culture prelim neg, blood culture prelim neg, urine culture contaminated, f/u final BCx and catheter culture  -F/u cultures from ex lap on 10/1  -F/u pathology from ex lap on 10/1  -Tmax 103.5 @1200 on 9/19; Last temp: 9/30 100.8 @1228    Lines/Tubes:   -EJ peripheral line placed on 9/29   -R radial A line placed on 10/1  -Crespo catheter, f/u UO  -ADONIS drain x2, f/u ouput    Endocrine: NPO, FS q6hrs    Disposition: SICU    Critical Care Diagnoses: Monitor for Sepsis SICU Consultation Note  =====================================================  HPI: 48 yo female w/ PMHx of UC s/p total colectomy, DCIS s/p lumpectomy and radiation, h/o unprovoked PE and prothombin gene mutation (heterozygous) and w/ symptomatic uterine fibroids s/p RATLH BSO cystoscopy, vaginal laceration repair on 9/17, post operative period complicated by N/V, ileus vs SBO, s/p NG tube and fever of unknown origin, s/p failed IR drainage of collection noted on CT, s/p exploratory laparotomy w/ removal of necrotic fibroids x2 (confirmed by frozen, 3 x 3 cm and 5 x 4 cm) and NEMO, POD#0, w/ intraoperative hypotension, fever, tachycardia and oligouria despite pressors and IV fluids, r/o sepsis. Complaining of pain, otherwise doing OK.     Surgery Information  OR time: 1.5 hour    EBL: 50cc    IV Fluids: 3000mL   Blood Products: none   UOP: 50cc (concentrated)        PAST MEDICAL & SURGICAL HISTORY:  OA (osteoarthritis)  Anxiety  Breast cancer: left breast cancer, lumpectomy;  radiation (~2017)  S/P colectomy: reversal with pouch on the left side per pt; hx ulcerative colitis  Liver disease: twisted liver, hemangioma  MVP (mitral valve prolapse)  Bell palsy  Pulmonary embolism: unprovoked, 5 yrs ago, no current meds    HOME MEDS:   None    ALLERGIES:   codeine (Vomiting; Nausea)  latex (Anaphylaxis)  penicillin (Other)    SH:   Advanced Directives: Presumed Full Code     ROS:    A ten-point review of systems was otherwise negative except as noted    CURRENT MEDICATIONS:   --------------------------------------------------------------------------------------  Neurologic Medications:  HYDROmorphone  Injectable 0.5 milliGRAM(s) IV Push every 10 minutes PRN Moderate Pain (4 - 6)  HYDROmorphone  Injectable 1 milliGRAM(s) IV Push every 10 minutes PRN Severe Pain (7 - 10)  ketorolac   Injectable 15 milliGRAM(s) IV Push once PRN Moderate Pain (4 - 6)  ketorolac   Injectable 30 milliGRAM(s) IV Push once PRN Moderate Pain (4 - 6)  meperidine     Injectable 12.5 milliGRAM(s) IV Push every 10 minutes PRN Shivering  metoclopramide Injectable 10 milliGRAM(s) IV Push every 6 hours PRN Nausea and/or Vomiting  ondansetron Injectable 4 milliGRAM(s) IV Push once PRN Postoperative Nausea and/or Vomiting  ondansetron Injectable 4 milliGRAM(s) IV Push once PRN Nausea and/or Vomiting    Gastrointestinal Medications:  lactated ringers. 1000 milliLiter(s) IV Continuous <Continuous>  sodium chloride 0.9%. 1000 milliLiter(s) IV Continuous <Continuous>    Other System Medications:  None  --------------------------------------------------------------------------------------    VITAL SIGNS:  --------------------------------------------------------------------------------------  ICU Vital Signs Last 24 Hrs  T(C): 35.9 (01 Oct 2019 06:40), Max: 37.2 (30 Sep 2019 19:00)  T(F): 96.6 (01 Oct 2019 06:40), Max: 99 (30 Sep 2019 19:00)  HR: 102 (01 Oct 2019 06:40) (99 - 114)  BP: 124/62 (01 Oct 2019 06:40) (117/65 - 124/62)  RR: 18 (01 Oct 2019 06:40) (18 - 19)  SpO2: 98% (01 Oct 2019 06:40) (98% - 98%)    Temps: Tmax 103.5 @1200 on 9/19  Last temp: 9/30 100.8 @1228    EXAM:  General/Neuro: Normal, NAD, alert, oriented x 3.  Respiratory: Lungs clear to auscultation, Normal expansion/effort.    Cardiovascular: Tachycardic (120 bpm manual) with regular rhythm, S1, S2. No peripheral edema.  GI: Abdomen soft, tender around the vertical incision, mildly distended. Dressing dry/intact/clean w/ abdominal binder over it. Right and left sided ADONIS drains. Nasogastric tube in place. Current Diet:  NPO  Tubes/Lines/Drains:  	[x] Peripheral IV: EJ placed on 9/29/19  	[] Central Venous Line     	[] R	[] L	[] IJ	[] Fem	[] SC        Type:	    Date Placed:   	[x] Arterial Line		[x] R	[] L	[] Fem	[x] Rad	[] Ax	Date Placed: 10/1/19  	[] PICC:         	[] Midline		[] Mediport           	[x] Urinary Catheter		Date Placed: 10/1/19  Extremities: Extremities warm, pink, well-perfused.    Derm: Good skin turgor, no skin breakdown.    : Crespo in place. No bleeding. No abnormal vaginal discharge.      LABS:  --------------------------------------------------------------------------------------  Labs:               10.6   34.25 )-----------( 783      ( 30 Sep 2019 11:07 )             34.0    LFTs:     Blood Gas Arterial, Lactate: 2.1 mmoL/L (10-01-19 @ 16:17)    ABG - ( 01 Oct 2019 16:17 )  pH: 7.45  /  pCO2: 33    /  pO2: 91    / HCO3: 23    / Base Excess: -1.0  /  SaO2: 98        Coags:    Culture - Catheter (collected 29 Sep 2019 18:00)  Source: Catheter Midline Tip  Preliminary Report (01 Oct 2019 07:34):    No growth    Culture - Urine (collected 29 Sep 2019 09:29)  Source: .Urine Clean Catch (Midstream)  Final Report (01 Oct 2019 15:43):    >=3 organisms. Probable collection contamination.    Culture - Blood (collected 29 Sep 2019 09:07)  Source: .Blood Blood-Venous  Preliminary Report (30 Sep 2019 17:01):    No growth to date.    --------------------------------------------------------------------------------------    9/23: @0030 8.86>10/31.7<451, 139/3.3/93/30/39/1.1<132, AST/ALT 19/15, Lactate 1.3, Mag 2.4, Phos 3.8, CK 1.7, CKMB 1.3, TropT <0.01, ABG pH 7.55, pCO2 44, pO2 89, sat 97%m, HCO 38, BE 14, Lactate 1.1, K 2.9 (replaced)  --> @1400 11.52>9.2/20.4<427, lactate 1.2, Mg 2.7. Phos 3.0, 148/3.2(replaced)/97/37/36/0.9<118, 9/24: @0200 10.78>8.5/27.27<349, Mag 2.8, Phos 3, 147/3(repleted)/102/33/29/0.8<1179/25: 16.4>8.4/27.8<401, 150/3.6/111/31/17/0.7<146, Mag 2.5, Phos 1.6 (replaced)    9/23: BCx final no growth  UCx final: 50,000-99,000 e.coli (pan-sensitive)  10,000-49,000 klebsielle pneumoniae (resistant to amp)    9/26: @1000 15.87>8.8/29.2<457, 146/3.5/110/24/13/0.7<136, Mag 2.2,  phos 2  9/27: @0613 16.46>8.3/27.2<442, 141/3.8/106/25/11/0.7<136, Mg 1.9, Ph 2.6  9/28: 18.90>8.4/27.3<466, 138/4.2/105/25/10/0.6<107m Mg 1.7(replaced), Phos 3.2  9/29: 20.14>8.0/25.9<468, 137/4.3/102/25/8/0.6<136, Mg 1.9, Phos 2.8, lactate 2.2  9/30: 34.25>10.6/34.0<783, @1800 coags drawn, quantity insufficient    IMAGING RESULTS:    9/19 CT angio: Consolidations within the right upper, middle, and lower lobes as well as the left lower lobe compatible with pneumonia in the appropriate clinical setting.No evidence of pulmonary embolism.  9/23 Duplex: Venous thrombosis of left peroneal vein branches.No evidence of deep venous thrombosis or superficial thrombophlebitis in right lower extremity.   9/23 CT Abdomen/Pelvis: HEPATOBILIARY: Numerous hepatic hypoattenuating lesions, mostly measuring higher than simple fluid in attenuation, largest measuring 3.3 cm in the left hepatic lobe. PELVIC ORGANS: Crespo catheter in the bladder with associated intraluminal air. Postsurgical changes of hysterectomy and bilateral salpingooophorectomy. PERITONEUM/ MESENTERY/ BOWEL: No findings to suggest bowel obstruction. Oral contrast present in the colon. Small amount of high density material in the pelvis, posterior to the vaginal cuff ikely representing small postoperative hemoperitoneum  9/23 CT Chest: Improved lower lobe predominant bilateral consolidations with new diffuse bilateral groundglass opacities. Findings are nonspecific and may be infectious or inflammatory etiology. New small bilateral pleural effusions. Enlarged mediastinal and bilateral hilar lymph nodes  9/24 CXR: worsening bilateral opacities  9/25-29 CXR: unchanged bilateral opacities   9/26 CT abd/pelvis: 1. Stable post hysterectomy findings pelvis, without any drainable collection. 2. Diffuse bilateral alveolar opacity/consolidation, with interval progression and upper lobe predominance. 3. Small bilateral pleural effusions.   9/29 CT abd/pelvis: Post colectomy/ileorectal anastomosis. Stable mildly distended bowel without bowel obstruction. Stable ascites (since 9/28/2019). Previously reported air containing collection, right paracolic gutter measures approximately 11 cm, compatible with abscess (series 601/24). Additional approximate 6.8 x 2.9 cm air containing collection the region of the vaginal cuff (series 3/702).      ASSESSMENT:  48 yo female w/ PMHx of UC s/p total colectomy, DCIS s/p lumpectomy and radiation, h/o unprovoked PE and prothombin gene mutation (heterozygous) and w/ symptomatic uterine fibroids s/p RATLH BSO cystoscopy, vaginal laceration repair on 9/17, post operative period complicated by N/V, ileus vs SBO, s/p NG tube and fever of unknown origin, s/p failed IR drainage of collection noted on CT, s/pexploratory laparotomy w/ removal of necrotic fibroids x2 (confirmed by frozen, 3 x 3 cm and 5 x 4 cm) and NEMO, POD#0, w/ intraoperative hypotension, fever, tachycardia and oligouria despite pressors and IV fluids, r/o sepsis,     PLAN:     Neurologic:   -Morphine PCA for pain management    Respiratory:     Cardiovascular:   -Hypotensive and tachycardic intraop despite pressors and IV fluids     Gastrointestinal/Nutrition:  -NG tube in place  -NPO  -Protonix daily for GI ppx  -Maintenance fluids LR 125cc/hr,     Renal/Genitourinary:  -Crespo in place, f/u UO  -Baseline Cr 0.6   -Monitor electrolytes and replete if needed  -Small defect in vaginal cuff noted intraop, might have small vaginal discharge     Hematologic:   -SCDs, Lovenox  -Baseline H&H 10.6/34.0, f/u H&H    Infectious Disease:   -On cefepime 2 grams q8hrs, vancomycin 1250 mL q12hrs and flagyl 500 mg q8hrs per ID recommendation  -Latest cultures done on 9/29 midline catheter tip culture prelim neg, blood culture prelim neg, urine culture contaminated, f/u final BCx and catheter culture  -F/u cultures from ex lap on 10/1  -F/u pathology from ex lap on 10/1  -Tmax 103.5 @1200 on 9/19; Last temp: 9/30 100.8 @1228  -Baseline WBC 34.25, f/u WBC    Lines/Tubes:   -EJ peripheral line placed on 9/29   -R radial A line placed on 10/1  -Crespo catheter, f/u UO  -ADONIS drain x2, f/u ouput    Endocrine: NPO, FS q6hrs    Disposition: SICU    Critical Care Diagnoses: Monitor for Sepsis SICU Consultation Note  =====================================================  HPI: 50 yo female w/ PMHx of UC s/p total colectomy, DCIS s/p lumpectomy and radiation, h/o unprovoked PE and prothombin gene mutation (heterozygous) and w/ symptomatic uterine fibroids s/p RATLH BSO cystoscopy, vaginal laceration repair on 9/17, post operative period complicated by N/V, ileus vs SBO, s/p NG tube and fever of unknown origin, s/p failed IR drainage of collection noted on CT, s/p exploratory laparotomy w/ removal of necrotic fibroids x2 (confirmed by frozen, 3 x 3 cm and 5 x 4 cm) and NEMO, POD#0, w/ intraoperative hypotension, fever, tachycardia and oligouria despite pressors and IV fluids, r/o sepsis. Complaining of pain, otherwise doing OK.     Surgery Information  OR time: 1.5 hour    EBL: 50cc    IV Fluids: 3000mL   Blood Products: none   UOP: 50cc (concentrated)        PAST MEDICAL & SURGICAL HISTORY:  OA (osteoarthritis)  Anxiety  Breast cancer: left breast cancer, lumpectomy;  radiation (~2017)  S/P colectomy: reversal with pouch on the left side per pt; hx ulcerative colitis  Liver disease: twisted liver, hemangioma  MVP (mitral valve prolapse)  Bell palsy  Pulmonary embolism: unprovoked, 5 yrs ago, no current meds    HOME MEDS:   None    ALLERGIES:   codeine (Vomiting; Nausea)  latex (Anaphylaxis)  penicillin (Other)    SH:   Advanced Directives: Presumed Full Code     ROS:    A ten-point review of systems was otherwise negative except as noted    CURRENT MEDICATIONS:   --------------------------------------------------------------------------------------  Neurologic Medications:  HYDROmorphone  Injectable 0.5 milliGRAM(s) IV Push every 10 minutes PRN Moderate Pain (4 - 6)  HYDROmorphone  Injectable 1 milliGRAM(s) IV Push every 10 minutes PRN Severe Pain (7 - 10)  ketorolac   Injectable 15 milliGRAM(s) IV Push once PRN Moderate Pain (4 - 6)  ketorolac   Injectable 30 milliGRAM(s) IV Push once PRN Moderate Pain (4 - 6)  meperidine     Injectable 12.5 milliGRAM(s) IV Push every 10 minutes PRN Shivering  metoclopramide Injectable 10 milliGRAM(s) IV Push every 6 hours PRN Nausea and/or Vomiting  ondansetron Injectable 4 milliGRAM(s) IV Push once PRN Postoperative Nausea and/or Vomiting  ondansetron Injectable 4 milliGRAM(s) IV Push once PRN Nausea and/or Vomiting    Gastrointestinal Medications:  lactated ringers. 1000 milliLiter(s) IV Continuous <Continuous>  sodium chloride 0.9%. 1000 milliLiter(s) IV Continuous <Continuous>    Other System Medications:  None  --------------------------------------------------------------------------------------    VITAL SIGNS:  --------------------------------------------------------------------------------------  ICU Vital Signs Last 24 Hrs  T(C): 35.9 (01 Oct 2019 06:40), Max: 37.2 (30 Sep 2019 19:00)  T(F): 96.6 (01 Oct 2019 06:40), Max: 99 (30 Sep 2019 19:00)  HR: 102 (01 Oct 2019 06:40) (99 - 114)  BP: 124/62 (01 Oct 2019 06:40) (117/65 - 124/62)  RR: 18 (01 Oct 2019 06:40) (18 - 19)  SpO2: 98% (01 Oct 2019 06:40) (98% - 98%)    Temps: Tmax 103.5 @1200 on 9/19  Last temp: 9/30 100.8 @1228    EXAM:  General/Neuro: Normal, NAD, alert, oriented x 3.  Respiratory: Lungs clear to auscultation, Normal expansion/effort.    Cardiovascular: Tachycardic (120 bpm manual) with regular rhythm, S1, S2. No peripheral edema.  GI: Abdomen soft, tender around the vertical incision, mildly distended. Dressing dry/intact/clean w/ abdominal binder over it. Right and left sided ADONIS drains. Nasogastric tube in place. Current Diet:  NPO  Tubes/Lines/Drains:  	[x] Peripheral IV: EJ placed on 9/29/19  	[] Central Venous Line     	[] R	[] L	[] IJ	[] Fem	[] SC        Type:	    Date Placed:   	[x] Arterial Line		[x] R	[] L	[] Fem	[x] Rad	[] Ax	Date Placed: 10/1/19  	[] PICC:         	[] Midline		[] Mediport           	[x] Urinary Catheter		Date Placed: 10/1/19  Extremities: Extremities warm, pink, well-perfused.    Derm: Good skin turgor, no skin breakdown.    : Crespo in place. No bleeding. No abnormal vaginal discharge.      LABS:  --------------------------------------------------------------------------------------  Labs:               10.6   34.25 )-----------( 783      ( 30 Sep 2019 11:07 )             34.0    LFTs:     Blood Gas Arterial, Lactate: 2.1 mmoL/L (10-01-19 @ 16:17)    ABG - ( 01 Oct 2019 16:17 )  pH: 7.45  /  pCO2: 33    /  pO2: 91    / HCO3: 23    / Base Excess: -1.0  /  SaO2: 98        Coags:    Culture - Catheter (collected 29 Sep 2019 18:00)  Source: Catheter Midline Tip  Preliminary Report (01 Oct 2019 07:34):    No growth    Culture - Urine (collected 29 Sep 2019 09:29)  Source: .Urine Clean Catch (Midstream)  Final Report (01 Oct 2019 15:43):    >=3 organisms. Probable collection contamination.    Culture - Blood (collected 29 Sep 2019 09:07)  Source: .Blood Blood-Venous  Preliminary Report (30 Sep 2019 17:01):    No growth to date.    --------------------------------------------------------------------------------------    9/23: @0030 8.86>10/31.7<451, 139/3.3/93/30/39/1.1<132, AST/ALT 19/15, Lactate 1.3, Mag 2.4, Phos 3.8, CK 1.7, CKMB 1.3, TropT <0.01, ABG pH 7.55, pCO2 44, pO2 89, sat 97%m, HCO 38, BE 14, Lactate 1.1, K 2.9 (replaced)  --> @1400 11.52>9.2/20.4<427, lactate 1.2, Mg 2.7. Phos 3.0, 148/3.2(replaced)/97/37/36/0.9<118, 9/24: @0200 10.78>8.5/27.27<349, Mag 2.8, Phos 3, 147/3(repleted)/102/33/29/0.8<1179/25: 16.4>8.4/27.8<401, 150/3.6/111/31/17/0.7<146, Mag 2.5, Phos 1.6 (replaced)    9/23: BCx final no growth  UCx final: 50,000-99,000 e.coli (pan-sensitive)  10,000-49,000 klebsielle pneumoniae (resistant to amp)    9/26: @1000 15.87>8.8/29.2<457, 146/3.5/110/24/13/0.7<136, Mg 2.2,  phos 2  9/27: @0613 16.46>8.3/27.2<442, 141/3.8/106/25/11/0.7<136, Mg 1.9, Ph 2.6  9/28: 18.90>8.4/27.3<466, 138/4.2/105/25/10/0.6<107, Mg 1.7(replaced), Phos 3.2  9/29: 20.14>8.0/25.9<468, 137/4.3/102/25/8/0.6<136, Mg 1.9, Phos 2.8, lactate 2.2  9/30: 34.25>10.6/34.0<783, @1800 coags drawn, quantity insufficient    IMAGING RESULTS:    9/19 CT angio: Consolidations within the right upper, middle, and lower lobes as well as the left lower lobe compatible with pneumonia in the appropriate clinical setting.No evidence of pulmonary embolism.  9/23 Duplex: Venous thrombosis of left peroneal vein branches.No evidence of deep venous thrombosis or superficial thrombophlebitis in right lower extremity.   9/23 CT Abdomen/Pelvis: HEPATOBILIARY: Numerous hepatic hypoattenuating lesions, mostly measuring higher than simple fluid in attenuation, largest measuring 3.3 cm in the left hepatic lobe. PELVIC ORGANS: Crespo catheter in the bladder with associated intraluminal air. Postsurgical changes of hysterectomy and bilateral salpingooophorectomy. PERITONEUM/ MESENTERY/ BOWEL: No findings to suggest bowel obstruction. Oral contrast present in the colon. Small amount of high density material in the pelvis, posterior to the vaginal cuff ikely representing small postoperative hemoperitoneum  9/23 CT Chest: Improved lower lobe predominant bilateral consolidations with new diffuse bilateral groundglass opacities. Findings are nonspecific and may be infectious or inflammatory etiology. New small bilateral pleural effusions. Enlarged mediastinal and bilateral hilar lymph nodes  9/24 CXR: worsening bilateral opacities  9/25-29 CXR: unchanged bilateral opacities   9/26 CT abd/pelvis: 1. Stable post hysterectomy findings pelvis, without any drainable collection. 2. Diffuse bilateral alveolar opacity/consolidation, with interval progression and upper lobe predominance. 3. Small bilateral pleural effusions.   9/29 CT abd/pelvis: Post colectomy/ileorectal anastomosis. Stable mildly distended bowel without bowel obstruction. Stable ascites (since 9/28/2019). Previously reported air containing collection, right paracolic gutter measures approximately 11 cm, compatible with abscess (series 601/24). Additional approximate 6.8 x 2.9 cm air containing collection the region of the vaginal cuff (series 3/702).      ASSESSMENT:  50 yo female w/ PMHx of UC s/p total colectomy, DCIS s/p lumpectomy and radiation, h/o unprovoked PE and prothombin gene mutation (heterozygous) and w/ symptomatic uterine fibroids s/p RATLH BSO cystoscopy, vaginal laceration repair on 9/17, post operative period complicated by N/V, ileus vs SBO, s/p NG tube and fever of unknown origin, s/p failed IR drainage of collection noted on CT, s/pexploratory laparotomy w/ removal of necrotic fibroids x2 (confirmed by frozen, 3 x 3 cm and 5 x 4 cm) and NEMO, POD#0, w/ intraoperative hypotension, fever, tachycardia and oligouria despite pressors and IV fluids, r/o sepsis,     PLAN:     Neurologic:   -Morphine PCA for pain management    Respiratory:     Cardiovascular:   -Hypotensive and tachycardic intraop despite pressors and IV fluids   -IV fluids if hypovolemic, if not plan is to place a central line and start pressors     Gastrointestinal/Nutrition:  -NG tube in place  -NPO  -Protonix daily for GI ppx  -Maintenance fluids LR 125cc/hr     Renal/Genitourinary:  -Crespo in place, f/u UO  -Baseline Cr 0.6   -Monitor electrolytes and replete if needed  -Small defect in vaginal cuff noted intraop, might have small vaginal discharge     Hematologic:   -SCDs, Lovenox  -Baseline H&H 10.6/34.0, f/u H&H    Infectious Disease:   -On cefepime 2 grams q8hrs, vancomycin 1250 mL q12hrs and flagyl 500 mg q8hrs per ID recommendation  -Latest cultures done on 9/29 midline catheter tip culture prelim neg, blood culture prelim neg, urine culture contaminated, f/u final BCx and catheter culture  -F/u cultures from ex lap on 10/1  -F/u pathology from ex lap on 10/1  -Tmax 103.5 @1200 on 9/19; Last temp: 9/30 100.8 @1228  -Baseline WBC 34.25, f/u WBC    Lines/Tubes:   -EJ peripheral line placed on 9/29   -R radial A line placed on 10/1  -Crespo catheter, f/u UO  -ADONIS drain x2, f/u ouput    Endocrine:   -NPO, FS q6hrs    Disposition: SICU    Critical Care Diagnoses: Monitor for Sepsis SICU Consultation Note  =====================================================  HPI: 48 yo female w/ PMHx of UC s/p total colectomy, DCIS s/p lumpectomy and radiation, h/o unprovoked PE and prothombin gene mutation (heterozygous) and w/ symptomatic uterine fibroids s/p RATLH BSO cystoscopy, vaginal laceration repair on 9/17, post operative period complicated by N/V, ileus vs SBO, s/p NG tube and fever of unknown origin, s/p failed IR drainage of collection noted on CT, s/p exploratory laparotomy w/ removal of necrotic fibroids x2 (confirmed by frozen, 3 x 3 cm and 5 x 4 cm) and NEMO, POD#0, w/ intraoperative hypotension, fever, tachycardia and oligouria despite pressors and IV fluids, r/o sepsis. Complaining of pain, otherwise doing OK.     Surgery Information  OR time: 1.5 hour    EBL: 50cc    IV Fluids: 3000mL   Blood Products: none   UOP: 50cc (concentrated)        PAST MEDICAL & SURGICAL HISTORY:  OA (osteoarthritis)  Anxiety  Breast cancer: left breast cancer, lumpectomy;  radiation (~2017)  S/P colectomy: reversal with pouch on the left side per pt; hx ulcerative colitis  Liver disease: twisted liver, hemangioma  MVP (mitral valve prolapse)  Bell palsy  Pulmonary embolism: unprovoked, 5 yrs ago, no current meds    HOME MEDS:   None    ALLERGIES:   codeine (Vomiting; Nausea)  latex (Anaphylaxis)  penicillin (Other)    SH:   Advanced Directives: Presumed Full Code     ROS:    A ten-point review of systems was otherwise negative except as noted    CURRENT MEDICATIONS:   --------------------------------------------------------------------------------------  Neurologic Medications:  HYDROmorphone  Injectable 0.5 milliGRAM(s) IV Push every 10 minutes PRN Moderate Pain (4 - 6)  HYDROmorphone  Injectable 1 milliGRAM(s) IV Push every 10 minutes PRN Severe Pain (7 - 10)  ketorolac   Injectable 15 milliGRAM(s) IV Push once PRN Moderate Pain (4 - 6)  ketorolac   Injectable 30 milliGRAM(s) IV Push once PRN Moderate Pain (4 - 6)  meperidine     Injectable 12.5 milliGRAM(s) IV Push every 10 minutes PRN Shivering  metoclopramide Injectable 10 milliGRAM(s) IV Push every 6 hours PRN Nausea and/or Vomiting  ondansetron Injectable 4 milliGRAM(s) IV Push once PRN Postoperative Nausea and/or Vomiting  ondansetron Injectable 4 milliGRAM(s) IV Push once PRN Nausea and/or Vomiting    Gastrointestinal Medications:  lactated ringers. 1000 milliLiter(s) IV Continuous <Continuous>  sodium chloride 0.9%. 1000 milliLiter(s) IV Continuous <Continuous>    Other System Medications:  None  --------------------------------------------------------------------------------------    VITAL SIGNS:  --------------------------------------------------------------------------------------  ICU Vital Signs Last 24 Hrs  T(C): 35.9 (01 Oct 2019 06:40), Max: 37.2 (30 Sep 2019 19:00)  T(F): 96.6 (01 Oct 2019 06:40), Max: 99 (30 Sep 2019 19:00)  HR: 102 (01 Oct 2019 06:40) (99 - 114)  BP: 124/62 (01 Oct 2019 06:40) (117/65 - 124/62)  RR: 18 (01 Oct 2019 06:40) (18 - 19)  SpO2: 98% (01 Oct 2019 06:40) (98% - 98%)    Temps: Tmax 103.5 @1200 on 9/19  Last temp: 9/30 100.8 @1228    EXAM:  General/Neuro: Normal, NAD, alert, oriented x 3.  Respiratory: Lungs clear to auscultation, Normal expansion/effort.    Cardiovascular: Tachycardic (120 bpm manual) with regular rhythm, S1, S2. No peripheral edema.  GI: Abdomen soft, tender around the vertical incision, mildly distended. Dressing dry/intact/clean w/ abdominal binder over it. Right and left sided ADONIS drains. Nasogastric tube in place. Current Diet:  NPO  Tubes/Lines/Drains:  	[x] Peripheral IV: EJ placed on 9/29/19  	[] Central Venous Line     	[] R	[] L	[] IJ	[] Fem	[] SC        Type:	    Date Placed:   	[x] Arterial Line		[x] R	[] L	[] Fem	[x] Rad	[] Ax	Date Placed: 10/1/19  	[] PICC:         	[] Midline		[] Mediport           	[x] Urinary Catheter		Date Placed: 10/1/19  Extremities: Extremities warm, pink, well-perfused.    Derm: Good skin turgor, no skin breakdown.    : Crespo in place. No bleeding. No abnormal vaginal discharge.      LABS:  --------------------------------------------------------------------------------------  Labs:               10.6   34.25 )-----------( 783      ( 30 Sep 2019 11:07 )             34.0    LFTs:     Blood Gas Arterial, Lactate: 2.1 mmoL/L (10-01-19 @ 16:17)    ABG - ( 01 Oct 2019 16:17 )  pH: 7.45  /  pCO2: 33    /  pO2: 91    / HCO3: 23    / Base Excess: -1.0  /  SaO2: 98        Coags:    Culture - Catheter (collected 29 Sep 2019 18:00)  Source: Catheter Midline Tip  Preliminary Report (01 Oct 2019 07:34):    No growth    Culture - Urine (collected 29 Sep 2019 09:29)  Source: .Urine Clean Catch (Midstream)  Final Report (01 Oct 2019 15:43):    >=3 organisms. Probable collection contamination.    Culture - Blood (collected 29 Sep 2019 09:07)  Source: .Blood Blood-Venous  Preliminary Report (30 Sep 2019 17:01):    No growth to date.    --------------------------------------------------------------------------------------    9/23: @0030 8.86>10/31.7<451, 139/3.3/93/30/39/1.1<132, AST/ALT 19/15, Lactate 1.3, Mag 2.4, Phos 3.8, CK 1.7, CKMB 1.3, TropT <0.01, ABG pH 7.55, pCO2 44, pO2 89, sat 97%m, HCO 38, BE 14, Lactate 1.1, K 2.9 (replaced)  --> @1400 11.52>9.2/20.4<427, lactate 1.2, Mg 2.7. Phos 3.0, 148/3.2(replaced)/97/37/36/0.9<118, 9/24: @0200 10.78>8.5/27.27<349, Mag 2.8, Phos 3, 147/3(repleted)/102/33/29/0.8<1179/25: 16.4>8.4/27.8<401, 150/3.6/111/31/17/0.7<146, Mag 2.5, Phos 1.6 (replaced)    9/23: BCx final no growth  UCx final: 50,000-99,000 e.coli (pan-sensitive)  10,000-49,000 klebsielle pneumoniae (resistant to amp)    9/26: @1000 15.87>8.8/29.2<457, 146/3.5/110/24/13/0.7<136, Mg 2.2,  phos 2  9/27: @0613 16.46>8.3/27.2<442, 141/3.8/106/25/11/0.7<136, Mg 1.9, Ph 2.6  9/28: 18.90>8.4/27.3<466, 138/4.2/105/25/10/0.6<107, Mg 1.7(replaced), Phos 3.2  9/29: 20.14>8.0/25.9<468, 137/4.3/102/25/8/0.6<136, Mg 1.9, Phos 2.8, lactate 2.2  9/30: 34.25>10.6/34.0<783, @1800 coags drawn, quantity insufficient    IMAGING RESULTS:    9/19 CT angio: Consolidations within the right upper, middle, and lower lobes as well as the left lower lobe compatible with pneumonia in the appropriate clinical setting.No evidence of pulmonary embolism.  9/23 Duplex: Venous thrombosis of left peroneal vein branches.No evidence of deep venous thrombosis or superficial thrombophlebitis in right lower extremity.   9/23 CT Abdomen/Pelvis: HEPATOBILIARY: Numerous hepatic hypoattenuating lesions, mostly measuring higher than simple fluid in attenuation, largest measuring 3.3 cm in the left hepatic lobe. PELVIC ORGANS: Crespo catheter in the bladder with associated intraluminal air. Postsurgical changes of hysterectomy and bilateral salpingooophorectomy. PERITONEUM/ MESENTERY/ BOWEL: No findings to suggest bowel obstruction. Oral contrast present in the colon. Small amount of high density material in the pelvis, posterior to the vaginal cuff ikely representing small postoperative hemoperitoneum  9/23 CT Chest: Improved lower lobe predominant bilateral consolidations with new diffuse bilateral groundglass opacities. Findings are nonspecific and may be infectious or inflammatory etiology. New small bilateral pleural effusions. Enlarged mediastinal and bilateral hilar lymph nodes  9/24 CXR: worsening bilateral opacities  9/25-29 CXR: unchanged bilateral opacities   9/26 CT abd/pelvis: 1. Stable post hysterectomy findings pelvis, without any drainable collection. 2. Diffuse bilateral alveolar opacity/consolidation, with interval progression and upper lobe predominance. 3. Small bilateral pleural effusions.   9/29 CT abd/pelvis: Post colectomy/ileorectal anastomosis. Stable mildly distended bowel without bowel obstruction. Stable ascites (since 9/28/2019). Previously reported air containing collection, right paracolic gutter measures approximately 11 cm, compatible with abscess (series 601/24). Additional approximate 6.8 x 2.9 cm air containing collection the region of the vaginal cuff (series 3/702).      ASSESSMENT:  48 yo female w/ PMHx of UC s/p total colectomy, DCIS s/p lumpectomy and radiation, h/o unprovoked PE and prothombin gene mutation (heterozygous) and w/ symptomatic uterine fibroids s/p RATLH BSO cystoscopy, vaginal laceration repair on 9/17, post operative period complicated by N/V, ileus vs SBO, s/p NG tube and fever of unknown origin, s/p failed IR drainage of collection noted on CT, s/pexploratory laparotomy w/ removal of necrotic fibroids x2 (confirmed by frozen, 3 x 3 cm and 5 x 4 cm) and NEMO, POD#0, w/ intraoperative hypotension, fever, tachycardia and oligouria despite pressors and IV fluids, r/o sepsis,     PLAN:     Neurologic:   -Morphine PCA for pain management    Respiratory:     Cardiovascular:   -Hypotensive and tachycardic intraop despite pressors and IV fluids   -IV fluids if hypovolemic, if not plan is to place a central line and start pressors     Gastrointestinal/Nutrition:  -NG tube in place  -NPO  -Protonix daily for GI ppx  -Maintenance fluids LR 125cc/hr     Renal/Genitourinary:  -Crespo in place, f/u UO  -Baseline Cr 0.6   -Monitor electrolytes and replete if needed  -Small defect in vaginal cuff noted intraop, might have small vaginal discharge     Hematologic:   -SCDs, Lovenox  -Baseline H&H 10.6/34.0, f/u H&H    Infectious Disease:   -On cefepime 2 grams q8hrs, vancomycin 1250 mL q12hrs and flagyl 500 mg q8hrs per ID recommendation  -Latest cultures done on 9/29 midline catheter tip culture prelim neg, blood culture prelim neg, urine culture contaminated, f/u final BCx and catheter culture  -F/u cultures from ex lap on 10/1  -F/u pathology from ex lap on 10/1  -Tmax 103.5 @1200 on 9/19; Last temp: 9/30 100.8 @1228  -Baseline WBC 34.25, f/u WBC    Lines/Tubes:   -EJ peripheral line placed on 9/29   -R radial A line placed on 10/1  -Crespo catheter, f/u UO  -ADONIS drain x2, f/u ouput    Endocrine:   -NPO, FS q6hrs    Disposition: SICU    Critical Care Diagnoses: Monitor for Sepsis     ADDENDUM:    IJ scanned at bedside in PACU, noncompressible, may need pressors in the future. MAP wnl for now. SICU Consultation Note  =====================================================  HPI: 48 yo female w/ PMHx of UC s/p total colectomy, DCIS s/p lumpectomy and radiation, h/o unprovoked PE and prothombin gene mutation (heterozygous) and w/ symptomatic uterine fibroids s/p RATLH BSO cystoscopy, vaginal laceration repair on 9/17, post operative period complicated by N/V, ileus vs SBO, s/p NG tube and fever of unknown origin, s/p failed IR drainage of collection noted on CT, s/p exploratory laparotomy w/ removal of necrotic fibroids x2 (confirmed by frozen, 3 x 3 cm and 5 x 4 cm) and NEMO, POD#0, w/ intraoperative hypotension, fever, tachycardia and oligouria despite pressors and IV fluids, r/o sepsis. Complaining of pain, otherwise doing OK.     Surgery Information  OR time: 1.5 hour    EBL: 50cc    IV Fluids: 3000mL   Blood Products: none   UOP: 50cc (concentrated)        PAST MEDICAL & SURGICAL HISTORY:  OA (osteoarthritis)  Anxiety  Breast cancer: left breast cancer, lumpectomy;  radiation (~2017)  S/P colectomy: reversal with pouch on the left side per pt; hx ulcerative colitis  Liver disease: twisted liver, hemangioma  MVP (mitral valve prolapse)  Bell palsy  Pulmonary embolism: unprovoked, 5 yrs ago, no current meds    HOME MEDS:   None    ALLERGIES:   codeine (Vomiting; Nausea)  latex (Anaphylaxis)  penicillin (Other)    SH:   Advanced Directives: Presumed Full Code     ROS:    A ten-point review of systems was otherwise negative except as noted    CURRENT MEDICATIONS:   --------------------------------------------------------------------------------------  Neurologic Medications:  HYDROmorphone  Injectable 0.5 milliGRAM(s) IV Push every 10 minutes PRN Moderate Pain (4 - 6)  HYDROmorphone  Injectable 1 milliGRAM(s) IV Push every 10 minutes PRN Severe Pain (7 - 10)  ketorolac   Injectable 15 milliGRAM(s) IV Push once PRN Moderate Pain (4 - 6)  ketorolac   Injectable 30 milliGRAM(s) IV Push once PRN Moderate Pain (4 - 6)  meperidine     Injectable 12.5 milliGRAM(s) IV Push every 10 minutes PRN Shivering  metoclopramide Injectable 10 milliGRAM(s) IV Push every 6 hours PRN Nausea and/or Vomiting  ondansetron Injectable 4 milliGRAM(s) IV Push once PRN Postoperative Nausea and/or Vomiting  ondansetron Injectable 4 milliGRAM(s) IV Push once PRN Nausea and/or Vomiting    Gastrointestinal Medications:  lactated ringers. 1000 milliLiter(s) IV Continuous <Continuous>  sodium chloride 0.9%. 1000 milliLiter(s) IV Continuous <Continuous>    Other System Medications:  None  --------------------------------------------------------------------------------------    VITAL SIGNS:  --------------------------------------------------------------------------------------  ICU Vital Signs Last 24 Hrs  T(C): 35.9 (01 Oct 2019 06:40), Max: 37.2 (30 Sep 2019 19:00)  T(F): 96.6 (01 Oct 2019 06:40), Max: 99 (30 Sep 2019 19:00)  HR: 102 (01 Oct 2019 06:40) (99 - 114)  BP: 124/62 (01 Oct 2019 06:40) (117/65 - 124/62)  RR: 18 (01 Oct 2019 06:40) (18 - 19)  SpO2: 98% (01 Oct 2019 06:40) (98% - 98%)    Temps: Tmax 103.5 @1200 on 9/19  Last temp: 9/30 100.8 @1228    EXAM:  General/Neuro: Normal, NAD, alert, oriented x 3.  Respiratory: Lungs clear to auscultation, Normal expansion/effort.    Cardiovascular: Tachycardic (120 bpm manual) with regular rhythm, S1, S2. No peripheral edema.  GI: Abdomen soft, tender around the vertical incision, mildly distended. Dressing dry/intact/clean w/ abdominal binder over it. Right and left sided ADONIS drains. Nasogastric tube in place. Current Diet:  NPO  Tubes/Lines/Drains:  	[x] Peripheral IV: EJ placed on 9/29/19  	[] Central Venous Line     	[] R	[] L	[] IJ	[] Fem	[] SC        Type:	    Date Placed:   	[x] Arterial Line		[x] R	[] L	[] Fem	[x] Rad	[] Ax	Date Placed: 10/1/19  	[] PICC:         	[] Midline		[] Mediport           	[x] Urinary Catheter		Date Placed: 10/1/19  Extremities: Extremities warm, pink, well-perfused.    Derm: Good skin turgor, no skin breakdown.    : Crespo in place. No bleeding. No abnormal vaginal discharge.      LABS:  --------------------------------------------------------------------------------------  Labs:               10.6   34.25 )-----------( 783      ( 30 Sep 2019 11:07 )             34.0    LFTs:     Blood Gas Arterial, Lactate: 2.1 mmoL/L (10-01-19 @ 16:17)    ABG - ( 01 Oct 2019 16:17 )  pH: 7.45  /  pCO2: 33    /  pO2: 91    / HCO3: 23    / Base Excess: -1.0  /  SaO2: 98        Coags:    Culture - Catheter (collected 29 Sep 2019 18:00)  Source: Catheter Midline Tip  Preliminary Report (01 Oct 2019 07:34):    No growth    Culture - Urine (collected 29 Sep 2019 09:29)  Source: .Urine Clean Catch (Midstream)  Final Report (01 Oct 2019 15:43):    >=3 organisms. Probable collection contamination.    Culture - Blood (collected 29 Sep 2019 09:07)  Source: .Blood Blood-Venous  Preliminary Report (30 Sep 2019 17:01):    No growth to date.    --------------------------------------------------------------------------------------    9/23: @0030 8.86>10/31.7<451, 139/3.3/93/30/39/1.1<132, AST/ALT 19/15, Lactate 1.3, Mag 2.4, Phos 3.8, CK 1.7, CKMB 1.3, TropT <0.01, ABG pH 7.55, pCO2 44, pO2 89, sat 97%m, HCO 38, BE 14, Lactate 1.1, K 2.9 (replaced)  --> @1400 11.52>9.2/20.4<427, lactate 1.2, Mg 2.7. Phos 3.0, 148/3.2(replaced)/97/37/36/0.9<118, 9/24: @0200 10.78>8.5/27.27<349, Mag 2.8, Phos 3, 147/3(repleted)/102/33/29/0.8<1179/25: 16.4>8.4/27.8<401, 150/3.6/111/31/17/0.7<146, Mag 2.5, Phos 1.6 (replaced)    9/23: BCx final no growth  UCx final: 50,000-99,000 e.coli (pan-sensitive)  10,000-49,000 klebsielle pneumoniae (resistant to amp)    9/26: @1000 15.87>8.8/29.2<457, 146/3.5/110/24/13/0.7<136, Mg 2.2,  phos 2  9/27: @0613 16.46>8.3/27.2<442, 141/3.8/106/25/11/0.7<136, Mg 1.9, Ph 2.6  9/28: 18.90>8.4/27.3<466, 138/4.2/105/25/10/0.6<107, Mg 1.7(replaced), Phos 3.2  9/29: 20.14>8.0/25.9<468, 137/4.3/102/25/8/0.6<136, Mg 1.9, Phos 2.8, lactate 2.2  9/30: 34.25>10.6/34.0<783, @1800 coags drawn, quantity insufficient    IMAGING RESULTS:    9/19 CT angio: Consolidations within the right upper, middle, and lower lobes as well as the left lower lobe compatible with pneumonia in the appropriate clinical setting.No evidence of pulmonary embolism.  9/23 Duplex: Venous thrombosis of left peroneal vein branches.No evidence of deep venous thrombosis or superficial thrombophlebitis in right lower extremity.   9/23 CT Abdomen/Pelvis: HEPATOBILIARY: Numerous hepatic hypoattenuating lesions, mostly measuring higher than simple fluid in attenuation, largest measuring 3.3 cm in the left hepatic lobe. PELVIC ORGANS: Crespo catheter in the bladder with associated intraluminal air. Postsurgical changes of hysterectomy and bilateral salpingooophorectomy. PERITONEUM/ MESENTERY/ BOWEL: No findings to suggest bowel obstruction. Oral contrast present in the colon. Small amount of high density material in the pelvis, posterior to the vaginal cuff ikely representing small postoperative hemoperitoneum  9/23 CT Chest: Improved lower lobe predominant bilateral consolidations with new diffuse bilateral groundglass opacities. Findings are nonspecific and may be infectious or inflammatory etiology. New small bilateral pleural effusions. Enlarged mediastinal and bilateral hilar lymph nodes  9/24 CXR: worsening bilateral opacities  9/25-29 CXR: unchanged bilateral opacities   9/26 CT abd/pelvis: 1. Stable post hysterectomy findings pelvis, without any drainable collection. 2. Diffuse bilateral alveolar opacity/consolidation, with interval progression and upper lobe predominance. 3. Small bilateral pleural effusions.   9/29 CT abd/pelvis: Post colectomy/ileorectal anastomosis. Stable mildly distended bowel without bowel obstruction. Stable ascites (since 9/28/2019). Previously reported air containing collection, right paracolic gutter measures approximately 11 cm, compatible with abscess (series 601/24). Additional approximate 6.8 x 2.9 cm air containing collection the region of the vaginal cuff (series 3/702).      ASSESSMENT:  48 yo female w/ PMHx of UC s/p total colectomy, DCIS s/p lumpectomy and radiation, h/o unprovoked PE and prothombin gene mutation (heterozygous) and w/ symptomatic uterine fibroids s/p RATLH BSO cystoscopy, vaginal laceration repair on 9/17, post operative period complicated by N/V, ileus vs SBO, s/p NG tube and fever of unknown origin, s/p failed IR drainage of collection noted on CT, s/pexploratory laparotomy w/ removal of necrotic fibroids x2 (confirmed by frozen, 3 x 3 cm and 5 x 4 cm) and NEMO, POD#0, w/ intraoperative hypotension, fever, tachycardia and oligouria despite pressors and IV fluids, r/o sepsis,     PLAN:     Neurologic:   -Morphine PCA for pain management    Respiratory:     Cardiovascular:   -Hypotensive and tachycardic intraop despite pressors and IV fluids   -IV fluids if hypovolemic, if not plan is to place a central line and start pressors     Gastrointestinal/Nutrition:  -NG tube in place  -NPO  -Protonix daily for GI ppx  -Maintenance fluids LR 125cc/hr     Renal/Genitourinary:  -Crespo in place, f/u UO  -Baseline Cr 0.6   -Monitor electrolytes and replete if needed  -Small defect in vaginal cuff noted intraop, might have small vaginal discharge     Hematologic:   -SCDs, Lovenox  -Baseline H&H 10.6/34.0, f/u H&H    Infectious Disease:   -On cefepime 2 grams q8hrs, vancomycin 1250 mL q12hrs and flagyl 500 mg q8hrs per ID recommendation  -Latest cultures done on 9/29 midline catheter tip culture prelim neg, blood culture prelim neg, urine culture contaminated, f/u final BCx and catheter culture  -F/u cultures from ex lap on 10/1  -F/u pathology from ex lap on 10/1  -Tmax 103.5 @1200 on 9/19; Last temp: 9/30 100.8 @1228  -Baseline WBC 34.25, f/u WBC    Lines/Tubes:   -EJ peripheral line placed on 9/29   -R radial A line placed on 10/1  -Crespo catheter, f/u UO  -ADONIS drain x2 (R right paracolic gutter, L pelvis), f/u ouput    Endocrine:   -NPO, FS q6hrs    Disposition: SICU    Critical Care Diagnoses: Monitor for Sepsis     ADDENDUM:    IJ scanned at bedside in PACU, noncompressible, may need pressors in the future. MAP wnl for now.

## 2019-10-01 NOTE — PROGRESS NOTE ADULT - SUBJECTIVE AND OBJECTIVE BOX
INTERVENTIONAL RADIOLOGY BRIEF-OPERATIVE NOTE    Procedure:    Attempted drainage of rlq collection with ct guidance    Pre-Op Diagnosis: Right lower quadrant collection    Post-Op Diagnosis: same     Attending: Jolie  Resident: None    Anesthesia (type):  [ ] General Anesthesia  [x ] Sedation  [ ] Spinal Anesthesia  [ x] Local/Regional    Contrast: None    Estimated Blood Loss: Minimal, < 5 cc    Condition:   [ ] Critical  [ ] Serious  [ x] Fair   [ ] Good    Findings/Follow up Plan of Care:    Yeuh catheter advanced into RLQ collection- no fluid aspirated  Wire kinked in collection  Collection was firm - may represent evolving hematoma   Procedure aborted  Recommend surgicale valuation  GYN and Dr. Wayne notified at time of exam     Specimens Removed: None    Implants: None    Complications: None    Disposition: Recovery      Please call Interventional Radiology [phone] with any questions, concerns, or issues.

## 2019-10-01 NOTE — BRIEF OPERATIVE NOTE - OPERATION/FINDINGS
small bowel adhesions with purulent infected material, right paracolic necrotic mass x2, approximately 3x3cm and 5x4cm, adhesions of small bowel to vaginal cuff, area of cuff denuded and not able to be exposed for exploration secondary to small bowel adhesions exam under anesthesia: approximately 1.5cm defect in vaginal cuff, allowed drainage of small amount of pink cloudy material, nonfoul smelling. exlap: moderate amount of clear yellow fluid upon entry to abdomen. Loops of small bowel adhered to each other with extensive fibrinous material; right paracolic necrotic mass x2: approximately 3x3cm and 6x4cm; adhesions of small bowel to vaginal cuff area, area of cuff denuded and not able to be exposed for exploration secondary to small bowel adhesions. No areas of necrotic bowel visualized. Culture obtained from right paracolic area. General surgery present for the entire procedure

## 2019-10-01 NOTE — PROGRESS NOTE ADULT - SUBJECTIVE AND OBJECTIVE BOX
Phone conversation with radiology attending: unsuccessful IR drainage procedure. Mass appeared to be hard and not able to drain fluid. Given this, and elevated wbc, temps and pain, decision was made to take the patient to the OR. Antibiotics were switched to cefepime, vanco and flagyl per ID recommendations, 1st dose at 1700 yesterday, since then, 100.6F x1. Unable to draw labs from patient to monitor wbc. Spoke with patient and with her mother about all the above, the need to intervene at this point, and the best way to be an exploratory laparotomy given her surgical history, recent surgery and current picture. General surgery on board. Patient consented for procedure. All questions answered. All risks, benefits discussed as mentioned in prior note  This conversation took place at around 1230 today

## 2019-10-01 NOTE — PROVIDER CONTACT NOTE (OTHER) - REASON
Elevated Temp of 100.2
Emesis
Emesis
Refusing Cat scan
Swelling
Tachy and febrile
Vitals
blood orders
high T
Tachycardia

## 2019-10-01 NOTE — PROGRESS NOTE ADULT - SUBJECTIVE AND OBJECTIVE BOX
GENERAL SURGERY PROGRESS NOTE     MARCELLUS WILSON  49y  Female  Hospital day :14d  POD:  Procedure: Lysis, adhesions, pelvis, laparoscopic  Cystoscopy, diagnostic  Laparoscopic total hysterectomy with salpingo-oophorectomy for uterus greater than 250 grams    OVERNIGHT EVENTS: RUQ and RLQ abdominal pain. Passing gas and having BMs in past day. Patient febrile to max of 103. WBC trending up to 34. IR consulted yesterday. Abscess is drainable      T(F): 96.6 (10-01-19 @ 04:27), Max: 101 (09-30-19 @ 05:30)  HR: 102 (10-01-19 @ 04:27) (99 - 119)  BP: 124/62 (10-01-19 @ 04:27) (111/68 - 132/58)  ABP: --  ABP(mean): --  RR: 19 (10-01-19 @ 04:27) (18 - 19)  SpO2: 98% (09-30-19 @ 15:19) (98% - 98%)    DIET/FLUIDS: dextrose 5% + sodium chloride 0.9% with potassium chloride 20 mEq/L 1000 milliLiter(s) IV Continuous <Continuous>     GI proph:  pantoprazole  Injectable 40 milliGRAM(s) IV Push daily    AC/ proph:   ABx: cefepime   IVPB      cefepime   IVPB 2000 milliGRAM(s) IV Intermittent every 8 hours  metroNIDAZOLE  IVPB 500 milliGRAM(s) IV Intermittent every 8 hours  metroNIDAZOLE  IVPB      vancomycin  IVPB      vancomycin  IVPB 1250 milliGRAM(s) IV Intermittent every 12 hours      PHYSICAL EXAM:  GENERAL: NAD, well-appearing  CHEST/LUNG: Clear to auscultation bilaterally  HEART: Regular rate and rhythm  ABDOMEN: Soft, RUQ and RLQ tender, distended;   EXTREMITIES:  No clubbing, cyanosis, or edema      LABS  Labs:  CAPILLARY BLOOD GLUCOSE                              10.6   34.25 )-----------( 783      ( 30 Sep 2019 11:07 )             34.0       Auto Neutrophil %: 83.7 % (09-30-19 @ 11:07)  Band Neutrophils %: 6.8 % (09-30-19 @ 11:07)    09-29    137  |  102  |  8<L>  ----------------------------<  136<H>  4.3   |  25  |  0.6<L>          LFTs:             4.8  | 0.3  | 31       ------------------[71      ( 29 Sep 2019 09:07 )  1.9  | x    | 21          Lipase:x      Amylase:x          Culture - Blood (collected 29 Sep 2019 09:07)  Source: .Blood Blood-Venous  Preliminary Report (30 Sep 2019 17:01):    No growth to date.      RADIOLOGY & ADDITIONAL TESTS:    < from: CT Abdomen and Pelvis w/ Oral Cont and w/ IV Cont (09.29.19 @ 21:08) >  IMPRESSION:      Post colectomy/ileorectal anastomosis.     Stable mildly distended bowel without bowel obstruction. Stable ascites   (since 9/28/2019      Previously reported air containing collection, right paracolic gutter   measures approximately 11 cm, compatible with abscess (series 601/24).    Additional approximate 6.8 x 2.9 cm air containing collection the region   of the vaginal cuff (series 3/702).    < end of copied text >

## 2019-10-01 NOTE — PROGRESS NOTE ADULT - SUBJECTIVE AND OBJECTIVE BOX
PGY 4 note    Patient seen in PACU resting comfortably.  Upgraded to SICU   Abd pain well controlled.  Denies fever/chills, nausea/vomiting, diarrhea.  NG and graves in.  ADONIS x 2 draining serosanguinous fluid.  Dilauded PCA not started yet due to hypotension.    VS  ICU Vital Signs Last 24 Hrs  T(C): 37 (01 Oct 2019 23:25), Max: 37.3 (01 Oct 2019 16:46)  T(F): 98.6 (01 Oct 2019 23:25), Max: 99.2 (01 Oct 2019 16:46)  HR: 89 (01 Oct 2019 23:40) (89 - 116)  BP: 102/54 (01 Oct 2019 23:40) (87/51 - 124/62)  BP(mean): --  ABP: 111/58 (01 Oct 2019 23:40) (90/56 - 115/65)  ABP(mean): 71 (01 Oct 2019 21:49) (70 - 78)  RR: 13 (01 Oct 2019 23:40) (13 - 29)  SpO2: 97% (01 Oct 2019 23:40) (95% - 98%)    GEN: NAD, AAO x3  Heart: RRR  Lungs: CTAB  Abd: soft, nontender, nondistended, no r/g/r, incision dressing c/d/i  SVE: Deferred, no bleeding on pad  Ext: no calf tenderness or edema      10-01-19 @ 07:01  -  10-01-19 @ 23:58  --------------------------------------------------------  IN: 1197 mL / OUT: 410 mL / NET: 787 mL    NG outpt: 4156-2740 100cc;   R ADONIS: (7139-9112) 75cc serosanguinous  L  ADONIS: 1800- 2300) 60 cc serosanguinous  UO 7745-6496: 95cc        LABS:    lactate 1.7                        9.0    40.91 )-----------( 759      ( 01 Oct 2019 17:30 )             28.3                         10.6   34.25 )-----------( 783      ( 30 Sep 2019 11:07 )             34.0     Magnesium, Serum: 1.4 mg/dL (10-01 @ 17:30)  Antibody Screen: NEG (10-01 @ 16:15)    10-01-19 @ 17:30      133<L>  |  103  |  16  ----------------------------<  114<H>  5.1<H>   |  21  |  0.6<L>    09-29-19 @ 09:07      137  |  102  |  8<L>  ----------------------------<  136<H>  4.3   |  25  |  0.6<L>        Ca    7.2<L>      01 Oct 2019 17:30  Ca    7.5<L>      29 Sep 2019 09:07  Phos  3.7     10-01  Phos  2.8     09-29  Mg     1.4<L>     10-01  Mg     1.9     09-29    TPro  4.8<L>  /  Alb  1.9<L>  /  TBili  0.3  /  DBili  x   /  AST  31  /  ALT  21  /  AlkPhos  71  09-29-19 @ 09:07    Culture - Catheter (collected 09-29-19 @ 18:00)  Source: .Catheter PICC Tip  Preliminary Report (10-01-19 @ 07:34):    No growth    Culture - Urine (collected 09-29-19 @ 09:29)  Source: .Urine Clean Catch (Midstream)  Final Report (10-01-19 @ 15:43):    >=3 organisms. Probable collection contamination.    Culture - Blood (collected 09-29-19 @ 09:07)  Source: .Blood Blood-Venous  Preliminary Report (09-30-19 @ 17:01):    No growth to date.              medications  MEDICATIONS  (STANDING):  cefepime   IVPB 2000 milliGRAM(s) IV Intermittent every 8 hours  HYDROmorphone PCA (1 mG/mL) 30 milliLiter(s) PCA Continuous PCA Continuous  lactated ringers. 1000 milliLiter(s) (150 mL/Hr) IV Continuous <Continuous>  metroNIDAZOLE  IVPB 500 milliGRAM(s) IV Intermittent every 8 hours  sodium chloride 0.9%. 1000 milliLiter(s) (125 mL/Hr) IV Continuous <Continuous>  vancomycin  IVPB        MEDICATIONS  (PRN):  HYDROmorphone  Injectable 0.5 milliGRAM(s) IV Push every 10 minutes PRN Moderate Pain (4 - 6)  HYDROmorphone  Injectable 1 milliGRAM(s) IV Push every 10 minutes PRN Severe Pain (7 - 10)  meperidine     Injectable 12.5 milliGRAM(s) IV Push every 10 minutes PRN Shivering  metoclopramide Injectable 10 milliGRAM(s) IV Push every 6 hours PRN Nausea and/or Vomiting  naloxone Injectable 0.1 milliGRAM(s) IV Push every 3 minutes PRN For ANY of the following changes in patient status:  A. RR LESS THAN 10 breaths per minute, B. Oxygen saturation LESS THAN 90%, C. Sedation score of 6  ondansetron Injectable 4 milliGRAM(s) IV Push every 6 hours PRN Nausea  ondansetron Injectable 4 milliGRAM(s) IV Push once PRN Postoperative Nausea and/or Vomiting  ondansetron Injectable 4 milliGRAM(s) IV Push once PRN Nausea and/or Vomiting      Medications given  acetaminophen  IVPB ..   400 mL/Hr IV Intermittent (10-01-19 @ 18:25)    cefepime   IVPB   100 mL/Hr IV Intermittent (10-01-19 @ 23:07)    cefepime   IVPB   100 mL/Hr IV Intermittent (10-01-19 @ 05:03)   100 mL/Hr IV Intermittent (10-01-19 @ 14:46)    HYDROmorphone  Injectable   0.5 milliGRAM(s) IV Push (10-01-19 @ 22:56)    ketorolac   Injectable   30 milliGRAM(s) IV Push (10-01-19 @ 20:18)    magnesium sulfate  IVPB   50 mL/Hr IV Intermittent (10-01-19 @ 20:38)   50 mL/Hr IV Intermittent (10-01-19 @ 21:36)    metroNIDAZOLE  IVPB   100 mL/Hr IV Intermittent (10-01-19 @ 22:10)    metroNIDAZOLE  IVPB   100 mL/Hr IV Intermittent (10-01-19 @ 05:02)   100 mL/Hr IV Intermittent (10-01-19 @ 14:46)    sodium chloride 0.9% Bolus   1000 mL/Hr IV Bolus (10-01-19 @ 22:39)    sodium chloride 0.9%.   125 mL/Hr IV Continuous (10-01-19 @ 17:14)    vancomycin  IVPB   166.67 mL/Hr IV Intermittent (10-01-19 @ 05:02)    vancomycin  IVPB   166.67 mL/Hr IV Intermittent (10-01-19 @ 18:49)

## 2019-10-02 LAB
ANION GAP SERPL CALC-SCNC: 7 MMOL/L — SIGNIFICANT CHANGE UP (ref 7–14)
ANION GAP SERPL CALC-SCNC: 8 MMOL/L — SIGNIFICANT CHANGE UP (ref 7–14)
BUN SERPL-MCNC: 17 MG/DL — SIGNIFICANT CHANGE UP (ref 10–20)
BUN SERPL-MCNC: 17 MG/DL — SIGNIFICANT CHANGE UP (ref 10–20)
CALCIUM SERPL-MCNC: 7.3 MG/DL — LOW (ref 8.5–10.1)
CALCIUM SERPL-MCNC: 7.5 MG/DL — LOW (ref 8.5–10.1)
CHLORIDE SERPL-SCNC: 104 MMOL/L — SIGNIFICANT CHANGE UP (ref 98–110)
CHLORIDE SERPL-SCNC: 105 MMOL/L — SIGNIFICANT CHANGE UP (ref 98–110)
CO2 SERPL-SCNC: 21 MMOL/L — SIGNIFICANT CHANGE UP (ref 17–32)
CO2 SERPL-SCNC: 22 MMOL/L — SIGNIFICANT CHANGE UP (ref 17–32)
CREAT SERPL-MCNC: 0.6 MG/DL — LOW (ref 0.7–1.5)
CREAT SERPL-MCNC: <0.5 MG/DL — LOW (ref 0.7–1.5)
CULTURE RESULTS: SIGNIFICANT CHANGE UP
GLUCOSE SERPL-MCNC: 108 MG/DL — HIGH (ref 70–99)
GLUCOSE SERPL-MCNC: 119 MG/DL — HIGH (ref 70–99)
GRAM STN FLD: SIGNIFICANT CHANGE UP
HCT VFR BLD CALC: 24.1 % — LOW (ref 37–47)
HCT VFR BLD CALC: 24.6 % — LOW (ref 37–47)
HGB BLD-MCNC: 7.6 G/DL — LOW (ref 12–16)
HGB BLD-MCNC: 7.7 G/DL — LOW (ref 12–16)
MAGNESIUM SERPL-MCNC: 2.2 MG/DL — SIGNIFICANT CHANGE UP (ref 1.8–2.4)
MAGNESIUM SERPL-MCNC: 2.4 MG/DL — SIGNIFICANT CHANGE UP (ref 1.8–2.4)
MCHC RBC-ENTMCNC: 25.1 PG — LOW (ref 27–31)
MCHC RBC-ENTMCNC: 25.4 PG — LOW (ref 27–31)
MCHC RBC-ENTMCNC: 31.3 G/DL — LOW (ref 32–37)
MCHC RBC-ENTMCNC: 31.5 G/DL — LOW (ref 32–37)
MCV RBC AUTO: 80.1 FL — LOW (ref 81–99)
MCV RBC AUTO: 80.6 FL — LOW (ref 81–99)
NRBC # BLD: 0 /100 WBCS — SIGNIFICANT CHANGE UP (ref 0–0)
NRBC # BLD: 0 /100 WBCS — SIGNIFICANT CHANGE UP (ref 0–0)
PHOSPHATE SERPL-MCNC: 3.1 MG/DL — SIGNIFICANT CHANGE UP (ref 2.1–4.9)
PHOSPHATE SERPL-MCNC: 4.4 MG/DL — SIGNIFICANT CHANGE UP (ref 2.1–4.9)
PLATELET # BLD AUTO: 706 K/UL — HIGH (ref 130–400)
PLATELET # BLD AUTO: 764 K/UL — HIGH (ref 130–400)
POTASSIUM SERPL-MCNC: 5 MMOL/L — SIGNIFICANT CHANGE UP (ref 3.5–5)
POTASSIUM SERPL-MCNC: 5.9 MMOL/L — HIGH (ref 3.5–5)
POTASSIUM SERPL-SCNC: 5 MMOL/L — SIGNIFICANT CHANGE UP (ref 3.5–5)
POTASSIUM SERPL-SCNC: 5.9 MMOL/L — HIGH (ref 3.5–5)
RBC # BLD: 2.99 M/UL — LOW (ref 4.2–5.4)
RBC # BLD: 3.07 M/UL — LOW (ref 4.2–5.4)
RBC # FLD: 19 % — HIGH (ref 11.5–14.5)
RBC # FLD: 19.1 % — HIGH (ref 11.5–14.5)
SODIUM SERPL-SCNC: 133 MMOL/L — LOW (ref 135–146)
SODIUM SERPL-SCNC: 134 MMOL/L — LOW (ref 135–146)
SPECIMEN SOURCE: SIGNIFICANT CHANGE UP
SPECIMEN SOURCE: SIGNIFICANT CHANGE UP
WBC # BLD: 30.94 K/UL — HIGH (ref 4.8–10.8)
WBC # BLD: 34.56 K/UL — HIGH (ref 4.8–10.8)
WBC # FLD AUTO: 30.94 K/UL — HIGH (ref 4.8–10.8)
WBC # FLD AUTO: 34.56 K/UL — HIGH (ref 4.8–10.8)

## 2019-10-02 PROCEDURE — 99233 SBSQ HOSP IP/OBS HIGH 50: CPT | Mod: 24,25

## 2019-10-02 PROCEDURE — 99024 POSTOP FOLLOW-UP VISIT: CPT

## 2019-10-02 PROCEDURE — 36556 INSERT NON-TUNNEL CV CATH: CPT

## 2019-10-02 PROCEDURE — 71045 X-RAY EXAM CHEST 1 VIEW: CPT | Mod: 26

## 2019-10-02 PROCEDURE — 93010 ELECTROCARDIOGRAM REPORT: CPT

## 2019-10-02 RX ORDER — INSULIN HUMAN 100 [IU]/ML
10 INJECTION, SOLUTION SUBCUTANEOUS ONCE
Refills: 0 | Status: DISCONTINUED | OUTPATIENT
Start: 2019-10-02 | End: 2019-10-02

## 2019-10-02 RX ORDER — DEXTROSE 10 % IN WATER 10 %
1000 INTRAVENOUS SOLUTION INTRAVENOUS
Refills: 0 | Status: DISCONTINUED | OUTPATIENT
Start: 2019-10-02 | End: 2019-10-05

## 2019-10-02 RX ORDER — HYDROMORPHONE HYDROCHLORIDE 2 MG/ML
0.5 INJECTION INTRAMUSCULAR; INTRAVENOUS; SUBCUTANEOUS EVERY 4 HOURS
Refills: 0 | Status: DISCONTINUED | OUTPATIENT
Start: 2019-10-02 | End: 2019-10-06

## 2019-10-02 RX ORDER — INSULIN HUMAN 100 [IU]/ML
10 INJECTION, SOLUTION SUBCUTANEOUS ONCE
Refills: 0 | Status: COMPLETED | OUTPATIENT
Start: 2019-10-02 | End: 2019-10-02

## 2019-10-02 RX ORDER — SODIUM CHLORIDE 9 MG/ML
500 INJECTION INTRAMUSCULAR; INTRAVENOUS; SUBCUTANEOUS ONCE
Refills: 0 | Status: COMPLETED | OUTPATIENT
Start: 2019-10-02 | End: 2019-10-02

## 2019-10-02 RX ORDER — CALCIUM GLUCONATE 100 MG/ML
1 VIAL (ML) INTRAVENOUS ONCE
Refills: 0 | Status: DISCONTINUED | OUTPATIENT
Start: 2019-10-02 | End: 2019-10-02

## 2019-10-02 RX ORDER — SODIUM CHLORIDE 9 MG/ML
250 INJECTION, SOLUTION INTRAVENOUS ONCE
Refills: 0 | Status: COMPLETED | OUTPATIENT
Start: 2019-10-02 | End: 2019-10-02

## 2019-10-02 RX ORDER — HYDROMORPHONE HYDROCHLORIDE 2 MG/ML
1 INJECTION INTRAMUSCULAR; INTRAVENOUS; SUBCUTANEOUS EVERY 4 HOURS
Refills: 0 | Status: DISCONTINUED | OUTPATIENT
Start: 2019-10-02 | End: 2019-10-06

## 2019-10-02 RX ORDER — BENZOCAINE 10 %
1 GEL (GRAM) MUCOUS MEMBRANE ONCE
Refills: 0 | Status: COMPLETED | OUTPATIENT
Start: 2019-10-02 | End: 2019-10-02

## 2019-10-02 RX ADMIN — Medication 100 MILLIGRAM(S): at 21:49

## 2019-10-02 RX ADMIN — Medication 100 MILLIGRAM(S): at 05:24

## 2019-10-02 RX ADMIN — PANTOPRAZOLE SODIUM 40 MILLIGRAM(S): 20 TABLET, DELAYED RELEASE ORAL at 12:00

## 2019-10-02 RX ADMIN — Medication 100 MILLIGRAM(S): at 13:12

## 2019-10-02 RX ADMIN — Medication 166.67 MILLIGRAM(S): at 17:52

## 2019-10-02 RX ADMIN — SODIUM CHLORIDE 125 MILLILITER(S): 9 INJECTION INTRAMUSCULAR; INTRAVENOUS; SUBCUTANEOUS at 05:31

## 2019-10-02 RX ADMIN — Medication 1 SPRAY(S): at 11:44

## 2019-10-02 RX ADMIN — Medication 250 MILLILITER(S): at 08:24

## 2019-10-02 RX ADMIN — ENOXAPARIN SODIUM 40 MILLIGRAM(S): 100 INJECTION SUBCUTANEOUS at 12:00

## 2019-10-02 RX ADMIN — CEFEPIME 100 MILLIGRAM(S): 1 INJECTION, POWDER, FOR SOLUTION INTRAMUSCULAR; INTRAVENOUS at 21:50

## 2019-10-02 RX ADMIN — CEFEPIME 100 MILLIGRAM(S): 1 INJECTION, POWDER, FOR SOLUTION INTRAMUSCULAR; INTRAVENOUS at 15:11

## 2019-10-02 RX ADMIN — SODIUM CHLORIDE 500 MILLILITER(S): 9 INJECTION INTRAMUSCULAR; INTRAVENOUS; SUBCUTANEOUS at 22:20

## 2019-10-02 RX ADMIN — SODIUM CHLORIDE 1000 MILLILITER(S): 9 INJECTION, SOLUTION INTRAVENOUS at 09:45

## 2019-10-02 RX ADMIN — SODIUM CHLORIDE 125 MILLILITER(S): 9 INJECTION INTRAMUSCULAR; INTRAVENOUS; SUBCUTANEOUS at 22:24

## 2019-10-02 RX ADMIN — HYDROMORPHONE HYDROCHLORIDE 1 MILLIGRAM(S): 2 INJECTION INTRAMUSCULAR; INTRAVENOUS; SUBCUTANEOUS at 22:39

## 2019-10-02 RX ADMIN — Medication 166.67 MILLIGRAM(S): at 06:30

## 2019-10-02 RX ADMIN — INSULIN HUMAN 10 UNIT(S): 100 INJECTION, SOLUTION SUBCUTANEOUS at 08:30

## 2019-10-02 RX ADMIN — CEFEPIME 100 MILLIGRAM(S): 1 INJECTION, POWDER, FOR SOLUTION INTRAMUSCULAR; INTRAVENOUS at 05:10

## 2019-10-02 RX ADMIN — HYDROMORPHONE HYDROCHLORIDE 1 MILLIGRAM(S): 2 INJECTION INTRAMUSCULAR; INTRAVENOUS; SUBCUTANEOUS at 22:24

## 2019-10-02 NOTE — PROGRESS NOTE ADULT - ASSESSMENT
Patient is a 49y old Female s/p exploratory laparotomy with abdominal washout, lysis of adhesions, removal of retained fibroid x2    Plan:   - Care per ICU   - Pain management   - monitor ADONIS drain output   - graves

## 2019-10-02 NOTE — PROGRESS NOTE ADULT - ATTENDING COMMENTS
s/p RATLH, BSO, NEMO for fibroid uterus and pelvic pain on 9/17. 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   saw patient at bedside at around 11am. feels well, pain well controlled with PCA pump. afebrile postop. labs reviewed, wbc from 40 to 34, h/h 9.28.3 to 7.7/24.6, K elevated, received insulin regular 10U, no ekg changes. lovenox for dvt prophylaxis  continues on cefepime, flagyl and vanco.  NGT in place, from 1294-6019 300cc  UO around 27cc/hr from 2270-7008  ADONIS right [id number] from 7571-9644, serosanguinous  ADONIS left pelvis 90cc 3762-8802, serosanguinous  BP range 1606-0411 /50-65; from a line /45-65, HR rage , tmax 98.7  on dilaudid PCA for pain control    admitted to SICU.   trend labs, vs, in/out.  keep hemoglobin >7  recommend nutrition consult: pt has been npo/clear liquid diet most of her hospital stay and is now expected to be NPO for some more days.   continue antibiotics, vanco trough per ID   scd and lovenox for dvt prophylaxis- high risk for vte  f/u surgical cultures

## 2019-10-02 NOTE — CHART NOTE - NSCHARTNOTEFT_GEN_A_CORE
Patient is 48yo female s/p exlap, washout of intraabdominal collection ([facility] BSO 9/17).  Provider was notified at 6:45pm by the PACU nurse that the EJ IV line appeared swollen, possibly infiltrated. Patient was assessed at bedside and found to A&Ox3 in NAD, but right supraclavicular region markedly more edematous than the left.  No blood return could be obtained from the EJ line although fluid could be pushed through the line.  Plan to d/c EJ IV line and apply warm compress. Patient planned for TLC.

## 2019-10-02 NOTE — PROGRESS NOTE ADULT - ASSESSMENT
48 y/o F, w/ PMHx of UC s/p total colectomy, DCIS s/p lumpectomy and radiation, h/o unprovoked PE and prothombin gene mutation (heterozygous) and w/ symptomatic uterine fibroids s/p RATLH BSO cystoscopy, vaginal laceration repair on 9/17, post operative period complicated by N/V, ileus vs SBO, s/p NG tube and fever of unknown origin, s/p failed IR drainage of collection noted on CT, s/pexploratory laparotomy w/ removal of necrotic fibroids x2 (confirmed by frozen, 3 x 3 cm and 5 x 4 cm) and NEMO, POD#0, w/ intraoperative hypotension, fever, tachycardia and oligouria despite pressors and IV fluids, r/o sepsis,     PLAN:     Neurologic:   -A&O x3  -Pain control w/ Morphine PCA - pt not currently endorsing need for PCA pump, will readjust pain meds  -Hx of anxiety -   -Hx of Bell's Palsy    Respiratory:   -On NC @ 2L, satting well - wean to RA  -Encourage IS  -Hx of PE -   AM CXR    Cardiovascular:   -Hypotensive and tachycardic intraop despite pressors and IV fluids - continue with IVF, if unresponsive to fluids, will start pressors  -Hx of MVP    Gastrointestinal/Nutrition:  -NG tube in place - monitor output  -NPO, LR @ 125  -GI ppx: ptx  -hx of UC, s/p colectomy & reversal  -hx of ? liver disease, hemangioma     Renal/Genitourinary:  -Crespo in place, monitor UO - strict I&Os  -Monitor electrolytes and replete if needed    GYN:  -Small defect in vaginal cuff noted intraop, may have small vaginal discharge   -hx of Breast CA, s/p L lumpectomy w/ radiation (2017)    Hematologic:  -Hx of PE   -DVT ppx: SCDs, Lovenox  -Baseline H&H 10.6/34.0, f/u H&H    Infectious Disease:   -Reactive leukocytosis and sepsis: WBC 40 < 34 < 20  -Afebrile (last temp: 9/30: 100.8 // tmax: 9/19: 103.5)  -On cefepime 2 grams q8hrs, vancomycin 1250 mL q12hrs and flagyl 500 mg q8hrs per ID recommendation  -Latest cultures done on 9/29 midline catheter tip culture prelim neg, blood culture prelim neg, urine culture contaminated, f/u final BCx and catheter culture  -F/u cultures from ex lap on 10/1  -F/u pathology from ex lap on 10/1    Endocrine:   -FS q6hrs while NPO  -no chronic diagnosis    Lines/Tubes:   -EJ peripheral line placed on 9/29   -R radial A line placed on 10/1  -Crespo catheter  -ADONIS drain x2 (R right paracolic gutter, L pelvis)      Disposition: SICU

## 2019-10-02 NOTE — PROGRESS NOTE ADULT - SUBJECTIVE AND OBJECTIVE BOX
MARCELLUS WILSON  [id number]  49y Female    Indication for ICU admission: hemodynamic monitoring s/p exlap, washout, removal of intraabdominal or retroperitoneal mass    Admit Date:09-17-19  ICU Date: 10/1/19  OR Date: 10/1/19    codeine (Vomiting; Nausea)  latex (Anaphylaxis)  penicillin (Other)    PAST MEDICAL & SURGICAL HISTORY:  OA (osteoarthritis)  Anxiety  Breast cancer: left breast cancer,, lumpectomy&amp;  radiation (~2017)  S/P colectomy: hx ulcerative colitis  Liver disease: &quot;twisted liver&quot;, hemangioma  MVP (mitral valve prolapse)  Bell palsy  Pulmonary embolism: &gt; 5 yrs ago, no current meds, pt is unsure of cause  Breast cancer: left side, lumpectomy  H/O colectomy: colostom&amp; reversal pt sttaes pouch is on the left side  S/P lumpectomy, left breast    Home Medications:        24HRS EVENT:  Overnight: MAP dropped to 60, BP remained soft, bolused 500 NS with improvement.      Neurologic:  A & O x3  -Morphine PCA for pain management  -Hx of anxiety  -Hx of Bell's palsy    Respiratory:   -On NC @ 2L, satting well  -Hx of PE    Cardiovascular:   -Hypotensive and tachycardic intraop despite pressors and IV fluids - IV fluids if hypovolemic, if not plan is to place a central line and start pressors   -Hx of MVP  -EKG: NSR  -Stress test 3/2019: NSR w/ incomplete RBBB    Gastrointestinal/Nutrition:  -NG tube in place  -NPO, LR @ 125  -GI ppx: ptx  -hx of UC, s/p colectomy and reversal  -hx of ? liver disease, hemangioma     Renal/Genitourinary:  -Crespo in place  -Cr 0.6, stable @ baseline  -Monitor electrolytes and replete if needed    GYN:  -Small defect in vaginal cuff noted intraop, might have small vaginal discharge   -hx of Breast CA, s/p L lumpectomy w/ radiation (2017)    Hematologic:   -Hgb 10 > 4.8 > repeat 9  -DVT ppx: SCDs, Lovenox    Infectious Disease:   -WBC 40 < 34 < 20  -Afebrile (Tmax 103.5 @1200 on 9/19; Last temp: 9/30 100.8 @1228)  -On cefepime 2 grams q8hrs, vancomycin 1250 mL q12hrs and flagyl 500 mg q8hrs per ID recommendation  -Latest cultures done on 9/29 midline catheter tip culture prelim neg, blood culture prelim neg, urine culture contaminated, f/u final BCx and catheter culture  -F/u cultures from ex lap on 10/1  -F/u pathology from ex lap on 10/1    Endocrine:   -NPO, FS q6hrs    Lines/Tubes:   -EJ peripheral line placed on 9/29   -R radial A line placed on 10/1  -Crespo catheter  -ADONIS drain x2 (R right paracolic gutter, L pelvis)      Disposition: SICU      DVT PTX: SCDs, LVX    GI PTX: ptx    ***Tubes/Lines/Drains  ***  Peripheral IV - R EJ 22g           Urinary Catheter		Indication: Strict I&O    Date Placed:       REVIEW OF SYSTEMS    [ x] A ten-point review of systems was otherwise negative except as noted.  [ ] Due to altered mental status/intubation, subjective information were not able to be obtained from the patient. History was obtained, to the extent possible, from review of the chart and collateral sources of information. MARCELLUS WILSON  [id number]  49y Female    Indication for ICU admission: hemodynamic monitoring s/p exlap, washout, removal of intraabdominal or retroperitoneal mass    Admit Date:09-17-19  ICU Date: 10/1/19  OR Date: 10/1/19    codeine (Vomiting; Nausea)  latex (Anaphylaxis)  penicillin (Other)    PAST MEDICAL & SURGICAL HISTORY:  OA (osteoarthritis)  Anxiety  Breast cancer: left breast cancer,, lumpectomy&amp;  radiation (~2017)  S/P colectomy: hx ulcerative colitis  Liver disease: &quot;twisted liver&quot;, hemangioma  MVP (mitral valve prolapse)  Bell palsy  Pulmonary embolism: &gt; 5 yrs ago, no current meds, pt is unsure of cause  Breast cancer: left side, lumpectomy  H/O colectomy: colostom&amp; reversal pt sttaes pouch is on the left side  S/P lumpectomy, left breast    Home Medications:        24HRS EVENT:  Overnight: MAP dropped to 60, BP remained soft, bolused 500 NS with improvement.      Neurologic:  A & O x3  -Morphine PCA for pain management  -Hx of anxiety  -Hx of Bell's palsy    Respiratory:   -On NC @ 2L, satting well  -Hx of PE    Cardiovascular:   -Hypotensive and tachycardic intraop despite pressors and IV fluids - IV fluids if hypovolemic, if not plan is to place a central line and start pressors   -Hx of MVP  -EKG: NSR  -Stress test 3/2019: NSR w/ incomplete RBBB    Gastrointestinal/Nutrition:  -NG tube in place  -NPO, LR @ 125  -GI ppx: ptx  -hx of UC, s/p colectomy and reversal  -hx of ? liver disease, hemangioma     Renal/Genitourinary:  -Crespo in place  -Cr 0.6, stable @ baseline  -Monitor electrolytes and replete if needed    GYN:  -Small defect in vaginal cuff noted intraop, might have small vaginal discharge   -hx of Breast CA, s/p L lumpectomy w/ radiation (2017)    Hematologic:   -Hgb 10 > 4.8 > repeat 9  -DVT ppx: SCDs, Lovenox    Infectious Disease:   -WBC 40 < 34 < 20  -Afebrile (Tmax 103.5 @1200 on 9/19; Last temp: 9/30 100.8 @1228)  -On cefepime 2 grams q8hrs, vancomycin 1250 mL q12hrs and flagyl 500 mg q8hrs per ID recommendation  -Latest cultures done on 9/29 midline catheter tip culture prelim neg, blood culture prelim neg, urine culture contaminated, f/u final BCx and catheter culture  -F/u cultures from ex lap on 10/1  -F/u pathology from ex lap on 10/1    Endocrine:   -NPO, FS q6hrs    Lines/Tubes:   -EJ peripheral line placed on 9/29   -R radial A line placed on 10/1  -Crespo catheter  -ADONIS drain x2 (R right paracolic gutter, L pelvis)      Disposition: SICU      DVT PTX: SCDs, LVX    GI PTX: ptx    ***Tubes/Lines/Drains  ***  Peripheral IV - R EJ 22g           Urinary Catheter		Indication: Strict I&O    Date Placed:       REVIEW OF SYSTEMS    [ x] A ten-point review of systems was otherwise negative except as noted.  [ ] Due to altered mental status/intubation, subjective information were not able to be obtained from the patient. History was obtained, to the extent possible, from review of the chart and collateral sources of information.      Daily Height in cm: 165.1 (01 Oct 2019 13:48)    Daily     Diet, NPO (10-01-19 @ 17:16)      CURRENT MEDS:  Neurologic Medications  HYDROmorphone PCA (1 mG/mL) 30 milliLiter(s) PCA Continuous PCA Continuous  metoclopramide Injectable 10 milliGRAM(s) IV Push every 6 hours PRN Nausea and/or Vomiting  ondansetron Injectable 4 milliGRAM(s) IV Push every 6 hours PRN Nausea    Respiratory Medications    Cardiovascular Medications    Gastrointestinal Medications  calcium gluconate IVPB 1 Gram(s) IV Intermittent once  dextrose 10%. 1000 milliLiter(s) IV Continuous <Continuous>  pantoprazole  Injectable 40 milliGRAM(s) IV Push daily  sodium chloride 0.9%. 1000 milliLiter(s) IV Continuous <Continuous>    Genitourinary Medications    Hematologic/Oncologic Medications  enoxaparin Injectable 40 milliGRAM(s) SubCutaneous daily    Antimicrobial/Immunologic Medications  cefepime   IVPB 2000 milliGRAM(s) IV Intermittent every 8 hours  metroNIDAZOLE  IVPB 500 milliGRAM(s) IV Intermittent every 8 hours  vancomycin  IVPB 1250 milliGRAM(s) IV Intermittent every 12 hours  vancomycin  IVPB        Endocrine/Metabolic Medications  insulin regular  human recombinant. 10 Unit(s) SubCutaneous once    Topical/Other Medications  naloxone Injectable 0.1 milliGRAM(s) IV Push every 3 minutes PRN For ANY of the following changes in patient status:  A. RR LESS THAN 10 breaths per minute, B. Oxygen saturation LESS THAN 90%, C. Sedation score of 6      ICU Vital Signs Last 24 Hrs  T(C): 36.6 (02 Oct 2019 04:25), Max: 37.3 (01 Oct 2019 16:46)  T(F): 97.8 (02 Oct 2019 04:25), Max: 99.2 (01 Oct 2019 16:46)  HR: 80 (02 Oct 2019 06:26) (80 - 116)  BP: 116/57 (02 Oct 2019 06:26) (87/51 - 116/57)  BP(mean): --  ABP: 115/50 (02 Oct 2019 06:26) (90/56 - 118/63)  ABP(mean): 71 (01 Oct 2019 21:49) (70 - 78)  RR: 12 (02 Oct 2019 06:26) (12 - 29)  SpO2: 98% (02 Oct 2019 06:26) (95% - 98%)      Adult Advanced Hemodynamics Last 24 Hrs  CVP(mm Hg): --  CVP(cm H2O): --  CO: --  CI: --  PA: --  PA(mean): --  PCWP: --  SVR: --  SVRI: --  PVR: --  PVRI: --      ABG - ( 01 Oct 2019 16:17 )  pH, Arterial: 7.45  pH, Blood: x     /  pCO2: 33    /  pO2: 91    / HCO3: 23    / Base Excess: -1.0  /  SaO2: 98                  I&O's Summary    01 Oct 2019 07:01  -  02 Oct 2019 07:00  --------------------------------------------------------  IN: 3097 mL / OUT: 900 mL / NET: 2197 mL      I&O's Detail    01 Oct 2019 07:01  -  02 Oct 2019 07:00  --------------------------------------------------------  IN:    IV PiggyBack: 803 mL    lactated ringers.: 75 mL    Sodium Chloride 0.9% IV Bolus: 500 mL    sodium chloride 0.9%.: 1719 mL  Total IN: 3097 mL    OUT:    Bulb: 105 mL    Bulb: 90 mL    Indwelling Catheter - Urethral: 405 mL    Nasoenteral Tube: 300 mL  Total OUT: 900 mL    Total NET: 2197 mL          PHYSICAL EXAM:    General/Neuro  alert & oriented x 3, no focal deficits  Pupils: PERRLA, EOMI    Lungs:      clear to auscultation, Normal expansion/effort.     Cardiovascular : S1, S2.  Regular rate and rhythm.    Cardiac Rhythm: Normal Sinus Rhythm    GI: Abdomen soft, Non-tender, Non-distended.    Wound: c/d/i    Extremities: Extremities warm, pink, well-perfused. Pulses: palp b/l    Derm: Good skin turgor, no skin breakdown.      :       Crespo catheter in place.        LABS:  CAPILLARY BLOOD GLUCOSE                              7.7    34.56 )-----------( 706      ( 02 Oct 2019 04:20 )             24.6         10-02    134<L>  |  105  |  17  ----------------------------<  119<H>  5.9<H>   |  22  |  0.6<L>    Ca    7.3<L>      02 Oct 2019 04:20  Phos  4.4     10-02  Mg     2.4     10-02                  Culture - Catheter (collected 29 Sep 2019 18:00)  Source: .Catheter PICC Tip  Preliminary Report (01 Oct 2019 07:34):    No growth    Culture - Urine (collected 29 Sep 2019 09:29)  Source: .Urine Clean Catch (Midstream)  Final Report (01 Oct 2019 15:43):    >=3 organisms. Probable collection contamination.    Culture - Blood (collected 29 Sep 2019 09:07)  Source: .Blood Blood-Venous  Preliminary Report (30 Sep 2019 17:01):    No growth to date.

## 2019-10-02 NOTE — PROGRESS NOTE ADULT - ATTENDING COMMENTS
48yo female with PMHx/PSHx of total colectomy with primary anastomosis for UC s/p robotic total hysterectomy with salphino-oophorectomy, extensive NEMO 9/17 complicated by ileus then with elevated WBC and intermittent fevers s/p laparotomy, washout and excision of abdominal masses POD#1. Doing well. Admitted to ICU. pain controlled, exam benign. wound healing well. Continue antibiotics, monitor ADONIS drain output, monitor NG tube output. Serial abdominal exams. Encourage ambulation/OOB. Primary care as per primary team.

## 2019-10-02 NOTE — PROGRESS NOTE ADULT - ASSESSMENT
48yo P1 h/o UC s/p total colectomy, h/o DCIS s/p lumpectomy and radiation, h/o unprovoked PE and prothombin gene mutation, hx of fibroids s/p RATLH BSO cystoscopy, vaginal laceration repair, EBL 250cc, POD 15, complicated by SBO and pelvic abscess, leukocytosis, recurrent fevers,   s/p failed IR drainage.   10/1 ex lap, removal of pelvic massx2 by gen surg, abdominal washout, EBL 50cc, upgraded to SICU due to hypotension and decreased UO in the OR for hemodynamic monitoring.  Post op recovering well, currently hemodynamically and clinically stable    RECOMMENDATIONS:  F/u OR cultures  Vanco trough please  Vancomycin 1250 mg iv q12h  Cefepime 2 gm iv q8h  Flagyl 500 mg iv q8h

## 2019-10-02 NOTE — PROGRESS NOTE ADULT - SUBJECTIVE AND OBJECTIVE BOX
MARCELLUS WILSON  49y, Female      OVERNIGHT EVENTS:  no fevers, no pressors, has abdominal pain, JPs with sero sanguinous fluid  PROCEDURES: EX LAP 10/1   Abdominal washout  Surgical removal of intra-abdominal or retroperitoneal mass greater than 10 cm in diameter  Lysis, adhesions        VITALS:  T(F): 97.8, Max: 99.2 (10-01-19 @ 16:46)  HR: 80  BP: 116/57  RR: 12Vital Signs Last 24 Hrs  T(C): 36.6 (02 Oct 2019 04:25), Max: 37.3 (01 Oct 2019 16:46)  T(F): 97.8 (02 Oct 2019 04:25), Max: 99.2 (01 Oct 2019 16:46)  HR: 80 (02 Oct 2019 06:26) (80 - 116)  BP: 116/57 (02 Oct 2019 06:26) (87/51 - 116/57)  BP(mean): --  RR: 12 (02 Oct 2019 06:26) (12 - 29)  SpO2: 98% (02 Oct 2019 06:26) (95% - 98%)    TESTS & MEASUREMENTS:                        7.7    34.56 )-----------( 706      ( 02 Oct 2019 04:20 )             24.6     10-02    134<L>  |  105  |  17  ----------------------------<  119<H>  5.9<H>   |  22  |  0.6<L>    Ca    7.3<L>      02 Oct 2019 04:20  Phos  4.4     10-02  Mg     2.4     10-02          Culture - Catheter (collected 09-29-19 @ 18:00)  Source: .Catheter PICC Tip  Preliminary Report (10-01-19 @ 07:34):    No growth    Culture - Urine (collected 09-29-19 @ 09:29)  Source: .Urine Clean Catch (Midstream)  Final Report (10-01-19 @ 15:43):    >=3 organisms. Probable collection contamination.    Culture - Blood (collected 09-29-19 @ 09:07)  Source: .Blood Blood-Venous  Preliminary Report (09-30-19 @ 17:01):    No growth to date.            RADIOLOGY & ADDITIONAL TESTS:    ANTIBIOTICS:  cefepime   IVPB 2000 milliGRAM(s) IV Intermittent every 8 hours  metroNIDAZOLE  IVPB 500 milliGRAM(s) IV Intermittent every 8 hours  vancomycin  IVPB 1250 milliGRAM(s) IV Intermittent every 12 hours  vancomycin  IVPB

## 2019-10-02 NOTE — ANESTHESIA FOLLOW-UP NOTE - NSEVALATIONFT_GEN_ALL_CORE
awake,alert, spont breathing extubated. due to borderline BP, tachycardia, and possible sepsis ICU consult appreciated and report given to LELAND Cuadra
K 5.9 today SICU team aware and a w/u and ordering treatment

## 2019-10-02 NOTE — PROGRESS NOTE ADULT - SUBJECTIVE AND OBJECTIVE BOX
PGY-2 Note:    Patient seen and examined. Pain well controlled at this time. No complaints at this time. Denies fever, chills, nausea, vomiting, chest pain, shortness of breath, severe abdominal pain, heavy vaginal bleeding. Is ambulating, tolerating PO, passing flatus.     Physical Exam:  Vital Signs:  T(C): 37.3 (10-02-19 @ 20:00), Max: 37.3 (10-02-19 @ 20:00)  HR: 105 (10-02-19 @ 22:00) (78 - 106)  BP: 107/51 (10-02-19 @ 22:00) (100/50 - 135/49)  RR: 18 (10-02-19 @ 22:00) (12 - 20)  SpO2: 98% (10-02-19 @ 22:00) (96% - 100%)    10-01-19 @ 07:01  -  10-02-19 @ 07:00  --------------------------------------------------------  IN: 0 mL / OUT: 405 mL / NET: -405 mL    10-02-19 @ 07:01  -  10-02-19 @ 22:27  --------------------------------------------------------  IN: 0 mL / OUT: 510 mL / NET: -510 mL    UO: 8404-9458: 40/40  right ADONIS drain: 30cc @1800  left ADONIS drain: 20cc  @1800    Gen: NAD, A&Ox3  Heart: S1S2,RRR  Lungs: CTAB  Abd: ND, soft, mild tenderness near incision. BS+, dressing c/d/i.   VE: Deferred  Ext: SCDs, no edema or calf tenderness bilaterally    Labs:                        7.6    30.94 )-----------( 764      ( 02 Oct 2019 13:45 )             24.1     10-02    133<L>  |  104  |  17  ----------------------------<  108<H>  5.0   |  21  |  <0.5<L>    Ca    7.5<L>      02 Oct 2019 13:45  Phos  3.1     10-02  Mg     2.2     10-02        Medications:  cefepime   IVPB 2000 milliGRAM(s) IV Intermittent every 8 hours  dextrose 10%. 1000 milliLiter(s) IV Continuous <Continuous>  enoxaparin Injectable 40 milliGRAM(s) SubCutaneous daily  HYDROmorphone  Injectable 0.5 milliGRAM(s) IV Push every 4 hours PRN  HYDROmorphone  Injectable 1 milliGRAM(s) IV Push every 4 hours PRN  metoclopramide Injectable 10 milliGRAM(s) IV Push every 6 hours PRN  metroNIDAZOLE  IVPB 500 milliGRAM(s) IV Intermittent every 8 hours  naloxone Injectable 0.1 milliGRAM(s) IV Push every 3 minutes PRN  ondansetron Injectable 4 milliGRAM(s) IV Push every 6 hours PRN  pantoprazole  Injectable 40 milliGRAM(s) IV Push daily  sodium chloride 0.9%. 1000 milliLiter(s) IV Continuous <Continuous>  vancomycin  IVPB 1250 milliGRAM(s) IV Intermittent every 12 hours  vancomycin  IVPB

## 2019-10-02 NOTE — PROGRESS NOTE ADULT - SUBJECTIVE AND OBJECTIVE BOX
PGY 4 Note    Patient examined at bedside, no overnight events, under ICU observation.  This AM report minimal pain, denies fevers/chills, nausea/vomiting.  NPO. Not Ambulating. Using incentive spirometry.     T(F): 97.8 (10-02-19 @ 04:25), Max: 99.2 (10-01-19 @ 16:46)  HR: 80 (10-02-19 @ 06:26) (80 - 116)  BP: 116/57 (10-02-19 @ 06:26) (87/51 - 116/57)  RR: 12 (10-02-19 @ 06:26) (12 - 29)  SpO2: 98% (10-02-19 @ 06:26) (95% - 98%)    I&O's Summary    01 Oct 2019 07:01  -  02 Oct 2019 07:00  --------------------------------------------------------  IN: 3097 mL / OUT: 900 mL / NET: 2197 mL      Urine:   10-01-19 @ 07:01  -  10-02-19 @ 07:00  --------------------------------------------------------  IN: 0 mL / OUT: 405 mL / NET: -405 mL      NG Tube:   10-01-19 @ 07:01  -  10-02-19 @ 07:00  --------------------------------------------------------  IN: 0 mL / OUT: 300 mL / NET: -300 mL      Drain:   10-01-19 @ 07:01  -  10-02-19 @ 07:00  --------------------------------------------------------  IN: 0 mL / OUT: 195 mL / NET: -195 mL    Physical Exam:  General: AAOx3. NAD  CVS: RRR. Nl S1S2  Lungs: CTAB  Abdomen: soft, non-tender, non-distended, +BSx4  Incision: vertical incision with dressing in place, C/D, no erythema, no draining  VE: deferred, no bleeding on pad/chux  Ext: No edema. No calf tenderness. SCDs in place    Labs:             7.7<L>  34.56<H> )-----------( 706<H>    ( 10-02 @ 04:20 )             24.6<L>               9.0<L>  40.91<HH> )-----------( 759<H>    ( 10-01 @ 17:30 )             28.3<L>               10.6<L>  34.25<H> )-----------( 783<H>    ( 09-30 @ 11:07 )             34.0<L>               8.0<L>  20.14<H> )-----------( 468<H>    ( 09-29 @ 09:07 )             25.9<L>               8.4<L>  18.90<H> )-----------( 466<H>    ( 09-28 @ 06:09 )             27.3<L>     10-02    134<L>  |  105  |  17  ----------------------------<  119<H>  5.9<H>   |  22  |  0.6<L>    Ca    7.3<L>      02 Oct 2019 04:20  Phos  4.4     10-02  Mg     2.4     10-02        Culture - Catheter (collected 29 Sep 2019 18:00)  Source: .Catheter PICC Tip  Preliminary Report (01 Oct 2019 07:34):    No growth    Culture - Urine (collected 29 Sep 2019 09:29)  Source: .Urine Clean Catch (Midstream)  Final Report (01 Oct 2019 15:43):    >=3 organisms. Probable collection contamination.    Culture - Blood (collected 29 Sep 2019 09:07)  Source: .Blood Blood-Venous  Preliminary Report (30 Sep 2019 17:01):    No growth to date.            Trend:             7.7<L>  34.56<H> )-----------( 706<H>    ( 10-02 @ 04:20 )             24.6<L>               9.0<L>  40.91<HH> )-----------( 759<H>    ( 10-01 @ 17:30 )             28.3<L>               10.6<L>  34.25<H> )-----------( 783<H>    ( 09-30 @ 11:07 )             34.0<L>               8.0<L>  20.14<H> )-----------( 468<H>    ( 09-29 @ 09:07 )             25.9<L>               8.4<L>  18.90<H> )-----------( 466<H>    ( 09-28 @ 06:09 )             27.3<L>        Creatinine, Serum: 0.6 (10-02)  Creatinine, Serum: 0.6 (10-01)  Creatinine, Serum: 0.6 (09-29)  Creatinine, Serum: 0.6 (09-28)  Creatinine, Serum: 0.7 (09-27)  Creatinine, Serum: 0.7 (09-26)  Creatinine, Serum: 0.7 (09-25)  Creatinine, Serum: 0.8 (09-24)  Creatinine, Serum: 0.9 (09-23)  Creatinine, Serum: 1.1 (09-23)  Creatinine, Serum: 0.8 (09-21)  Creatinine, Serum: 0.7 (09-20)  Creatinine, Serum: 0.7 (09-19)  Creatinine, Serum: 0.9 (09-17)      Medications:  MEDICATIONS  (STANDING):  cefepime   IVPB 2000 milliGRAM(s) IV Intermittent every 8 hours  enoxaparin Injectable 40 milliGRAM(s) SubCutaneous daily  HYDROmorphone PCA (1 mG/mL) 30 milliLiter(s) PCA Continuous PCA Continuous  metroNIDAZOLE  IVPB 500 milliGRAM(s) IV Intermittent every 8 hours  pantoprazole  Injectable 40 milliGRAM(s) IV Push daily  sodium chloride 0.9%. 1000 milliLiter(s) (125 mL/Hr) IV Continuous <Continuous>  vancomycin  IVPB 1250 milliGRAM(s) IV Intermittent every 12 hours  vancomycin  IVPB        MEDICATIONS  (PRN):  metoclopramide Injectable 10 milliGRAM(s) IV Push every 6 hours PRN Nausea and/or Vomiting  naloxone Injectable 0.1 milliGRAM(s) IV Push every 3 minutes PRN For ANY of the following changes in patient status:  A. RR LESS THAN 10 breaths per minute, B. Oxygen saturation LESS THAN 90%, C. Sedation score of 6  ondansetron Injectable 4 milliGRAM(s) IV Push every 6 hours PRN Nausea

## 2019-10-02 NOTE — PROGRESS NOTE ADULT - SUBJECTIVE AND OBJECTIVE BOX
GENERAL SURGERY PROGRESS NOTE     MARCELLUS WILSON  49y  Female  Hospital day :15d  POD:  Procedure: Surgical removal of intra-abdominal or retroperitoneal mass greater than 10 cm in diameter  Abdominal washout  Surgical removal of intra-abdominal or retroperitoneal mass greater than 10 cm in diameter  Lysis, adhesions, intestine  Exploratory laparotomy  Lysis, adhesions, pelvis, laparoscopic  Cystoscopy, diagnostic  Laparoscopic total hysterectomy with salpingo-oophorectomy for uterus greater than 250 grams    OVERNIGHT EVENTS: s/p exploratory laparotomy with abdominal washout, lysis of adhesions, removal of retained fibroid x2. Patient has severe pain. Recently started on Dilaudid PCA. b/l ADONIS drains, graves, admitted to ICU    T(F): 98.6 (10-01-19 @ 23:25), Max: 99.2 (10-01-19 @ 16:46)  HR: 88 (10-02-19 @ 01:25) (88 - 116)  BP: 106/55 (10-02-19 @ 01:25) (87/51 - 124/62)  ABP: 118/63 (10-02-19 @ 01:25) (90/56 - 118/63)  ABP(mean): 71 (10-01-19 @ 21:49) (70 - 78)  RR: 12 (10-02-19 @ 01:25) (12 - 29)  SpO2: 98% (10-02-19 @ 01:25) (95% - 98%)    DIET/FLUIDS: lactated ringers. 1000 milliLiter(s) IV Continuous <Continuous>  sodium chloride 0.9%. 1000 milliLiter(s) IV Continuous <Continuous>        URINE:    Indwelling Urethral Catheter:     Connect To:  Straight Drainage/[place]    Indication:  Perioperative use for Selected Surgical Procedures (10-01-19 @ 17:16)    GI proph:  pantoprazole  Injectable 40 milliGRAM(s) IV Push daily    AC/ proph:   ABx: cefepime   IVPB 2000 milliGRAM(s) IV Intermittent every 8 hours  metroNIDAZOLE  IVPB 500 milliGRAM(s) IV Intermittent every 8 hours  vancomycin  IVPB 1250 milliGRAM(s) IV Intermittent every 12 hours  vancomycin  IVPB          PHYSICAL EXAM:  GENERAL: NAD, well-appearing  CHEST/LUNG: Clear to auscultation bilaterally  HEART: Regular rate and rhythm  ABDOMEN: dressings dry and intact, Soft, tender abdomen diffusely with midline worst area, Nondistended; JPx2 with SS fluid  EXTREMITIES:  No clubbing, cyanosis, or edema      LABS  Labs:  CAPILLARY BLOOD GLUCOSE                              9.0    40.91 )-----------( 759      ( 01 Oct 2019 17:30 )             28.3         10-01    133<L>  |  103  |  16  ----------------------------<  114<H>  5.1<H>   |  21  |  0.6<L>      Magnesium, Serum: 1.4 mg/dL (10-01-19 @ 17:30)      LFTs:     Lactate, Blood: 1.7 mmol/L (10-01-19 @ 17:30)  Blood Gas Arterial, Lactate: 2.1 mmoL/L (10-01-19 @ 16:17)    ABG - ( 01 Oct 2019 16:17 )  pH: 7.45  /  pCO2: 33    /  pO2: 91    / HCO3: 23    / Base Excess: -1.0  /  SaO2: 98                Culture - Catheter (collected 29 Sep 2019 18:00)  Source: .Catheter PICC Tip  Preliminary Report (01 Oct 2019 07:34):    No growth    Culture - Urine (collected 29 Sep 2019 09:29)  Source: .Urine Clean Catch (Midstream)  Final Report (01 Oct 2019 15:43):    >=3 organisms. Probable collection contamination.    Culture - Blood (collected 29 Sep 2019 09:07)  Source: .Blood Blood-Venous  Preliminary Report (30 Sep 2019 17:01):    No growth to date.          RADIOLOGY & ADDITIONAL TESTS:      A/P

## 2019-10-02 NOTE — PROGRESS NOTE ADULT - ATTENDING COMMENTS
PLAN:     Neurologic:   -A&O x3  -Pain control w/ Morphine PCA - pt not currently endorsing need for PCA pump, will readjust pain meds  -Hx of anxiety -   -Hx of Bell's Palsy    Respiratory:   -On NC @ 2L, satting well - wean to RA  -Encourage IS  -Hx of PE -   AM CXR    Cardiovascular:   -Hypotensive and tachycardic intraop despite pressors and IV fluids - continue with IVF, if unresponsive to fluids, will start pressors  -Hx of MVP    Gastrointestinal/Nutrition:  -NG tube in place - monitor output  -NPO, LR @ 125  -GI ppx: ptx  -hx of UC, s/p colectomy & reversal  -hx of ? liver disease, hemangioma     Renal/Genitourinary:  -Crespo in place, monitor UO - strict I&Os  -Monitor electrolytes and replete if needed    GYN:  -Small defect in vaginal cuff noted intraop, may have small vaginal discharge   -hx of Breast CA, s/p L lumpectomy w/ radiation (2017)    Hematologic:  -Hx of PE   -DVT ppx: SCDs, Lovenox  -Baseline H&H 10.6/34.0, f/u H&H    Infectious Disease:   -Reactive leukocytosis and sepsis: WBC 40 < 34 < 20  -Afebrile (last temp: 9/30: 100.8 // tmax: 9/19: 103.5)  -On cefepime 2 grams q8hrs, vancomycin 1250 mL q12hrs and flagyl 500 mg q8hrs per ID recommendation  -Latest cultures done on 9/29 midline catheter tip culture prelim neg, blood culture prelim neg, urine culture contaminated, f/u final BCx and catheter culture  -F/u cultures from ex lap on 10/1  -F/u pathology from ex lap on 10/1    Endocrine:   -FS q6hrs while NPO  -no chronic diagnosis    Lines/Tubes:   -EJ peripheral line placed on 9/29   -R radial A line placed on 10/1  -Crespo catheter  -ADONIS drain x2 (R right paracolic gutter, L pelvis)      Disposition: SICU

## 2019-10-02 NOTE — PROGRESS NOTE ADULT - ASSESSMENT
A/P:  48yo P1 h/o ulcerative colitis with total colectomy, h/o DCIS s/p lumpectomy with radiation, h/o unprovoked PE found to have prothrombin mutation (heterozygous), with fibroid uterus and pelvic pain s/p RATLH, BSO, cystoscopy, EBL 250cc, complicated by postoperative ileus vs SBO infected pelvic mass s/p reop exlap with removal of abdominal massx2 and abdominal wash out, POD#0  GI: NG, NPO, maintenance IVF  DVT prophylaxis: lovenox, SCDs  Neuro: pain control, dilaudid PCA ordered but held secondary to hypotension  Cardio: hypotensive, monitor, per SICU will consider pressors if not responsive to IVF  Pulm: incentive spirometer  ID: c/w cefepime, vanco, flagyl, monitor temps  Endo: no dx, f/u FS q6h  : strict I/Os, vaginal cuff opening approx 1.5cm, expect vaginal drainage  Heme: f/u ADONIS output, transfuse prn    Management per SICU team.      Will inform Dr Wayne

## 2019-10-02 NOTE — PROGRESS NOTE ADULT - ASSESSMENT
48yo P1, PMH of ulcerative colitis, s/p total colectomy, DCIS s/p lumpectomy with radiation, unprovoked PE found to have prothrombin mutation (heterozygous), with fibroid uterus and pelvic pain s/p RATLH BSO with cystoscopy, POD#16, complicated by postoperative ileus vs SBO infected pelvic mass s/p reoperation- exploratory laparotomy with removal of abdominal massx2 and abdominal wash out, POD#1  GI: NG, NPO, maintenance IVF  DVT ppx: lovenox, SCDs   Neuro: Tylenol for pain control  Cardio: Stable, Central line in place   Pulm: incentive spirometer bedside and encouraged   ID: c/w cefepime, vanco, flagyl, monitor temps, currently afebrile   Endo: no dx, f/u FS q6h  : strict I/Os, vaginal cuff opening approx 1.5cm, expect vaginal drainage  Heme: f/u ADONIS output, transfuse prn    Dr. Jaime bedside   Continue management per SICU

## 2019-10-03 LAB
ANION GAP SERPL CALC-SCNC: 7 MMOL/L — SIGNIFICANT CHANGE UP (ref 7–14)
APTT BLD: 32.1 SEC — SIGNIFICANT CHANGE UP (ref 27–39.2)
BUN SERPL-MCNC: 16 MG/DL — SIGNIFICANT CHANGE UP (ref 10–20)
CALCIUM SERPL-MCNC: 6.9 MG/DL — LOW (ref 8.5–10.1)
CHLORIDE SERPL-SCNC: 104 MMOL/L — SIGNIFICANT CHANGE UP (ref 98–110)
CO2 SERPL-SCNC: 21 MMOL/L — SIGNIFICANT CHANGE UP (ref 17–32)
CREAT SERPL-MCNC: 0.5 MG/DL — LOW (ref 0.7–1.5)
GLUCOSE SERPL-MCNC: 102 MG/DL — HIGH (ref 70–99)
HCT VFR BLD CALC: 19.4 % — LOW (ref 37–47)
HCT VFR BLD CALC: 20.1 % — LOW (ref 37–47)
HCT VFR BLD CALC: 22.3 % — LOW (ref 37–47)
HCT VFR BLD CALC: 24.1 % — LOW (ref 37–47)
HGB BLD-MCNC: 6.2 G/DL — CRITICAL LOW (ref 12–16)
HGB BLD-MCNC: 6.3 G/DL — CRITICAL LOW (ref 12–16)
HGB BLD-MCNC: 7.1 G/DL — LOW (ref 12–16)
HGB BLD-MCNC: 7.8 G/DL — LOW (ref 12–16)
INR BLD: 1.64 RATIO — HIGH (ref 0.65–1.3)
MCHC RBC-ENTMCNC: 25.4 PG — LOW (ref 27–31)
MCHC RBC-ENTMCNC: 25.6 PG — LOW (ref 27–31)
MCHC RBC-ENTMCNC: 25.8 PG — LOW (ref 27–31)
MCHC RBC-ENTMCNC: 26.3 PG — LOW (ref 27–31)
MCHC RBC-ENTMCNC: 31.3 G/DL — LOW (ref 32–37)
MCHC RBC-ENTMCNC: 31.8 G/DL — LOW (ref 32–37)
MCHC RBC-ENTMCNC: 32 G/DL — SIGNIFICANT CHANGE UP (ref 32–37)
MCHC RBC-ENTMCNC: 32.4 G/DL — SIGNIFICANT CHANGE UP (ref 32–37)
MCV RBC AUTO: 80.2 FL — LOW (ref 81–99)
MCV RBC AUTO: 81 FL — SIGNIFICANT CHANGE UP (ref 81–99)
MCV RBC AUTO: 81.1 FL — SIGNIFICANT CHANGE UP (ref 81–99)
MCV RBC AUTO: 81.1 FL — SIGNIFICANT CHANGE UP (ref 81–99)
NRBC # BLD: 0 /100 WBCS — SIGNIFICANT CHANGE UP (ref 0–0)
PLATELET # BLD AUTO: 635 K/UL — HIGH (ref 130–400)
PLATELET # BLD AUTO: 655 K/UL — HIGH (ref 130–400)
PLATELET # BLD AUTO: 671 K/UL — HIGH (ref 130–400)
PLATELET # BLD AUTO: 699 K/UL — HIGH (ref 130–400)
POTASSIUM SERPL-MCNC: 4.9 MMOL/L — SIGNIFICANT CHANGE UP (ref 3.5–5)
POTASSIUM SERPL-SCNC: 4.9 MMOL/L — SIGNIFICANT CHANGE UP (ref 3.5–5)
PROTHROM AB SERPL-ACNC: 18.8 SEC — HIGH (ref 9.95–12.87)
RBC # BLD: 2.42 M/UL — LOW (ref 4.2–5.4)
RBC # BLD: 2.48 M/UL — LOW (ref 4.2–5.4)
RBC # BLD: 2.75 M/UL — LOW (ref 4.2–5.4)
RBC # BLD: 2.97 M/UL — LOW (ref 4.2–5.4)
RBC # FLD: 18.2 % — HIGH (ref 11.5–14.5)
RBC # FLD: 18.6 % — HIGH (ref 11.5–14.5)
RBC # FLD: 19 % — HIGH (ref 11.5–14.5)
RBC # FLD: 19.6 % — HIGH (ref 11.5–14.5)
SODIUM SERPL-SCNC: 132 MMOL/L — LOW (ref 135–146)
SURGICAL PATHOLOGY STUDY: SIGNIFICANT CHANGE UP
VANCOMYCIN TROUGH SERPL-MCNC: 13.1 UG/ML — HIGH (ref 5–10)
WBC # BLD: 15.54 K/UL — HIGH (ref 4.8–10.8)
WBC # BLD: 15.63 K/UL — HIGH (ref 4.8–10.8)
WBC # BLD: 16.95 K/UL — HIGH (ref 4.8–10.8)
WBC # BLD: 20.49 K/UL — HIGH (ref 4.8–10.8)
WBC # FLD AUTO: 15.54 K/UL — HIGH (ref 4.8–10.8)
WBC # FLD AUTO: 15.63 K/UL — HIGH (ref 4.8–10.8)
WBC # FLD AUTO: 16.95 K/UL — HIGH (ref 4.8–10.8)
WBC # FLD AUTO: 20.49 K/UL — HIGH (ref 4.8–10.8)

## 2019-10-03 PROCEDURE — 71045 X-RAY EXAM CHEST 1 VIEW: CPT | Mod: 26

## 2019-10-03 PROCEDURE — 99024 POSTOP FOLLOW-UP VISIT: CPT

## 2019-10-03 RX ADMIN — Medication 166.67 MILLIGRAM(S): at 06:33

## 2019-10-03 RX ADMIN — CEFEPIME 100 MILLIGRAM(S): 1 INJECTION, POWDER, FOR SOLUTION INTRAMUSCULAR; INTRAVENOUS at 05:38

## 2019-10-03 RX ADMIN — CEFEPIME 100 MILLIGRAM(S): 1 INJECTION, POWDER, FOR SOLUTION INTRAMUSCULAR; INTRAVENOUS at 14:36

## 2019-10-03 RX ADMIN — PANTOPRAZOLE SODIUM 40 MILLIGRAM(S): 20 TABLET, DELAYED RELEASE ORAL at 11:05

## 2019-10-03 RX ADMIN — Medication 100 MILLIGRAM(S): at 05:38

## 2019-10-03 RX ADMIN — Medication 166.67 MILLIGRAM(S): at 19:04

## 2019-10-03 RX ADMIN — Medication 100 MILLIGRAM(S): at 21:21

## 2019-10-03 RX ADMIN — ENOXAPARIN SODIUM 40 MILLIGRAM(S): 100 INJECTION SUBCUTANEOUS at 11:05

## 2019-10-03 RX ADMIN — Medication 100 MILLIGRAM(S): at 14:36

## 2019-10-03 RX ADMIN — ONDANSETRON 4 MILLIGRAM(S): 8 TABLET, FILM COATED ORAL at 15:41

## 2019-10-03 RX ADMIN — CEFEPIME 100 MILLIGRAM(S): 1 INJECTION, POWDER, FOR SOLUTION INTRAMUSCULAR; INTRAVENOUS at 21:21

## 2019-10-03 NOTE — PROGRESS NOTE ADULT - SUBJECTIVE AND OBJECTIVE BOX
PGY 3 Note    Patient examined at bedside, pain well controlled on PO/IV medications.  Denies fevers/chills, HA/N/V, CP/SOB/palpitations, abdominal pain, vaginal bleeding, hematuria/dysuria, constipation/diarrhea. Tolerating clear/full/regular diet, passing/no flatus. Not Ambulating. Using incentive spirometry.     T(F): 98.8 (10-03-19 @ 04:00), Max: 99.2 (10-02-19 @ 20:00)  HR: 85 (10-03-19 @ 05:00) (78 - 106)  BP: 111/54 (10-03-19 @ 05:00) (97/50 - 135/49)  RR: 18 (10-03-19 @ 05:00) (12 - 20)  SpO2: 100% (10-03-19 @ 05:00) (96% - 100%)    I&O's Summary    01 Oct 2019 07:01  -  02 Oct 2019 07:00  --------------------------------------------------------  IN: 3097 mL / OUT: 900 mL / NET: 2197 mL    02 Oct 2019 07:01  -  03 Oct 2019 06:30  --------------------------------------------------------  IN: 4388 mL / OUT: 1095 mL / NET: 3293 mL      Urine:   10-01-19 @ 07:01  -  10-02-19 @ 07:00  --------------------------------------------------------  IN: 0 mL / OUT: 405 mL / NET: -405 mL    10-02-19 @ 07:01  -  10-03-19 @ 06:30  --------------------------------------------------------  IN: 0 mL / OUT: 835 mL / NET: -835 mL      NG Tube:   10-01-19 @ 07:01  -  10-02-19 @ 07:00  --------------------------------------------------------  IN: 0 mL / OUT: 300 mL / NET: -300 mL    10-02-19 @ 07:01  -  10-03-19 @ 06:30  --------------------------------------------------------  IN: 0 mL / OUT: 150 mL / NET: -150 mL      Drain:   10-01-19 @ 07:01  -  10-02-19 @ 07:00  --------------------------------------------------------  IN: 0 mL / OUT: 195 mL / NET: -195 mL    10-02-19 @ 07:01  -  10-03-19 @ 06:30  --------------------------------------------------------  IN: 0 mL / OUT: 110 mL / NET: -110 mL      CAPILLARY BLOOD GLUCOSE          Physical Exam:  General: AAOx3. NAD  CVS: RRR. Nl S1S2  Lungs: CTAB  Abdomen: soft, non-tender, non-distended, +BSx4  Incision: C/D/I, no erythema, no draining  VE: deferred, no bleeding on pad/chux  Ext: No edema. SCDs in place    Labs:             6.2<LL>  16.95<H> )-----------( 635<H>    ( 10-03 @ 01:00 )             19.4<L>               6.3<LL>  20.49<H> )-----------( 699<H>    ( 10-03 @ 00:25 )             20.1<L>               7.6<L>  30.94<H> )-----------( 764<H>    ( 10-02 @ 13:45 )             24.1<L>               7.7<L>  34.56<H> )-----------( 706<H>    ( 10-02 @ 04:20 )             24.6<L>               9.0<L>  40.91<HH> )-----------( 759<H>    ( 10-01 @ 17:30 )             28.3<L>     10-03    132<L>  |  104  |  16  ----------------------------<  102<H>  4.9   |  21  |  0.5<L>    Ca    6.9<L>      03 Oct 2019 00:25  Phos  3.1     10-02  Mg     2.2     10-02        Culture - Body Fluid with Gram Stain (collected 01 Oct 2019 15:00)  Source: .Body Fluid None  Gram Stain (02 Oct 2019 09:54):    polymorphonuclear leukocytes seen    No organisms seen by cytocentrifuge            Trend:             6.2<LL>  16.95<H> )-----------( 635<H>    ( 10-03 @ 01:00 )             19.4<L>               6.3<LL>  20.49<H> )-----------( 699<H>    ( 10-03 @ 00:25 )             20.1<L>               7.6<L>  30.94<H> )-----------( 764<H>    ( 10-02 @ 13:45 )             24.1<L>               7.7<L>  34.56<H> )-----------( 706<H>    ( 10-02 @ 04:20 )             24.6<L>               9.0<L>  40.91<HH> )-----------( 759<H>    ( 10-01 @ 17:30 )             28.3<L>        Creatinine, Serum: 0.5 (10-03)  Creatinine, Serum: <0.5 (10-02)  Creatinine, Serum: 0.6 (10-02)  Creatinine, Serum: 0.6 (10-01)  Creatinine, Serum: 0.6 (09-29)  Creatinine, Serum: 0.6 (09-28)  Creatinine, Serum: 0.7 (09-27)  Creatinine, Serum: 0.7 (09-26)  Creatinine, Serum: 0.7 (09-25)  Creatinine, Serum: 0.8 (09-24)  Creatinine, Serum: 0.9 (09-23)  Creatinine, Serum: 1.1 (09-23)  Creatinine, Serum: 0.8 (09-21)  Creatinine, Serum: 0.7 (09-20)  Creatinine, Serum: 0.7 (09-19)  Creatinine, Serum: 0.9 (09-17)      Medications:  MEDICATIONS  (STANDING):  cefepime   IVPB 2000 milliGRAM(s) IV Intermittent every 8 hours  dextrose 10%. 1000 milliLiter(s) (250 mL/Hr) IV Continuous <Continuous>  enoxaparin Injectable 40 milliGRAM(s) SubCutaneous daily  metroNIDAZOLE  IVPB 500 milliGRAM(s) IV Intermittent every 8 hours  pantoprazole  Injectable 40 milliGRAM(s) IV Push daily  sodium chloride 0.9%. 1000 milliLiter(s) (125 mL/Hr) IV Continuous <Continuous>  vancomycin  IVPB 1250 milliGRAM(s) IV Intermittent every 12 hours  vancomycin  IVPB        MEDICATIONS  (PRN):  HYDROmorphone  Injectable 0.5 milliGRAM(s) IV Push every 4 hours PRN Moderate Pain (4 - 6)  HYDROmorphone  Injectable 1 milliGRAM(s) IV Push every 4 hours PRN Severe Pain (7 - 10)  metoclopramide Injectable 10 milliGRAM(s) IV Push every 6 hours PRN Nausea and/or Vomiting  naloxone Injectable 0.1 milliGRAM(s) IV Push every 3 minutes PRN For ANY of the following changes in patient status:  A. RR LESS THAN 10 breaths per minute, B. Oxygen saturation LESS THAN 90%, C. Sedation score of 6  ondansetron Injectable 4 milliGRAM(s) IV Push every 6 hours PRN Nausea PGY 4 Note    Patient examined at bedside, fells weak/tired otherwise no complaints.  EJ infiltrated, central line placed overnight.  Pain well controlled.  Denies fevers/chills, N/V, CP/SOB/palpitations. NPO, + flatus. Not Ambulating. Using incentive spirometry.     T(F): 98.8 (10-03-19 @ 04:00), Max: 99.2 (10-02-19 @ 20:00)  HR: 85 (10-03-19 @ 05:00) (78 - 106)  BP: 111/54 (10-03-19 @ 05:00) (97/50 - 135/49)  RR: 18 (10-03-19 @ 05:00) (12 - 20)  SpO2: 100% (10-03-19 @ 05:00) (96% - 100%)    I&O's Summary    01 Oct 2019 07:01  -  02 Oct 2019 07:00  --------------------------------------------------------  IN: 3097 mL / OUT: 900 mL / NET: 2197 mL    02 Oct 2019 07:01  -  03 Oct 2019 06:30  --------------------------------------------------------  IN: 4388 mL / OUT: 1095 mL / NET: 3293 mL      Urine:   10-01-19 @ 07:01  -  10-02-19 @ 07:00  --------------------------------------------------------  IN: 0 mL / OUT: 405 mL / NET: -405 mL    10-02-19 @ 07:01  -  10-03-19 @ 06:30  --------------------------------------------------------  IN: 0 mL / OUT: 835 mL / NET: -835 mL      NG Tube:   10-01-19 @ 07:01  -  10-02-19 @ 07:00  --------------------------------------------------------  IN: 0 mL / OUT: 300 mL / NET: -300 mL    10-02-19 @ 07:01  -  10-03-19 @ 06:30  --------------------------------------------------------  IN: 0 mL / OUT: 150 mL / NET: -150 mL      Drain:   10-01-19 @ 07:01  -  10-02-19 @ 07:00  --------------------------------------------------------  IN: 0 mL / OUT: 195 mL / NET: -195 mL    10-02-19 @ 07:01  -  10-03-19 @ 06:30  --------------------------------------------------------  IN: 0 mL / OUT: 110 mL / NET: -110 mL      CAPILLARY BLOOD GLUCOSE          Physical Exam:  General: NAD. NG to LCS  CVS: RRR. Nl S1S2  Lungs: CTAB  Abdomen: soft, non-tender, non-distended, BS hypoactive, ADONIS in RUQ and LLQ draining serosanguinous fluid  Incision: dressing in place, C/D, no erythema, no draining  VE: deferred, no bleeding on pad/chux  Ext: No edema or calf tenderness. SCDs in place    Labs:             6.2<LL>  16.95<H> )-----------( 635<H>    ( 10-03 @ 01:00 )             19.4<L>               6.3<LL>  20.49<H> )-----------( 699<H>    ( 10-03 @ 00:25 )             20.1<L>               7.6<L>  30.94<H> )-----------( 764<H>    ( 10-02 @ 13:45 )             24.1<L>               7.7<L>  34.56<H> )-----------( 706<H>    ( 10-02 @ 04:20 )             24.6<L>               9.0<L>  40.91<HH> )-----------( 759<H>    ( 10-01 @ 17:30 )             28.3<L>     10-03    132<L>  |  104  |  16  ----------------------------<  102<H>  4.9   |  21  |  0.5<L>    Ca    6.9<L>      03 Oct 2019 00:25  Phos  3.1     10-02  Mg     2.2     10-02        Culture - Body Fluid with Gram Stain (collected 01 Oct 2019 15:00)  Source: .Body Fluid None  Gram Stain (02 Oct 2019 09:54):    polymorphonuclear leukocytes seen    No organisms seen by cytocentrifuge            Trend:             6.2<LL>  16.95<H> )-----------( 635<H>    ( 10-03 @ 01:00 )             19.4<L>               6.3<LL>  20.49<H> )-----------( 699<H>    ( 10-03 @ 00:25 )             20.1<L>               7.6<L>  30.94<H> )-----------( 764<H>    ( 10-02 @ 13:45 )             24.1<L>               7.7<L>  34.56<H> )-----------( 706<H>    ( 10-02 @ 04:20 )             24.6<L>               9.0<L>  40.91<HH> )-----------( 759<H>    ( 10-01 @ 17:30 )             28.3<L>        Creatinine, Serum: 0.5 (10-03)  Creatinine, Serum: <0.5 (10-02)  Creatinine, Serum: 0.6 (10-02)  Creatinine, Serum: 0.6 (10-01)  Creatinine, Serum: 0.6 (09-29)  Creatinine, Serum: 0.6 (09-28)  Creatinine, Serum: 0.7 (09-27)  Creatinine, Serum: 0.7 (09-26)  Creatinine, Serum: 0.7 (09-25)  Creatinine, Serum: 0.8 (09-24)  Creatinine, Serum: 0.9 (09-23)  Creatinine, Serum: 1.1 (09-23)  Creatinine, Serum: 0.8 (09-21)  Creatinine, Serum: 0.7 (09-20)  Creatinine, Serum: 0.7 (09-19)  Creatinine, Serum: 0.9 (09-17)      Medications:  MEDICATIONS  (STANDING):  cefepime   IVPB 2000 milliGRAM(s) IV Intermittent every 8 hours  dextrose 10%. 1000 milliLiter(s) (250 mL/Hr) IV Continuous <Continuous>  enoxaparin Injectable 40 milliGRAM(s) SubCutaneous daily  metroNIDAZOLE  IVPB 500 milliGRAM(s) IV Intermittent every 8 hours  pantoprazole  Injectable 40 milliGRAM(s) IV Push daily  sodium chloride 0.9%. 1000 milliLiter(s) (125 mL/Hr) IV Continuous <Continuous>  vancomycin  IVPB 1250 milliGRAM(s) IV Intermittent every 12 hours  vancomycin  IVPB        MEDICATIONS  (PRN):  HYDROmorphone  Injectable 0.5 milliGRAM(s) IV Push every 4 hours PRN Moderate Pain (4 - 6)  HYDROmorphone  Injectable 1 milliGRAM(s) IV Push every 4 hours PRN Severe Pain (7 - 10)  metoclopramide Injectable 10 milliGRAM(s) IV Push every 6 hours PRN Nausea and/or Vomiting  naloxone Injectable 0.1 milliGRAM(s) IV Push every 3 minutes PRN For ANY of the following changes in patient status:  A. RR LESS THAN 10 breaths per minute, B. Oxygen saturation LESS THAN 90%, C. Sedation score of 6  ondansetron Injectable 4 milliGRAM(s) IV Push every 6 hours PRN Nausea

## 2019-10-03 NOTE — PROGRESS NOTE ADULT - REASON FOR ADMISSION
scheduled surgery
hemodynamic monitoring s/p exlap, washout, removal of intraabdominal or retroperitoneal mass

## 2019-10-03 NOTE — ANESTHESIA FOLLOW-UP NOTE - NSEVALATION_GEN_ALL_CORE
All questions were answered/No apparent complications or complaints regarding anesthesia care at this time
No apparent complications or complaints regarding anesthesia care at this time
No apparent complications or complaints regarding anesthesia care at this time/All questions were answered
No apparent complications or complaints regarding anesthesia care at this time

## 2019-10-03 NOTE — CHART NOTE - NSCHARTNOTEFT_GEN_A_CORE
HPI: 50 yo female w/ PMHx of UC s/p total colectomy, DCIS s/p lumpectomy and radiation, h/o unprovoked PE and prothrombin gene mutation (heterozygous) and w/ symptomatic uterine fibroids s/p RATLH BSO cystoscopy, vaginal laceration repair on 9/17, post operative period complicated by N/V, ileus vs SBO, s/p NG tube and fever of unknown origin with septic symptoms, s/p failed IR drainage of collection noted on CT, s/p exploratory laparotomy for abdominal washout w/ removal of necrotic fibroids x2 (confirmed by frozen, 3 x 3 cm and 5 x 4 cm) and NEMO, upgraded to SICU for intraoperative hypotension and sepsis monitoring.  In the SICU, patient's blood pressure has been managed with boluses. Urine output has been stable. An episode of hyperkalemia was treated with dextrose and insulin.  Her hemoglobin has been downtrending, but no signs of bleeding has been identified.  She was transfused one unit of PRBCs on 10/3 w/ recovery of hemoglobin from 7.1 to 7.8.  She was on a PCA for pain running through EJ line, but because of infiltration of IV line in the right arm, the PCA was discontinued and used to run medication/fluids. Eventually on 10/2 the EJ line infiltrated as well and a TLC on the left was placed successfully. Primary team requests NGT to stay in place. Crespo and A line have been discontinued. Patient will be downgraded with TLC as well.      PHYSICAL EXAM:    General/Neuro  Alert & oriented x 3, no focal deficits    Lungs: Clear to auscultation, Normal expansion/effort.     Cardiovascular: S1, S2.  Normotensive. Nontachycardic. Regular rate and rhythm.    Cardiac Rhythm: Normal Sinus Rhythm    GI: Abdomen soft, Non-tender, Non-distended.  NGT tube in place.   Wound: Midline as well as left lateral abdominal incisions c/d/i. ADONIS drain in RLQ with SS output.     Extremities: Extremities warm, pink, well-perfused.     Derm: Good skin turgor, no skin breakdown.      : No Crespo.     CXR: Unchanged bilateral opacities/effusions.  Left IJ central venous catheter with tip terminating in the SVC. Stable   enteric tube.    ASSESSMENT:   50 yo female w/ PMHx of UC s/p total colectomy, DCIS s/p lumpectomy and radiation, h/o unprovoked PE and prothombin gene mutation (heterozygous) and w/ symptomatic uterine fibroids s/p RATLH BSO cystoscopy, vaginal laceration repair on 9/17, post operative period complicated by N/V, ileus vs SBO, s/p NG tube and fever of unknown origin, s/p failed IR drainage of collection noted on CT, s/pexploratory laparotomy w/ removal of necrotic fibroids x2 (confirmed by frozen, 3 x 3 cm and 5 x 4 cm) and NEMO, upgraded to SICU for hypotension and sepsis monitoring.    PLAN:   Neurologic:   -A&O x3  -Pain control w/ diluadid PRN  -Nausea: Reglan  -Hx of anxiety and [place] Palsy    Respiratory:   -RA, maintain 02 sat >94%  -Encourage IS/PT  -Hx of unprovoked  PE ( right lower lobe, 1/2012; unprovoked, positive for 1 copy of prothrombin [id number] mutation)  -AM CXR: B/l opacities and effusion, stable     Cardiovascular:   -Hypotensive and tachycardic intraop despite pressors and IV fluids   -Treated with fluid boluses for hypotension. No pressors needed.   -Hx of MVP    Gastrointestinal/Nutrition:  -NG tube in place - monitor output, to stay in as per primary team  -NPO, NS @ 125  -GI ppx: ptx  -hx of UC, s/p colectomy & reversal  -hx of ? liver disease, hemangioma?     Renal/Genitourinary:  -Crespo discontinued. Passed TOV, 100cc and 50 cc  -Lytes-Na 132 // K 4.9 // Phos 3.1 //  Mag 2.2      GYN:  -Small defect in vaginal cuff noted intraop, may have small vaginal discharge   -hx of Breast CA, s/p L lumpectomy w/ radiation (2017)    Hematologic:  -Hx of PE   -DVT ppx: SCDs, Lovenox  -Baseline H&H 10.6/34.0, f/u H&H    Acute blood loss anemia   -Received total of 2 u PRBCs  -Hgb: 6.3 >6.2>7.1>7.8    Infectious Disease:   -Leukocytosis wbc downtrending from 40 to 15  -Afebrile (last temp: 9/30: 100.8 // tmax: 9/19: 103.5)  -On cefepime 2 grams q8hrs, vancomycin 1250 mL q12hrs and flagyl 500 mg q8hrs per ID recommendation  -Latest cultures done on 9/29 midline catheter tip culture prelim neg, blood culture prelim neg, urine culture contaminated, f/u final BCx and catheter culture  -F/u cultures from ex lap on 10/1  -F/u pathology from ex lap on 10/1    Endocrine:   -FS q6hrs while NPO  -no chronic diagnosis    Lines/Tubes:   -TLC 10/2  -R radial A line placed on 10/1---d/c'd  -Crespo catheter---d/c'd  -ADONIS drain x2 (R right paracolic gutter, L pelvis)      Disposition: Downgrade to floor HPI: 50 yo female w/ PMHx of UC s/p total colectomy, DCIS s/p lumpectomy and radiation, h/o unprovoked PE and prothrombin gene mutation (heterozygous) and w/ symptomatic uterine fibroids s/p RATLH BSO cystoscopy, vaginal laceration repair on 9/17, post operative period complicated by N/V, ileus vs SBO, s/p NG tube and fever of unknown origin with septic symptoms, s/p failed IR drainage of collection noted on CT, s/p exploratory laparotomy for abdominal washout w/ removal of necrotic fibroids x2 (confirmed by frozen, 3 x 3 cm and 5 x 4 cm) and NEMO, upgraded to SICU for intraoperative hypotension and sepsis monitoring.  In the SICU, patient's blood pressure has been managed with fluid boluses. Urine output has been stable. An episode of hyperkalemia was treated with dextrose and insulin.  Her hemoglobin has been downtrending, but no signs of bleeding has been identified.  She was transfused total of four units of PRBCs since 10/1 w/ recovery of hemoglobin from 7.1 to 7.8.  She was on a PCA for pain running through EJ line, but because of infiltration of IV line in the right arm, the PCA was discontinued and used to run medication/fluids. Eventually on 10/2 the EJ line infiltrated as well and a TLC on the left was placed successfully. Primary team requests NGT to stay in place. Crespo and A line have been discontinued. Patient will be downgraded with TLC as well.      PHYSICAL EXAM:    General/Neuro  Alert & oriented x 3, no focal deficits    Lungs: Clear to auscultation, Normal expansion/effort.     Cardiovascular: S1, S2.  Normotensive. Nontachycardic. Regular rate and rhythm.    Cardiac Rhythm: Normal Sinus Rhythm    GI: Abdomen soft, Non-tender, Non-distended.  NGT tube in place.   Wound: Midline as well as left lateral abdominal incisions c/d/i. ADONIS drain in RLQ with SS output.     Extremities: Extremities warm, pink, well-perfused.     Derm: Good skin turgor, no skin breakdown.      : No Crespo.     CXR: Unchanged bilateral opacities/effusions.  Left IJ central venous catheter with tip terminating in the SVC. Stable   enteric tube.    ASSESSMENT:   50 yo female w/ PMHx of UC s/p total colectomy, DCIS s/p lumpectomy and radiation, h/o unprovoked PE and prothombin gene mutation (heterozygous) and w/ symptomatic uterine fibroids s/p RATLH BSO cystoscopy, vaginal laceration repair on 9/17, post operative period complicated by N/V, ileus vs SBO, s/p NG tube and fever of unknown origin, s/p failed IR drainage of collection noted on CT, s/pexploratory laparotomy w/ removal of necrotic fibroids x2 (confirmed by frozen, 3 x 3 cm and 5 x 4 cm) and NEMO, upgraded to SICU for hypotension and sepsis monitoring.    PLAN:   Neurologic:   -A&O x3  -Pain control w/ diluadid PRN  -Nausea: Reglan  -Hx of anxiety and [place] Palsy    Respiratory:   -RA, maintain 02 sat >94%  -Encourage IS/PT  -Hx of unprovoked  PE ( right lower lobe, 1/2012; unprovoked, positive for 1 copy of prothrombin [id number] mutation)  -AM CXR: B/l opacities and effusion, stable     Cardiovascular:   -Hypotensive and tachycardic intraop despite pressors and IV fluids   -Treated with fluid boluses for hypotension. No pressors needed.   -Hx of MVP    Gastrointestinal/Nutrition:  -NG tube in place - monitor output, to stay in as per primary team  -NPO, NS @ 125  -GI ppx: ptx  -hx of UC, s/p colectomy & reversal  -hx of ? liver disease, hemangioma?     Renal/Genitourinary:  -Crespo discontinued. Passed TOV, 100cc and 50 cc  -Lytes-Na 132 // K 4.9 // Phos 3.1 //  Mag 2.2    GYN:  -Small defect in vaginal cuff noted intraop, may have small vaginal discharge   -hx of Breast CA, s/p L lumpectomy w/ radiation (2017)    Hematologic:  -Hx of PE   -DVT ppx: SCDs, Lovenox  -Baseline H&H 10.6/34.0, f/u H&H    Acute blood loss anemia   -Received total of 4 u PRBCs since 10/1, last unit 10/3 am  -Hgb: 6.3 >6.2>7.1>7.8    Infectious Disease:   -Leukocytosis wbc downtrending from 40 to 15  -Afebrile (last temp: 9/30: 100.8 // tmax: 9/19: 103.5)  -On cefepime 2 grams q8hrs, vancomycin 1250 mL q12hrs and flagyl 500 mg q8hrs per ID recommendation  -Latest cultures done on 9/29 midline catheter tip culture prelim neg, blood culture prelim neg, urine culture contaminated, f/u final BCx and catheter culture  -F/u cultures from ex lap on 10/1  -F/u pathology from ex lap on 10/1    Endocrine:   -FS q6hrs while NPO  -no chronic diagnosis    Lines/Tubes:   -TLC 10/2  -R radial A line placed on 10/1---d/c'd  -Crespo catheter---d/c'd  -ADONIS drain x2 (R right paracolic gutter, L pelvis)      Disposition: Downgrade to floor HPI: 48 yo female w/ PMHx of UC s/p total colectomy, DCIS s/p lumpectomy and radiation, h/o unprovoked PE and prothrombin gene mutation (heterozygous) and w/ symptomatic uterine fibroids s/p RATLH BSO cystoscopy, vaginal laceration repair on 9/17, post operative period complicated by N/V, ileus vs SBO, s/p NG tube and fever of unknown origin with septic symptoms, s/p failed IR drainage of collection noted on CT, s/p exploratory laparotomy for abdominal washout w/ removal of necrotic fibroids x2 (confirmed by frozen, 3 x 3 cm and 5 x 4 cm) and NEMO, upgraded to SICU for intraoperative hypotension and sepsis monitoring.  In the SICU, patient's blood pressure has been managed with fluid boluses. Urine output has been stable. An episode of hyperkalemia was treated with dextrose and insulin.  Her hemoglobin has been downtrending, but no signs of bleeding has been identified.  She was transfused total of four units of PRBCs since 10/1 w/ recovery of hemoglobin from 7.1 to 7.8.  She was on a PCA for pain running through EJ line, but because of infiltration of IV line in the right arm, the PCA was discontinued and used to run medication/fluids. Eventually on 10/2 the EJ line infiltrated as well and a TLC on the left was placed successfully. Primary team requests NGT to stay in place. Crespo and A line have been discontinued. Patient will be downgraded with TLC as well.      PHYSICAL EXAM:    General/Neuro  Alert & oriented x 3, no focal deficits    Lungs: Clear to auscultation, Normal expansion/effort.     Cardiovascular: S1, S2.  Normotensive. Nontachycardic. Regular rate and rhythm.    Cardiac Rhythm: Normal Sinus Rhythm    GI: Abdomen soft, Non-tender, Non-distended.  NGT tube in place.   Wound: Midline as well as left lateral abdominal incisions c/d/i. ADONIS drain in RLQ and LLQ with SS output.     Extremities: Extremities warm, pink, well-perfused.     Derm: Good skin turgor, no skin breakdown.      : No Crespo.     CXR: Unchanged bilateral opacities/effusions.  Left IJ central venous catheter with tip terminating in the SVC. Stable   enteric tube.    ASSESSMENT:   48 yo female w/ PMHx of UC s/p total colectomy, DCIS s/p lumpectomy and radiation, h/o unprovoked PE and prothombin gene mutation (heterozygous) and w/ symptomatic uterine fibroids s/p RATLH BSO cystoscopy, vaginal laceration repair on 9/17, post operative period complicated by N/V, ileus vs SBO, s/p NG tube and fever of unknown origin, s/p failed IR drainage of collection noted on CT, s/pexploratory laparotomy w/ removal of necrotic fibroids x2 (confirmed by frozen, 3 x 3 cm and 5 x 4 cm) and NEMO, upgraded to SICU for hypotension and sepsis monitoring.    PLAN:   Neurologic:   -A&O x3  -Pain control w/ diluadid PRN  -Nausea: Reglan  -Hx of anxiety and [place] Palsy    Respiratory:   -RA, maintain 02 sat >94%  -Encourage IS/PT  -Hx of unprovoked  PE ( right lower lobe, 1/2012; unprovoked, positive for 1 copy of prothrombin [id number] mutation)  -AM CXR: B/l opacities and effusion, stable     Cardiovascular:   -Hypotensive and tachycardic intraop despite pressors and IV fluids   -Treated with fluid boluses for hypotension. No pressors needed.   -Hx of MVP    Gastrointestinal/Nutrition:  -NG tube in place - monitor output, to stay in as per primary team  -NPO, NS @ 125  -GI ppx: ptx  -hx of UC, s/p colectomy & reversal  -hx of ? liver disease, hemangioma?     Renal/Genitourinary:  -Crespo discontinued. Passed TOV, 100cc and 50 cc  -Lytes-Na 132 // K 4.9 // Phos 3.1 //  Mag 2.2    GYN:  -Small defect in vaginal cuff noted intraop, may have small vaginal discharge   -hx of Breast CA, s/p L lumpectomy w/ radiation (2017)    Hematologic:  -Hx of PE   -DVT ppx: SCDs, Lovenox  -Baseline H&H 10.6/34.0, f/u H&H    Acute blood loss anemia   -Received total of 4 u PRBCs since 10/1, last unit 10/3 am  -Hgb: 6.3 >6.2>7.1>7.8    Infectious Disease:   -Leukocytosis wbc downtrending from 40 to 15  -Afebrile (last temp: 9/30: 100.8 // tmax: 9/19: 103.5)  -On cefepime 2 grams q8hrs, vancomycin 1250 mL q12hrs and flagyl 500 mg q8hrs per ID recommendation  -Vanc trough 13.1  -Latest cultures done on 9/29 midline catheter tip culture prelim neg, blood culture prelim neg, urine culture contaminated, f/u final BCx and catheter culture  -F/u cultures from ex lap on 10/1  -F/u pathology from ex lap on 10/1    Endocrine:   -FS q6hrs while NPO  -no chronic diagnosis    Lines/Tubes:   -TLC 10/2  -R radial A line placed on 10/1---d/c'd  -Crespo catheter---d/c'd  -ADONIS drain x2 (R right paracolic gutter, L pelvis)      Disposition: Downgrade to floor.    Signed out to Dr Lee @ 2:25 PM

## 2019-10-03 NOTE — PROGRESS NOTE ADULT - SUBJECTIVE AND OBJECTIVE BOX
GENERAL SURGERY PROGRESS NOTE     MARCELLUS WILSON  49y  Female  Hospital day :16d  POD:  Procedure: Surgical removal of intra-abdominal or retroperitoneal mass greater than 10 cm in diameter  Abdominal washout  Surgical removal of intra-abdominal or retroperitoneal mass greater than 10 cm in diameter  Lysis, adhesions, intestine  Exploratory laparotomy  Lysis, adhesions, pelvis, laparoscopic  Cystoscopy, diagnostic  Laparoscopic total hysterectomy with salpingo-oophorectomy for uterus greater than 250 grams    OVERNIGHT EVENTS: Got one unit for hb of 6.3 repeated immediately and was 6.2). After 1U hb = 7.1. Pain improved. Patient making good urine.  overnight. ADONIS: 60 on right and 50 on left over past day.     T(F): 98.8 (10-03-19 @ 10:53), Max: 99.3 (10-03-19 @ 10:38)  HR: 90 (10-03-19 @ 10:53) (85 - 106)  BP: 104/45 (10-03-19 @ 08:00) (97/50 - 135/49)  ABP: 122/53 (10-03-19 @ 10:53) (90/50 - 133/55)  ABP(mean): 72 (10-03-19 @ 10:53) (64 - 75)  RR: 22 (10-03-19 @ 10:53) (12 - 22)  SpO2: 100% (10-03-19 @ 10:53) (96% - 100%)    DIET/FLUIDS: dextrose 10%. 1000 milliLiter(s) IV Continuous <Continuous>  sodium chloride 0.9%. 1000 milliLiter(s) IV Continuous <Continuous>    NG:   10-02-19 @ 07:01  -  10-03-19 @ 07:00  --------------------------------------------------------  OUT: 200 mL                                                                                 DRAINS:   10-02-19 @ 07:01  -  10-03-19 @ 07:00  --------------------------------------------------------  OUT: 180 mL      BM:     EMESIS:     URINE:   10-02-19 @ 07:01  -  10-03-19 @ 07:00  --------------------------------------------------------  OUT: 980 mL       GI proph:  pantoprazole  Injectable 40 milliGRAM(s) IV Push daily    AC/ proph:   ABx: cefepime   IVPB 2000 milliGRAM(s) IV Intermittent every 8 hours  metroNIDAZOLE  IVPB 500 milliGRAM(s) IV Intermittent every 8 hours  vancomycin  IVPB 1250 milliGRAM(s) IV Intermittent every 12 hours  vancomycin  IVPB          PHYSICAL EXAM:  GENERAL: NAD, well-appearing  CHEST/LUNG: Clear to auscultation bilaterally  HEART: Regular rate and rhythm  ABDOMEN: Soft, midline tender, Nondistended; Bilateral Jps with SS fluid  EXTREMITIES:  No clubbing, cyanosis, or edema      LABS  Labs:  CAPILLARY BLOOD GLUCOSE                              7.1    15.54 )-----------( 655      ( 03 Oct 2019 07:05 )             22.3         10-03    132<L>  |  104  |  16  ----------------------------<  102<H>  4.9   |  21  |  0.5<L>      Calcium, Total Serum: 6.9 mg/dL (10-03-19 @ 00:25)      LFTs:     Lactate, Blood: 1.7 mmol/L (10-01-19 @ 17:30)  Blood Gas Arterial, Lactate: 2.1 mmoL/L (10-01-19 @ 16:17)    ABG - ( 01 Oct 2019 16:17 )  pH: 7.45  /  pCO2: 33    /  pO2: 91    / HCO3: 23    / Base Excess: -1.0  /  SaO2: 98                Coags:     18.80  ----< 1.64    ( 03 Oct 2019 00:25 )     32.1                    Culture - Body Fluid with Gram Stain (collected 01 Oct 2019 15:00)  Source: .Body Fluid None  Gram Stain (02 Oct 2019 09:54):    polymorphonuclear leukocytes seen    No organisms seen by cytocentrifuge          RADIOLOGY & ADDITIONAL TESTS:      < from: Xray Chest 1 View- PORTABLE-Routine (10.02.19 @ 05:16) >  Impression:      Increased left and unchanged right basilar opacities.    Enteric tube courses below the diaphragm.    < end of copied text >

## 2019-10-03 NOTE — PROGRESS NOTE ADULT - ASSESSMENT
· Assessment		  48yo P1 h/o UC s/p total colectomy, h/o DCIS s/p lumpectomy and radiation, h/o unprovoked PE and prothombin gene mutation, hx of fibroids s/p RATLH BSO cystoscopy, vaginal laceration repair, EBL 250cc, POD 15, complicated by SBO and pelvic abscess, leukocytosis, recurrent fevers,   s/p failed IR drainage.   10/1 ex lap, removal of pelvic massx2 by gen surg, abdominal washout, EBL 50cc, upgraded to SICU due to hypotension and decreased UO in the OR for hemodynamic monitoring.  Post op recovering well, currently hemodynamically and clinically stable  BCx 9/29 NG  OR cultures pending    RECOMMENDATIONS:  F/u OR cultures  Vanco trough please  Vancomycin 1250 mg iv q12h  Cefepime 2 gm iv q8h  Flagyl 500 mg iv q8h

## 2019-10-03 NOTE — PROGRESS NOTE ADULT - ASSESSMENT
50yo P1 h/o ulcerative colitis with total colectomy, h/o DCIS s/p lumpectomy with radiation, h/o unprovoked PE found to have prothrombin mutation (heterozygous), with fibroid uterus and pelvic pain s/p RATLH, BSO, cystoscopy, EBL 250cc, complicated by postoperative ileus vs SBO infected pelvic mass s/p reop exlap with removal of abdominal massx2 and abdominal wash out, POD#0  GI: NPO IVF NS 125cc/hr  DVT prophylaxis: lovenox, SCDs  Neuro: pain control  Cardio:   Pulm: incentive spirometer, bilateral basilar opacities, improving on CXR  ID: afebrile, WBC trending down 30.94->20.49.16.95 c/w abx vanco, cefepime, flagyl; ID following, f/u pending cultures  Endo: no dx  Heme: H/H trending down, Hb 7.6->6.3, s/p 1u PRBCs, Hb 6.2, consider 2u PRBCs, ADONIS output serosanguious  : UO picking up, clear, no vaginal gross vaginal bleeding    Will inform Dr Wayne 50yo P1 h/o ulcerative colitis with total colectomy, h/o DCIS s/p lumpectomy with radiation, h/o unprovoked PE found to have prothrombin mutation (heterozygous), with fibroid uterus and pelvic pain s/p RATLH, BSO, cystoscopy, EBL 250cc, complicated by postoperative ileus vs SBO infected pelvic mass s/p reop exlap with removal of abdominal massx2 and abdominal wash out, POD#0  GI: NPO IVF NS 125cc/hr  DVT prophylaxis: lovenox, SCDs  Neuro: pain control  Pulm: incentive spirometer, bilateral basilar opacities, improving on CXR  ID: afebrile, WBC trending down 30.94->20.49.16.95 c/w abx vanco, cefepime, flagyl; ID following, f/u pending cultures  Endo: no dx  Heme: H/H trending down, Hb 7.6->6.3, s/p 1u PRBCs, Hb 6.2, consider 2u PRBCs, ADONIS output serosanguious  : UO picking up, clear, no vaginal gross vaginal bleeding    Will inform Dr Wayne 48yo P1 h/o ulcerative colitis with total colectomy, h/o DCIS s/p lumpectomy with radiation, h/o unprovoked PE found to have prothrombin mutation (heterozygous), with fibroid uterus and pelvic pain s/p RATLH, BSO, cystoscopy, EBL 250cc, complicated by postoperative ileus vs SBO infected pelvic mass s/p reop exlap with removal of abdominal massx2 and abdominal wash out,   POD#2/16  GI: NPO IVF NS 125cc/hr  DVT prophylaxis: lovenox, SCDs  Neuro: pain control  Pulm: incentive spirometer, bilateral basilar opacities, improving on CXR  ID: afebrile, WBC trending down 30.94->20.49.16.95 c/w abx vanco, cefepime, flagyl; ID following, f/u pending cultures  Endo: no dx  Heme: H/H trending down, Hb 7.6->6.3, s/p 1u PRBCs, Hb 6.2, consider 2u PRBCs, ADONIS output serosanguious  : UO picking up, clear, no gross vaginal bleeding    Will inform Dr Wayne

## 2019-10-03 NOTE — PROGRESS NOTE ADULT - ASSESSMENT
· Assessment		  50 y/o F POD 16 s/p RA TLH, BSO w/ cystoscopy for findings of extensive uterine fibroids with post op course c/b ileus vs SBO , Pyrexia and leukocytosis w/findings of what appeared to be an abscess on imaging . Course further c/b failed IR drainage 2/2 firmness of collection appreciated upon attempt at drainage which prompted a re-operation. She is currently POD 2 s/p exlap/ removal of right pericolic necrotic mass x2 abdominal mass x2 with intraop course c/b hypotension, tachycardia and oliguria. Upgraded to SICU in SIRS with an obvious source of infection ( SOFA score 1), for close monitoring due to the concern for development of sepsis/septic shock     PLAN:   Neurologic:   -A&O x3  -Pain control w/ diluadid  -Hx of anxiety -   -Hx of [place] Palsy    Respiratory:   -RA, maintain 02 sat >94%  -Encourage IS/PT  -Hx of unprovoked  PE ( right lower lobe, 1/2012; unprovocked, positive for 1 copy of prothrombin [id number] mutation)  -AM CXR    Cardiovascular:   -Hypotensive and tachycardic intraop despite pressors and IV fluids - continue with IVF, if unresponsive to fluids, will start pressors  -Hx of MVP    Gastrointestinal/Nutrition:  -NG tube in place - monitor output  -NPO, LR @ 125  -GI ppx: ptx  -hx of UC, s/p colectomy & reversal  -hx of ? liver disease, hemangioma?     Renal/Genitourinary:  -Crespo in place, monitor UO - strict I&Os  -Monitor electrolytes and replete if needed    GYN:  -Small defect in vaginal cuff noted intraop, may have small vaginal discharge   -hx of Breast CA, s/p L lumpectomy w/ radiation (2017)    Hematologic:  -Hx of PE   -DVT ppx: SCDs, Lovenox  -Baseline H&H 10.6/34.0, f/u H&H    Acute blood loss anemia   - Hg 6.2, will recieve 1UPRBC    Infectious Disease:   -Leukocytosis   -Afebrile (last temp: 9/30: 100.8 // tmax: 9/19: 103.5)  -On cefepime 2 grams q8hrs, vancomycin 1250 mL q12hrs and flagyl 500 mg q8hrs per ID recommendation  -Latest cultures done on 9/29 midline catheter tip culture prelim neg, blood culture prelim neg, urine culture contaminated, f/u final BCx and catheter culture  -F/u cultures from ex lap on 10/1  -F/u pathology from ex lap on 10/1    Endocrine:   -FS q6hrs while NPO  -no chronic diagnosis    Lines/Tubes:   - TLC 10/2  -R radial A line placed on 10/1  -Crespo catheter  -ADONIS drain x2 (R right paracolic gutter, L pelvis)      Disposition: SICU

## 2019-10-03 NOTE — PROGRESS NOTE ADULT - ATTENDING COMMENTS
· Assessment	  50 y/o F POD 16 s/p RA TLH, BSO w/ cystoscopy for findings of extensive uterine fibroids with post op course c/b ileus vs SBO , Pyrexia and leukocytosis w/findings of what appeared to be an abscess on imaging . Course further c/b failed IR drainage 2/2 firmness of collection appreciated upon attempt at drainage which prompted a re-operation. She is currently POD 2 s/p exlap/ removal of right pericolic necrotic mass x2 abdominal mass x2 with intraop course c/b hypotension, tachycardia and oliguria. Upgraded to SICU in SIRS with an obvious source of infection ( SOFA score 1), for close monitoring due to the concern for development of sepsis/septic shock     PLAN:   Neurologic:   -A&O x3  -Pain control w/ diluadid  -Hx of anxiety -   -Hx of Bell's Palsy    Respiratory:   -RA, maintain 02 sat >94%  -Encourage IS/PT  -Hx of unprovoked  PE ( right lower lobe, 1/2012; unprovocked, positive for 1 copy of prothrombin [id number] mutation)  -AM CXR    Cardiovascular:   -Hypotensive and tachycardic intraop despite pressors and IV fluids - continue with IVF, if unresponsive to fluids, will start pressors  -Hx of MVP    Gastrointestinal/Nutrition:  -NG tube in place - monitor output  -NPO, LR @ 125  -GI ppx: ptx  -hx of UC, s/p colectomy & reversal  -hx of ? liver disease, hemangioma?     Renal/Genitourinary:  -Crespo in place, monitor UO - strict I&Os  -Monitor electrolytes and replete if needed    GYN:  -Small defect in vaginal cuff noted intraop, may have small vaginal discharge   -hx of Breast CA, s/p L lumpectomy w/ radiation (2017)    Hematologic:  -Hx of PE   -DVT ppx: SCDs, Lovenox  -Baseline H&H 10.6/34.0, f/u H&H    Acute blood loss anemia   - Hg 6.2, will recieve 1UPRBC    Infectious Disease:   -Leukocytosis   -Afebrile (last temp: 9/30: 100.8 // tmax: 9/19: 103.5)  -On cefepime 2 grams q8hrs, vancomycin 1250 mL q12hrs and flagyl 500 mg q8hrs per ID recommendation  -Latest cultures done on 9/29 midline catheter tip culture prelim neg, blood culture prelim neg, urine culture contaminated, f/u final BCx and catheter culture  -F/u cultures from ex lap on 10/1  -F/u pathology from ex lap on 10/1    Endocrine:   -FS q6hrs while NPO  -no chronic diagnosis    Lines/Tubes:   - TLC 10/2  -R radial A line placed on 10/1  -Crespo catheter  -ADONIS drain x2 (R right paracolic gutter, L pelvis)      Disposition: SICU

## 2019-10-03 NOTE — PROGRESS NOTE ADULT - ATTENDING COMMENTS
48yo female with PMHx/PSHx of total colectomy with primary anastomosis for UC s/p robotic total hysterectomy with salphino-oophorectomy, extensive NEMO 9/17 now with ileus versus obstruction. CT A/P from 9/20 demonstrates diffuse small bowel dilatation without transition point, large hyperattenuating right lower quadrant intraperitoneal structure with gas - hematoma versus hemostatic material, and extensive soft tissue emphysema. NG tube was placed. Patient states she is feeling well, +BM, +flatus. Denies nausea/vomiting, chest pain or SOB. Exam is soft, right sided tenderness, well-healing wounds, mildly distended, no rebound/guarding. Repeat CT A/P demonstrates 6x8cm collection in right abdomen. Recommend IR drainage. Continue antibiotics. Serial labs - leukocytosis downtrending from 40 to 16. Serial abdominal exams. Encourage ambulation/OOB. Primary care as per primary team. Drop in H/H, given 2U pRBC.

## 2019-10-03 NOTE — PROGRESS NOTE ADULT - ASSESSMENT
Patient is a 49y old Female s/p exploratory laparotomy with abdominal washout, lysis of adhesions, removal of retained fibroid x2    Plan:   - trend hb and transfuse PRN   - IS  - Care per ICU   - Pain management   - monitor ADONIS drain output

## 2019-10-03 NOTE — PROGRESS NOTE ADULT - SUBJECTIVE AND OBJECTIVE BOX
MARCELLUS WILSON  49y, Female      OVERNIGHT EVENTS:    no fevers, no cough/SOB, no overt abdominal pain    VITALS:  T(F): 98.8, Max: 99.2 (10-02-19 @ 20:00)  HR: 85  BP: 111/54  RR: 18Vital Signs Last 24 Hrs  T(C): 37.1 (03 Oct 2019 04:00), Max: 37.3 (02 Oct 2019 20:00)  T(F): 98.8 (03 Oct 2019 04:00), Max: 99.2 (02 Oct 2019 20:00)  HR: 85 (03 Oct 2019 05:00) (78 - 106)  BP: 111/54 (03 Oct 2019 05:00) (97/50 - 135/49)  BP(mean): 76 (03 Oct 2019 05:00) (59 - 90)  RR: 18 (03 Oct 2019 05:00) (12 - 20)  SpO2: 100% (03 Oct 2019 05:00) (96% - 100%)    TESTS & MEASUREMENTS:                        6.2    16.95 )-----------( 635      ( 03 Oct 2019 01:00 )             19.4     10-03    132<L>  |  104  |  16  ----------------------------<  102<H>  4.9   |  21  |  0.5<L>    Ca    6.9<L>      03 Oct 2019 00:25  Phos  3.1     10-02  Mg     2.2     10-02          Culture - Body Fluid with Gram Stain (collected 10-01-19 @ 15:00)  Source: .Body Fluid None  Gram Stain (10-02-19 @ 09:54):    polymorphonuclear leukocytes seen    No organisms seen by cytocentrifuge    Culture - Catheter (collected 09-29-19 @ 18:00)  Source: .Catheter PICC Tip  Final Report (10-02-19 @ 13:42):    No growth at 48 hours    Culture - Urine (collected 09-29-19 @ 09:29)  Source: .Urine Clean Catch (Midstream)  Final Report (10-01-19 @ 15:43):    >=3 organisms. Probable collection contamination.    Culture - Blood (collected 09-29-19 @ 09:07)  Source: .Blood Blood-Venous  Preliminary Report (09-30-19 @ 17:01):    No growth to date.            RADIOLOGY & ADDITIONAL TESTS:    ANTIBIOTICS:  cefepime   IVPB 2000 milliGRAM(s) IV Intermittent every 8 hours  metroNIDAZOLE  IVPB 500 milliGRAM(s) IV Intermittent every 8 hours  vancomycin  IVPB 1250 milliGRAM(s) IV Intermittent every 12 hours  vancomycin  IVPB

## 2019-10-03 NOTE — PROGRESS NOTE ADULT - SUBJECTIVE AND OBJECTIVE BOX
MARCELLUS WILSON  [id number]  49y Female    Indication for ICU admission: hemodynamic monitoring s/p exlap, washout, removal of intraabdominal or retroperitoneal mass    Admit Date:09-17-19  ICU Date: 10/1/19  OR Date: 10/1/19      codeine (Vomiting; Nausea)  latex (Anaphylaxis)  penicillin (Other)    PAST MEDICAL & SURGICAL HISTORY:  OA (osteoarthritis)  Anxiety  Breast cancer: left breast cancer,, lumpectomy&amp;  radiation (~2017)  S/P colectomy: hx ulcerative colitis  Liver disease: &quot;twisted liver&quot;, hemangioma  MVP (mitral valve prolapse)  Bell palsy  Pulmonary embolism: &gt; 5 yrs ago, no current meds, pt is unsure of cause  Breast cancer: left side, lumpectomy  H/O colectomy: colostom&amp; reversal pt sttaes pouch is on the left side  S/P lumpectomy, left breast    Home Medications:        24HRS EVENT: tachycardic hypotensive- responded s/p 500 cc bolus IVF, HG decreased to 6.2 ( on repeat), currently getting 1U PRBC, adequate UO, TLC placed     Neurologic:  A & O x3  -Morphine PRN  -Hx of anxiety  -Hx of Bell's palsy    Respiratory:   -On NC @ 2L, satting well  -Hx of PE    Cardiovascular:   -Hypotensive and tachycardic intraop despite pressors and IV fluids - IV fluids if hypovolemic, if not plan is to place a central line and start pressors   -Hx of MVP  -EKG: NSR  -Stress test 3/2019: NSR w/ incomplete RBBB    Gastrointestinal/Nutrition:  -NG tube in place  -NPO, LR @ 125  -GI ppx: ptx  -hx of UC, s/p colectomy and reversal     Renal/Genitourinary:  -Crespo in place. UOP 35-40cc  -Cr stable @ baseline  -Monitor electrolytes and replete if needed  -Hyperkalemia tx w/ dextrose/insulin    GYN:  -Small defect in vaginal cuff noted intraop, might have small vaginal discharge   -hx of Breast CA, s/p L lumpectomy w/ radiation    Hematologic:   -Hgb 10 > 4.8, now stable  -DVT ppx: SCDs, Lovenox    Infectious Disease:   -WBC downtrending 40>34>30         -Afebrile (Tmax 103.5 @1200 on 9/19; Last temp: 9/30 100.8 @1228)  -On cefepime 2 grams q8hrs, vancomycin 1250 mL q12hrs and flagyl 500 mg q8hrs per ID recommendation  -Latest cultures done on 9/29 midline catheter tip culture prelim neg, blood culture prelim neg, urine culture contaminated, f/u final BCx and catheter culture  -F/u cultures from ex lap on 10/1  -F/u pathology from ex lap on 10/1    Endocrine:   -NPO, FS q6hrs    Lines/Tubes:   -R radial A line placed on 10/1  -Crespo catheter  -ADONIS drain x2 (R right paracolic gutter, L pelvis)  -L IJ TLC 10/2  -EJ peripheral line placed on 9/29 - blew d/c'd 10/2      Disposition: SICU      DVT PTX: SCDs, LVX    GI PTX: ptx    ***Tubes/Lines/Drains  ***  Peripheral IV - R EJ 22g           Urinary Catheter		Indication: Strict I&O    Date Placed:       Critical Care Diagnoses: Monitor for Sepsis           REVIEW OF SYSTEMS    [ x] A ten-point review of systems was otherwise negative except as noted.  [ ] Due to altered mental status/intubation, subjective information were not able to be obtained from the patient. History was obtained, to the extent possible, from review of the chart and collateral sources of information. MARCELLUS WILSON  [id number]  49y Female    Indication for ICU admission: hemodynamic monitoring s/p exlap, washout, removal of intraabdominal or retroperitoneal mass    Admit Date:09-17-19  ICU Date: 10/1/19  OR Date: 10/1/19      codeine (Vomiting; Nausea)  latex (Anaphylaxis)  penicillin (Other)    PAST MEDICAL & SURGICAL HISTORY:  OA (osteoarthritis)  Anxiety  Breast cancer: left breast cancer,, lumpectomy&amp;  radiation (~2017)  S/P colectomy: hx ulcerative colitis  Liver disease: &quot;twisted liver&quot;, hemangioma  MVP (mitral valve prolapse)  Bell palsy  Pulmonary embolism: &gt; 5 yrs ago, no current meds, pt is unsure of cause  Breast cancer: left side, lumpectomy  H/O colectomy: colostom&amp; reversal pt sttaes pouch is on the left side  S/P lumpectomy, left breast    Home Medications:        24HRS EVENT: tachycardic hypotensive- responded s/p 500 cc bolus IVF, HG decreased to 6.2 ( on repeat), currently getting 1U PRBC, adequate UO, TLC placed     Neurologic:  A & O x3  -Morphine PRN  -Hx of anxiety  -Hx of Bell's palsy    Respiratory:   -On NC @ 2L, satting well  -Hx of PE    Cardiovascular:   -Hypotensive and tachycardic intraop despite pressors and IV fluids - IV fluids if hypovolemic, if not plan is to place a central line and start pressors   -Hx of MVP  -EKG: NSR  -Stress test 3/2019: NSR w/ incomplete RBBB    Gastrointestinal/Nutrition:  -NG tube in place  -NPO, LR @ 125  -GI ppx: ptx  -hx of UC, s/p colectomy and reversal     Renal/Genitourinary:  -Crespo in place. UOP 35-40cc  -Cr stable @ baseline  -Monitor electrolytes and replete if needed  -Hyperkalemia tx w/ dextrose/insulin    GYN:  -Small defect in vaginal cuff noted intraop, might have small vaginal discharge   -hx of Breast CA, s/p L lumpectomy w/ radiation    Hematologic:   -Hgb 10 > 4.8, now stable  -DVT ppx: SCDs, Lovenox    Infectious Disease:   -WBC downtrending 40>34>30         -Afebrile (Tmax 103.5 @1200 on 9/19; Last temp: 9/30 100.8 @1228)  -On cefepime 2 grams q8hrs, vancomycin 1250 mL q12hrs and flagyl 500 mg q8hrs per ID recommendation  -Latest cultures done on 9/29 midline catheter tip culture prelim neg, blood culture prelim neg, urine culture contaminated, f/u final BCx and catheter culture  -F/u cultures from ex lap on 10/1  -F/u pathology from ex lap on 10/1    Endocrine:   -NPO, FS q6hrs    Lines/Tubes:   -R radial A line placed on 10/1  -Crespo catheter  -ADONIS drain x2 (R right paracolic gutter, L pelvis)  -L IJ TLC 10/2  -EJ peripheral line placed on 9/29 - blew d/c'd 10/2      Disposition: SICU      DVT PTX: SCDs, LVX    GI PTX: ptx    ***Tubes/Lines/Drains  ***  Peripheral IV - R EJ 22g           Urinary Catheter		Indication: Strict I&O    Date Placed:       Critical Care Diagnoses: Monitor for Sepsis           REVIEW OF SYSTEMS    [ x] A ten-point review of systems was otherwise negative except as noted.  [ ] Due to altered mental status/intubation, subjective information were not able to be obtained from the patient. History was obtained, to the extent possible, from review of the chart and collateral sources of information.    Daily     Daily     Diet, NPO (10-01-19 @ 17:16)      CURRENT MEDS:  Neurologic Medications  HYDROmorphone  Injectable 0.5 milliGRAM(s) IV Push every 4 hours PRN Moderate Pain (4 - 6)  HYDROmorphone  Injectable 1 milliGRAM(s) IV Push every 4 hours PRN Severe Pain (7 - 10)  metoclopramide Injectable 10 milliGRAM(s) IV Push every 6 hours PRN Nausea and/or Vomiting  ondansetron Injectable 4 milliGRAM(s) IV Push every 6 hours PRN Nausea    Respiratory Medications    Cardiovascular Medications    Gastrointestinal Medications  dextrose 10%. 1000 milliLiter(s) IV Continuous <Continuous>  pantoprazole  Injectable 40 milliGRAM(s) IV Push daily  sodium chloride 0.9%. 1000 milliLiter(s) IV Continuous <Continuous>    Genitourinary Medications    Hematologic/Oncologic Medications  enoxaparin Injectable 40 milliGRAM(s) SubCutaneous daily    Antimicrobial/Immunologic Medications  cefepime   IVPB 2000 milliGRAM(s) IV Intermittent every 8 hours  metroNIDAZOLE  IVPB 500 milliGRAM(s) IV Intermittent every 8 hours  vancomycin  IVPB 1250 milliGRAM(s) IV Intermittent every 12 hours  vancomycin  IVPB        Endocrine/Metabolic Medications    Topical/Other Medications  naloxone Injectable 0.1 milliGRAM(s) IV Push every 3 minutes PRN For ANY of the following changes in patient status:  A. RR LESS THAN 10 breaths per minute, B. Oxygen saturation LESS THAN 90%, C. Sedation score of 6      ICU Vital Signs Last 24 Hrs  T(C): 37.1 (03 Oct 2019 04:00), Max: 37.3 (02 Oct 2019 20:00)  T(F): 98.8 (03 Oct 2019 04:00), Max: 99.2 (02 Oct 2019 20:00)  HR: 92 (03 Oct 2019 06:00) (78 - 106)  BP: 110/54 (03 Oct 2019 06:00) (97/50 - 135/49)  BP(mean): 70 (03 Oct 2019 06:00) (59 - 90)  ABP: 103/45 (03 Oct 2019 06:00) (90/50 - 114/54)  ABP(mean): 64 (03 Oct 2019 06:00) (64 - 72)  RR: 18 (03 Oct 2019 06:00) (12 - 20)  SpO2: 98% (03 Oct 2019 06:00) (96% - 100%)      Adult Advanced Hemodynamics Last 24 Hrs  CVP(mm Hg): --  CVP(cm H2O): --  CO: --  CI: --  PA: --  PA(mean): --  PCWP: --  SVR: --  SVRI: --  PVR: --  PVRI: --      ABG - ( 01 Oct 2019 16:17 )  pH, Arterial: 7.45  pH, Blood: x     /  pCO2: 33    /  pO2: 91    / HCO3: 23    / Base Excess: -1.0  /  SaO2: 98                  I&O's Summary    02 Oct 2019 07:01  -  03 Oct 2019 07:00  --------------------------------------------------------  IN: 4763 mL / OUT: 1360 mL / NET: 3403 mL      I&O's Detail    02 Oct 2019 07:01  -  03 Oct 2019 07:00  --------------------------------------------------------  IN:    dextrose 10%.: 250 mL    IV PiggyBack: 950 mL    Lactated Ringers IV Bolus: 200 mL    Packed Red Blood Cells: 322 mL    Sodium Chloride 0.9% IV Bolus: 500 mL    sodium chloride 0.9%.: 2541 mL  Total IN: 4763 mL    OUT:    Bulb: 100 mL    Bulb: 80 mL    Indwelling Catheter - Urethral: 980 mL    Nasoenteral Tube: 200 mL  Total OUT: 1360 mL    Total NET: 3403 mL          PHYSICAL EXAM:    General/Neuro  alert & oriented x 3, no focal deficits  Pupils: PERRLA, EOMI    Lungs:      clear to auscultation, Normal expansion/effort.     Cardiovascular : S1, S2.  Regular rate and rhythm.    Cardiac Rhythm: Normal Sinus Rhythm    GI: Abdomen soft, Non-tender, Non-distended.    Nasogastric tube in place.    Wound: c/d/i    Extremities: Extremities warm, pink, well-perfused. Pulses palp b/l    Derm: Good skin turgor, no skin breakdown.      :       Crespo catheter in place.      CXR:       LABS:  CAPILLARY BLOOD GLUCOSE                              7.1    15.54 )-----------( 655      ( 03 Oct 2019 07:05 )             22.3         10-03    132<L>  |  104  |  16  ----------------------------<  102<H>  4.9   |  21  |  0.5<L>    Ca    6.9<L>      03 Oct 2019 00:25  Phos  3.1     10-02  Mg     2.2     10-02        PT/INR - ( 03 Oct 2019 00:25 )   PT: 18.80 sec;   INR: 1.64 ratio         PTT - ( 03 Oct 2019 00:25 )  PTT:32.1 sec          Culture - Body Fluid with Gram Stain (collected 01 Oct 2019 15:00)  Source: .Body Fluid None  Gram Stain (02 Oct 2019 09:54):    polymorphonuclear leukocytes seen    No organisms seen by cytocentrifuge

## 2019-10-04 ENCOUNTER — APPOINTMENT (OUTPATIENT)
Dept: OBGYN | Facility: CLINIC | Age: 49
End: 2019-10-04

## 2019-10-04 LAB
-  AMIKACIN: SIGNIFICANT CHANGE UP
-  AMOXICILLIN/CLAVULANIC ACID: SIGNIFICANT CHANGE UP
-  AMPICILLIN/SULBACTAM: SIGNIFICANT CHANGE UP
-  AMPICILLIN: SIGNIFICANT CHANGE UP
-  AZTREONAM: SIGNIFICANT CHANGE UP
-  CEFAZOLIN: SIGNIFICANT CHANGE UP
-  CEFEPIME: SIGNIFICANT CHANGE UP
-  CEFOXITIN: SIGNIFICANT CHANGE UP
-  CEFTRIAXONE: SIGNIFICANT CHANGE UP
-  CIPROFLOXACIN: SIGNIFICANT CHANGE UP
-  ERTAPENEM: SIGNIFICANT CHANGE UP
-  GENTAMICIN: SIGNIFICANT CHANGE UP
-  IMIPENEM: SIGNIFICANT CHANGE UP
-  LEVOFLOXACIN: SIGNIFICANT CHANGE UP
-  MEROPENEM: SIGNIFICANT CHANGE UP
-  PIPERACILLIN/TAZOBACTAM: SIGNIFICANT CHANGE UP
-  TOBRAMYCIN: SIGNIFICANT CHANGE UP
-  TRIMETHOPRIM/SULFAMETHOXAZOLE: SIGNIFICANT CHANGE UP
ANION GAP SERPL CALC-SCNC: 9 MMOL/L — SIGNIFICANT CHANGE UP (ref 7–14)
BASOPHILS # BLD AUTO: 0.03 K/UL — SIGNIFICANT CHANGE UP (ref 0–0.2)
BASOPHILS NFR BLD AUTO: 0.3 % — SIGNIFICANT CHANGE UP (ref 0–1)
BUN SERPL-MCNC: 9 MG/DL — LOW (ref 10–20)
CALCIUM SERPL-MCNC: 7.2 MG/DL — LOW (ref 8.5–10.1)
CHLORIDE SERPL-SCNC: 108 MMOL/L — SIGNIFICANT CHANGE UP (ref 98–110)
CO2 SERPL-SCNC: 20 MMOL/L — SIGNIFICANT CHANGE UP (ref 17–32)
CREAT SERPL-MCNC: 0.5 MG/DL — LOW (ref 0.7–1.5)
CULTURE RESULTS: SIGNIFICANT CHANGE UP
CULTURE RESULTS: SIGNIFICANT CHANGE UP
EOSINOPHIL # BLD AUTO: 0.06 K/UL — SIGNIFICANT CHANGE UP (ref 0–0.7)
EOSINOPHIL NFR BLD AUTO: 0.5 % — SIGNIFICANT CHANGE UP (ref 0–8)
GLUCOSE SERPL-MCNC: 95 MG/DL — SIGNIFICANT CHANGE UP (ref 70–99)
HCT VFR BLD CALC: 25.5 % — LOW (ref 37–47)
HGB BLD-MCNC: 8 G/DL — LOW (ref 12–16)
IMM GRANULOCYTES NFR BLD AUTO: 6.1 % — HIGH (ref 0.1–0.3)
LYMPHOCYTES # BLD AUTO: 0.85 K/UL — LOW (ref 1.2–3.4)
LYMPHOCYTES # BLD AUTO: 7.5 % — LOW (ref 20.5–51.1)
MAGNESIUM SERPL-MCNC: 1.8 MG/DL — SIGNIFICANT CHANGE UP (ref 1.8–2.4)
MCHC RBC-ENTMCNC: 25.6 PG — LOW (ref 27–31)
MCHC RBC-ENTMCNC: 31.4 G/DL — LOW (ref 32–37)
MCV RBC AUTO: 81.5 FL — SIGNIFICANT CHANGE UP (ref 81–99)
METHOD TYPE: SIGNIFICANT CHANGE UP
MONOCYTES # BLD AUTO: 1.12 K/UL — HIGH (ref 0.1–0.6)
MONOCYTES NFR BLD AUTO: 9.9 % — HIGH (ref 1.7–9.3)
NEUTROPHILS # BLD AUTO: 8.53 K/UL — HIGH (ref 1.4–6.5)
NEUTROPHILS NFR BLD AUTO: 75.7 % — HIGH (ref 42.2–75.2)
NRBC # BLD: 0 /100 WBCS — SIGNIFICANT CHANGE UP (ref 0–0)
ORGANISM # SPEC MICROSCOPIC CNT: SIGNIFICANT CHANGE UP
ORGANISM # SPEC MICROSCOPIC CNT: SIGNIFICANT CHANGE UP
PHOSPHATE SERPL-MCNC: 2.2 MG/DL — SIGNIFICANT CHANGE UP (ref 2.1–4.9)
PLATELET # BLD AUTO: 681 K/UL — HIGH (ref 130–400)
POTASSIUM SERPL-MCNC: 3.8 MMOL/L — SIGNIFICANT CHANGE UP (ref 3.5–5)
POTASSIUM SERPL-SCNC: 3.8 MMOL/L — SIGNIFICANT CHANGE UP (ref 3.5–5)
RBC # BLD: 3.13 M/UL — LOW (ref 4.2–5.4)
RBC # FLD: 18.3 % — HIGH (ref 11.5–14.5)
SODIUM SERPL-SCNC: 137 MMOL/L — SIGNIFICANT CHANGE UP (ref 135–146)
SPECIMEN SOURCE: SIGNIFICANT CHANGE UP
SPECIMEN SOURCE: SIGNIFICANT CHANGE UP
VANCOMYCIN TROUGH SERPL-MCNC: 13.6 UG/ML — HIGH (ref 5–10)
WBC # BLD: 11.28 K/UL — HIGH (ref 4.8–10.8)
WBC # FLD AUTO: 11.28 K/UL — HIGH (ref 4.8–10.8)

## 2019-10-04 PROCEDURE — 71045 X-RAY EXAM CHEST 1 VIEW: CPT | Mod: 26

## 2019-10-04 PROCEDURE — 99024 POSTOP FOLLOW-UP VISIT: CPT

## 2019-10-04 RX ORDER — VANCOMYCIN HCL 1 G
1500 VIAL (EA) INTRAVENOUS EVERY 12 HOURS
Refills: 0 | Status: DISCONTINUED | OUTPATIENT
Start: 2019-10-04 | End: 2019-10-04

## 2019-10-04 RX ORDER — VANCOMYCIN HCL 1 G
1500 VIAL (EA) INTRAVENOUS ONCE
Refills: 0 | Status: COMPLETED | OUTPATIENT
Start: 2019-10-04 | End: 2019-10-04

## 2019-10-04 RX ORDER — CIPROFLOXACIN LACTATE 400MG/40ML
400 VIAL (ML) INTRAVENOUS EVERY 12 HOURS
Refills: 0 | Status: DISCONTINUED | OUTPATIENT
Start: 2019-10-04 | End: 2019-10-06

## 2019-10-04 RX ADMIN — Medication 100 MILLIGRAM(S): at 05:29

## 2019-10-04 RX ADMIN — Medication 100 MILLIGRAM(S): at 14:03

## 2019-10-04 RX ADMIN — Medication 300 MILLIGRAM(S): at 10:17

## 2019-10-04 RX ADMIN — ENOXAPARIN SODIUM 40 MILLIGRAM(S): 100 INJECTION SUBCUTANEOUS at 12:04

## 2019-10-04 RX ADMIN — PANTOPRAZOLE SODIUM 40 MILLIGRAM(S): 20 TABLET, DELAYED RELEASE ORAL at 12:03

## 2019-10-04 RX ADMIN — Medication 10 MILLIGRAM(S): at 03:27

## 2019-10-04 RX ADMIN — ONDANSETRON 4 MILLIGRAM(S): 8 TABLET, FILM COATED ORAL at 00:22

## 2019-10-04 RX ADMIN — Medication 200 MILLIGRAM(S): at 18:10

## 2019-10-04 RX ADMIN — Medication 100 MILLIGRAM(S): at 22:30

## 2019-10-04 RX ADMIN — Medication 10 MILLIGRAM(S): at 18:24

## 2019-10-04 RX ADMIN — CEFEPIME 100 MILLIGRAM(S): 1 INJECTION, POWDER, FOR SOLUTION INTRAMUSCULAR; INTRAVENOUS at 05:30

## 2019-10-04 NOTE — PROGRESS NOTE ADULT - ASSESSMENT
48yo P1 h/o ulcerative colitis with total colectomy, h/o DCIS s/p lumpectomy with radiation, h/o unprovoked PE found to have prothrombin mutation (heterozygous), with fibroid uterus and pelvic pain s/p RATLH, BSO, cystoscopy, EBL 250cc, complicated by postoperative ileus vs SBO infected pelvic mass s/p reop exlap with removal of abdominal massx2 and abdominal wash out,   POD#3/17  GI: NPO, IVF NS 125cc/hr, maintain NG for now will discuss criteria for removal with surgery  DVT prophylaxis: lovenox, SCDs  Neuro: pain control  Pulm: bilateral basilar opacities stable on CXR, encourage incentive spirometer and ambulation  ID: afebrile, WBC trending down 30.94->20.49->16.95 -> 15.63 -> 11.28 c/w abx vanco, cefepime, flagyl; increased vanco to 1500mg will repeat trough, ID following, peritoneal fluid cx e.coli, cath tip neg, UCx contaminated, BCx negative  Endo: no dx  Heme: s/p 2u PRBCs yesterday, H/H trending up 7.1-> 7.8-> 8.0, ADONIS output serosanguious, will pull when <25cc  : UO picking up, clear, no vaginal bleeding    Will inform Dr Wayne

## 2019-10-04 NOTE — PROGRESS NOTE ADULT - ASSESSMENT
· Assessment		  50yo P1 h/o UC s/p total colectomy, h/o DCIS s/p lumpectomy and radiation, h/o unprovoked PE and prothombin gene mutation, hx of fibroids s/p RATLH BSO cystoscopy, vaginal laceration repair, EBL 250cc, POD 15, complicated by SBO and pelvic abscess, leukocytosis, recurrent fevers,   s/p failed IR drainage.   10/1 ex lap, removal of pelvic massx2 by gen surg, abdominal washout, EBL 50cc, upgraded to SICU due to hypotension and decreased UO in the OR for hemodynamic monitoring.  Post op recovering well, currently hemodynamically and clinically stable  BCx 9/29 NG  OR cultures E coli, Prevotella    RECOMMENDATIONS:  Cipro 400 mg iv q12h  Flagyl 500 mg iv q8h  D/c vancomycin/ cefepime  On discharge po Cipro 500 mg q12h and po Flagyl 500 mg q8h for 14 days starting 10/1  recall prn please

## 2019-10-04 NOTE — CHART NOTE - NSCHARTNOTEFT_GEN_A_CORE
Registered Dietitian Follow-Up     Patient Profile Reviewed                           Yes [x]   No []     Nutrition History Previously Obtained        Yes [x]  No []       Pertinent Medical Interventions: NGT removal by Surgery earlier this afternoon, plan to advance diet to clear liquids later this afternoon.     Diet order: NPO     Anthropometrics:  - Ht. 65"  - Wt. no new wts   - %wt change  - BMI: 25.0  - IBW: 125#      Pertinent Lab Data: Reviewed (10/4): BUN 9, Cr. 0.5     Pertinent Meds: lovenox, abx, NS, dilaudid, reglan, protonix,     Physical Findings:  - Appearance: alert and oriented; 2+ B/L leg and arm   - GI function: LBM 10/4  - Tubes: none   - Oral/Mouth cavity: denies difficulty swallowing/chewing  - Skin: surgical incision      Nutrition Requirements  Weight Used: CBW: 150#/68kg; needs continued from RD note on 9/23      Calories: 6040-5537 kcal/day (MSJ x 1.2-1.3 AF)  Protein: 68-82 gm/day (1-1.2 gm/kg CBW)  Fluid: 1 ml/kcal    Nutrient Intake: not meeting kcal/pro needs at this time      Previous Nutrition Diagnosis: Inadequate protein-energy intake (ongoing)      Nutrition Intervention: meals and snacks, medical food supplements    Recommendations:  1. Monitor Surgery/GYN POC and advance diet per their recs when medically feasible + vegetarian (fish allowed) diet modifier  2. Order Prosource Gelatein BID upon diet advancement      Goal/Expected Outcome: Pt's diet to be advanced and pt to consume >50% of meals, snacks, and supplements within 4 days      Indicator/Monitoring: RD to monitor diet order, energy intake nutrition focused physical findings

## 2019-10-04 NOTE — PROGRESS NOTE ADULT - SUBJECTIVE AND OBJECTIVE BOX
MARCELLUS WILSON  49y, Female      OVERNIGHT EVENTS:    no fevers, no abdominal pain    VITALS:  T(F): 98.8, Max: 99 (10-03-19 @ 20:25)  HR: 85  BP: 139/72  RR: 16Vital Signs Last 24 Hrs  T(C): 37.1 (04 Oct 2019 08:00), Max: 37.2 (03 Oct 2019 20:25)  T(F): 98.8 (04 Oct 2019 08:00), Max: 99 (03 Oct 2019 20:25)  HR: 85 (04 Oct 2019 08:00) (85 - 98)  BP: 139/72 (04 Oct 2019 08:00) (115/55 - 144/66)  BP(mean): 88 (03 Oct 2019 14:00) (76 - 91)  RR: 16 (04 Oct 2019 08:00) (16 - 23)  SpO2: 95% (04 Oct 2019 08:00) (95% - 98%)    TESTS & MEASUREMENTS:                        8.0    11.28 )-----------( 681      ( 04 Oct 2019 04:50 )             25.5     10-04    137  |  108  |  9<L>  ----------------------------<  95  3.8   |  20  |  0.5<L>    Ca    7.2<L>      04 Oct 2019 04:50  Phos  2.2     10-04  Mg     1.8     10-04          Culture - Body Fluid with Gram Stain (collected 10-01-19 @ 15:00)  Source: .Body Fluid None  Gram Stain (10-02-19 @ 09:54):    polymorphonuclear leukocytes seen    No organisms seen by cytocentrifuge  Final Report (10-04-19 @ 09:47):    Rare Escherichia coli    Rare Prevotella bivia "Susceptibilities not performed"  Organism: Escherichia coli (10-04-19 @ 09:47)  Organism: Escherichia coli (10-04-19 @ 09:47)      -  Amikacin: S <=16      -  Amoxicillin/Clavulanic Acid: S <=8/4      -  Ampicillin: S <=8 These ampicillin results predict results for amoxicillin      -  Ampicillin/Sulbactam: S <=4/2 Enterobacter, Citrobacter, and Serratia may develop resistance during prolonged therapy (3-4 days)      -  Aztreonam: S <=4      -  Cefazolin: S <=2 Enterobacter, Citrobacter, and Serratia may develop resistance during prolonged therapy (3-4 days)      -  Cefepime: S <=2      -  Cefoxitin: S <=8      -  Ceftriaxone: S <=1 Enterobacter, Citrobacter, and Serratia may develop resistance during prolonged therapy      -  Ciprofloxacin: S <=1      -  Ertapenem: S <=0.5      -  Gentamicin: S <=2      -  Imipenem: S <=1      -  Levofloxacin: S <=2      -  Meropenem: S <=1      -  Piperacillin/Tazobactam: S <=8      -  Tobramycin: S <=2      -  Trimethoprim/Sulfamethoxazole: S <=2/38      Method Type: SNEHA    Culture - Catheter (collected 09-29-19 @ 18:00)  Source: .Catheter PICC Tip  Final Report (10-02-19 @ 13:42):    No growth at 48 hours    Culture - Urine (collected 09-29-19 @ 09:29)  Source: .Urine Clean Catch (Midstream)  Final Report (10-01-19 @ 15:43):    >=3 organisms. Probable collection contamination.    Culture - Blood (collected 09-29-19 @ 09:07)  Source: .Blood Blood-Venous  Preliminary Report (09-30-19 @ 17:01):    No growth to date.            RADIOLOGY & ADDITIONAL TESTS:    ANTIBIOTICS:  cefepime   IVPB 2000 milliGRAM(s) IV Intermittent every 8 hours  metroNIDAZOLE  IVPB 500 milliGRAM(s) IV Intermittent every 8 hours

## 2019-10-04 NOTE — PROGRESS NOTE ADULT - ATTENDING COMMENTS
50yo female with PMHx/PSHx of total colectomy with primary anastomosis for UC s/p robotic total hysterectomy with salphino-oophorectomy, extensive NEMO 9/17 s/p laparotomy, removal of abdominal masses 10/1. At this time, patient is feeling well. Exam is bening, well healing wounds. ADONIS drain output 70cc each / 24h, serosanginous. Monitor for post-operative infection - continue antibiotics, monitor vital signs, downtrending leukocytosis to 11, monitor ADONIS outputs. consider removing ADONIS drains one at a time if <50cc/24h. Monitor bowel function, serial abdominal exams. +bowel function, NG tube removed this morning. Denies nausea/vomiting. Start CLD and advance as tolerated. Pain control. Encourage ambulation/OOB. IS, DVT ppx. Primary care as per primary team.

## 2019-10-04 NOTE — PROGRESS NOTE ADULT - SUBJECTIVE AND OBJECTIVE BOX
PGY 3 Note    Patient examined at bedside, pain well controlled on PO/IV medications.  Denies fevers/chills, HA/N/V, CP/SOB/palpitations, abdominal pain, vaginal bleeding, hematuria/dysuria, constipation/diarrhea. Tolerating clear/full/regular diet, passing/no flatus. Not Ambulating. Using incentive spirometry.     T(F): 98.8 (10-04-19 @ 08:00), Max: 99.3 (10-03-19 @ 10:38)  HR: 85 (10-04-19 @ 08:00) (85 - 98)  BP: 139/72 (10-04-19 @ 08:00) (115/55 - 144/66)  RR: 16 (10-04-19 @ 08:00) (16 - 23)  SpO2: 95% (10-04-19 @ 08:00) (95% - 100%)    I&O's Summary    03 Oct 2019 07:01  -  04 Oct 2019 07:00  --------------------------------------------------------  IN: 2602 mL / OUT: 1740 mL / NET: 862 mL      Urine:     NG Tube:   10-03-19 @ 07:01  -  10-04-19 @ 07:00  --------------------------------------------------------  IN: 0 mL / OUT: 550 mL / NET: -550 mL      Drain:   10-03-19 @ 07:01  -  10-04-19 @ 07:00  --------------------------------------------------------  IN: 0 mL / OUT: 140 mL / NET: -140 mL      CAPILLARY BLOOD GLUCOSE          Physical Exam:  General: AAOx3. NAD  CVS: RRR. Nl S1S2  Lungs: CTAB  Abdomen: soft, non-tender, non-distended, +BSx4  Incision: C/D/I, no erythema, no draining  VE: deferred, no bleeding on pad/chux  Ext: No edema. SCDs in place    Labs:             8.0<L>  11.28<H> )-----------( 681<H>    ( 10-04 @ 04:50 )             25.5<L>               7.8<L>  15.63<H> )-----------( 671<H>    ( 10-03 @ 12:00 )             24.1<L>               7.1<L>  15.54<H> )-----------( 655<H>    ( 10-03 @ 07:05 )             22.3<L>               6.2<LL>  16.95<H> )-----------( 635<H>    ( 10-03 @ 01:00 )             19.4<L>               6.3<LL>  20.49<H> )-----------( 699<H>    ( 10-03 @ 00:25 )             20.1<L>     10-04    137  |  108  |  9<L>  ----------------------------<  95  3.8   |  20  |  0.5<L>    Ca    7.2<L>      04 Oct 2019 04:50  Phos  2.2     10-04  Mg     1.8     10-04        Culture - Body Fluid with Gram Stain (collected 01 Oct 2019 15:00)  Source: .Body Fluid None  Gram Stain (02 Oct 2019 09:54):    polymorphonuclear leukocytes seen    No organisms seen by cytocentrifuge  Final Report (04 Oct 2019 09:47):    Rare Escherichia coli    Rare Prevotella bivia "Susceptibilities not performed"  Organism: Escherichia coli (04 Oct 2019 09:47)  Organism: Escherichia coli (04 Oct 2019 09:47)            Trend:             8.0<L>  11.28<H> )-----------( 681<H>    ( 10-04 @ 04:50 )             25.5<L>               7.8<L>  15.63<H> )-----------( 671<H>    ( 10-03 @ 12:00 )             24.1<L>               7.1<L>  15.54<H> )-----------( 655<H>    ( 10-03 @ 07:05 )             22.3<L>               6.2<LL>  16.95<H> )-----------( 635<H>    ( 10-03 @ 01:00 )             19.4<L>               6.3<LL>  20.49<H> )-----------( 699<H>    ( 10-03 @ 00:25 )             20.1<L>        Creatinine, Serum: 0.5 (10-04)  Creatinine, Serum: 0.5 (10-03)  Creatinine, Serum: <0.5 (10-02)  Creatinine, Serum: 0.6 (10-02)  Creatinine, Serum: 0.6 (10-01)  Creatinine, Serum: 0.6 (09-29)  Creatinine, Serum: 0.6 (09-28)  Creatinine, Serum: 0.7 (09-27)  Creatinine, Serum: 0.7 (09-26)  Creatinine, Serum: 0.7 (09-25)  Creatinine, Serum: 0.8 (09-24)  Creatinine, Serum: 0.9 (09-23)  Creatinine, Serum: 1.1 (09-23)  Creatinine, Serum: 0.8 (09-21)  Creatinine, Serum: 0.7 (09-20)  Creatinine, Serum: 0.7 (09-19)  Creatinine, Serum: 0.9 (09-17)      Medications:  MEDICATIONS  (STANDING):  cefepime   IVPB 2000 milliGRAM(s) IV Intermittent every 8 hours  dextrose 10%. 1000 milliLiter(s) (250 mL/Hr) IV Continuous <Continuous>  enoxaparin Injectable 40 milliGRAM(s) SubCutaneous daily  metroNIDAZOLE  IVPB 500 milliGRAM(s) IV Intermittent every 8 hours  pantoprazole  Injectable 40 milliGRAM(s) IV Push daily  sodium chloride 0.9%. 1000 milliLiter(s) (125 mL/Hr) IV Continuous <Continuous>  vancomycin  IVPB 1500 milliGRAM(s) IV Intermittent every 12 hours    MEDICATIONS  (PRN):  HYDROmorphone  Injectable 0.5 milliGRAM(s) IV Push every 4 hours PRN Moderate Pain (4 - 6)  HYDROmorphone  Injectable 1 milliGRAM(s) IV Push every 4 hours PRN Severe Pain (7 - 10)  metoclopramide Injectable 10 milliGRAM(s) IV Push every 6 hours PRN Nausea and/or Vomiting  naloxone Injectable 0.1 milliGRAM(s) IV Push every 3 minutes PRN For ANY of the following changes in patient status:  A. RR LESS THAN 10 breaths per minute, B. Oxygen saturation LESS THAN 90%, C. Sedation score of 6

## 2019-10-04 NOTE — PROGRESS NOTE ADULT - EXTREMITIES
No cyanosis, clubbing or edema
detailed exam
No cyanosis, clubbing or edema
No cyanosis, clubbing or edema

## 2019-10-04 NOTE — PROGRESS NOTE ADULT - ASSESSMENT
Patient is a 49y old Female s/p exploratory laparotomy with abdominal washout, lysis of adhesions, removal of retained fibroid x2. Hb dropped yesterday to 6.2 s/p 1U hb =8.0     Plan:   - pain control   - ambulate   - Monitor NGT output

## 2019-10-05 LAB
ANION GAP SERPL CALC-SCNC: 9 MMOL/L — SIGNIFICANT CHANGE UP (ref 7–14)
BASOPHILS # BLD AUTO: 0.03 K/UL — SIGNIFICANT CHANGE UP (ref 0–0.2)
BASOPHILS NFR BLD AUTO: 0.3 % — SIGNIFICANT CHANGE UP (ref 0–1)
BUN SERPL-MCNC: 4 MG/DL — LOW (ref 10–20)
CALCIUM SERPL-MCNC: 7.5 MG/DL — LOW (ref 8.5–10.1)
CHLORIDE SERPL-SCNC: 104 MMOL/L — SIGNIFICANT CHANGE UP (ref 98–110)
CO2 SERPL-SCNC: 23 MMOL/L — SIGNIFICANT CHANGE UP (ref 17–32)
CREAT SERPL-MCNC: <0.5 MG/DL — LOW (ref 0.7–1.5)
EOSINOPHIL # BLD AUTO: 0.1 K/UL — SIGNIFICANT CHANGE UP (ref 0–0.7)
EOSINOPHIL NFR BLD AUTO: 0.9 % — SIGNIFICANT CHANGE UP (ref 0–8)
GLUCOSE SERPL-MCNC: 117 MG/DL — HIGH (ref 70–99)
HCT VFR BLD CALC: 26.5 % — LOW (ref 37–47)
HGB BLD-MCNC: 8.2 G/DL — LOW (ref 12–16)
IMM GRANULOCYTES NFR BLD AUTO: 6.4 % — HIGH (ref 0.1–0.3)
LYMPHOCYTES # BLD AUTO: 1.08 K/UL — LOW (ref 1.2–3.4)
LYMPHOCYTES # BLD AUTO: 10.2 % — LOW (ref 20.5–51.1)
MAGNESIUM SERPL-MCNC: 1.8 MG/DL — SIGNIFICANT CHANGE UP (ref 1.8–2.4)
MCHC RBC-ENTMCNC: 25.7 PG — LOW (ref 27–31)
MCHC RBC-ENTMCNC: 30.9 G/DL — LOW (ref 32–37)
MCV RBC AUTO: 83.1 FL — SIGNIFICANT CHANGE UP (ref 81–99)
MONOCYTES # BLD AUTO: 1.15 K/UL — HIGH (ref 0.1–0.6)
MONOCYTES NFR BLD AUTO: 10.8 % — HIGH (ref 1.7–9.3)
NEUTROPHILS # BLD AUTO: 7.56 K/UL — HIGH (ref 1.4–6.5)
NEUTROPHILS NFR BLD AUTO: 71.4 % — SIGNIFICANT CHANGE UP (ref 42.2–75.2)
NRBC # BLD: 0 /100 WBCS — SIGNIFICANT CHANGE UP (ref 0–0)
PHOSPHATE SERPL-MCNC: 2.6 MG/DL — SIGNIFICANT CHANGE UP (ref 2.1–4.9)
PLATELET # BLD AUTO: 670 K/UL — HIGH (ref 130–400)
POTASSIUM SERPL-MCNC: 3.9 MMOL/L — SIGNIFICANT CHANGE UP (ref 3.5–5)
POTASSIUM SERPL-SCNC: 3.9 MMOL/L — SIGNIFICANT CHANGE UP (ref 3.5–5)
RBC # BLD: 3.19 M/UL — LOW (ref 4.2–5.4)
RBC # FLD: 18.5 % — HIGH (ref 11.5–14.5)
SODIUM SERPL-SCNC: 136 MMOL/L — SIGNIFICANT CHANGE UP (ref 135–146)
WBC # BLD: 10.6 K/UL — SIGNIFICANT CHANGE UP (ref 4.8–10.8)
WBC # FLD AUTO: 10.6 K/UL — SIGNIFICANT CHANGE UP (ref 4.8–10.8)

## 2019-10-05 PROCEDURE — 99233 SBSQ HOSP IP/OBS HIGH 50: CPT | Mod: 25

## 2019-10-05 RX ORDER — POTASSIUM PHOSPHATE, MONOBASIC POTASSIUM PHOSPHATE, DIBASIC 236; 224 MG/ML; MG/ML
30 INJECTION, SOLUTION INTRAVENOUS ONCE
Refills: 0 | Status: DISCONTINUED | OUTPATIENT
Start: 2019-10-05 | End: 2019-10-05

## 2019-10-05 RX ORDER — MAGNESIUM SULFATE 500 MG/ML
1 VIAL (ML) INJECTION ONCE
Refills: 0 | Status: DISCONTINUED | OUTPATIENT
Start: 2019-10-05 | End: 2019-10-05

## 2019-10-05 RX ADMIN — Medication 200 MILLIGRAM(S): at 17:24

## 2019-10-05 RX ADMIN — Medication 10 MILLIGRAM(S): at 14:38

## 2019-10-05 RX ADMIN — Medication 100 MILLIGRAM(S): at 05:09

## 2019-10-05 RX ADMIN — Medication 100 MILLIGRAM(S): at 21:03

## 2019-10-05 RX ADMIN — PANTOPRAZOLE SODIUM 40 MILLIGRAM(S): 20 TABLET, DELAYED RELEASE ORAL at 11:24

## 2019-10-05 RX ADMIN — Medication 200 MILLIGRAM(S): at 05:08

## 2019-10-05 RX ADMIN — Medication 10 MILLIGRAM(S): at 00:44

## 2019-10-05 RX ADMIN — ENOXAPARIN SODIUM 40 MILLIGRAM(S): 100 INJECTION SUBCUTANEOUS at 11:25

## 2019-10-05 RX ADMIN — Medication 100 MILLIGRAM(S): at 14:00

## 2019-10-05 NOTE — PROGRESS NOTE ADULT - ASSESSMENT
Patient is a 49y old Female s/p exploratory laparotomy with abdominal washout, lysis of adhesions, removal of retained fibroid x2. Hb dropped yesterday to 6.2 s/p 1U hb =8.0     Plan:   - pain control   - ambulate   - Monitor NGT output   - F/U WBC

## 2019-10-05 NOTE — PROGRESS NOTE ADULT - ASSESSMENT
48 y/o P1 h/o ulcerative colitis with total colectomy, h/o DCIS s/p lumpectomy with radiation, h/o unprovoked PE found to have prothrombin mutation (heterozygous), with fibroid uterus and pelvic pain s/p RATLH, BSO, cystoscopy, EBL 250cc, complicated by postoperative ileus vs SBO and infected pelvic mass s/p reop exlap with removal of two abdominal masses and abdominal wash out,   POD#4/18, on cipro/flagyl, afebrile, WBC decreasing, doing well.     GI: NG tube d/raudel yesterday, tolerating clear liquid diet, no vomiting. Will possibly advance diet in the evening if no further episodes of vomiting  DVT prophylaxis: lovenox, SCDs while in bed, nurse to walk patient during the day  Neuro: pain management PRN  Pulm: bilateral basilar opacities stable on CXR, encourage incentive spirometer and ambulation  ID: afebrile, WBC trending down 30.94->20.49->16.95 -> 15.63 -> 11.28 -> 10.6 Abx changed yesterday to cipro and flagyl IV per ID. ID following. Peritoneal fluid cx e.coli and Prevotella pansensitive, cath tip neg, UCx contaminated, BCx negative  Endo: no dx  Heme: s/p 2u PRBCs , H/H trending up 7.1-> 7.8-> 8.0 -> 8.2, ADONIS output serosanguious, will pull when <25cc over 24h  : Clear urine, no vaginal bleeding    Dr. Wayne aware.

## 2019-10-05 NOTE — PROGRESS NOTE ADULT - SUBJECTIVE AND OBJECTIVE BOX
GENERAL SURGERY PROGRESS NOTE     MARCELLUS WILSON  49y  Female  Hospital day :18d  POD:  Procedure: Surgical removal of intra-abdominal or retroperitoneal mass greater than 10 cm in diameter  Abdominal washout  Surgical removal of intra-abdominal or retroperitoneal mass greater than 10 cm in diameter  Lysis, adhesions, intestine  Exploratory laparotomy  Lysis, adhesions, pelvis, laparoscopic  Cystoscopy, diagnostic  Laparoscopic total hysterectomy with salpingo-oophorectomy for uterus greater than 250 grams    OVERNIGHT EVENTS: No acute events    T(F): 96.1 (10-05-19 @ 00:00), Max: 98.8 (10-04-19 @ 08:00)  HR: 94 (10-05-19 @ 00:00) (85 - 97)  BP: 129/68 (10-05-19 @ 00:00) (122/76 - 139/72)  RR: 18 (10-05-19 @ 00:00) (16 - 18)  SpO2: 95% (10-04-19 @ 08:00) (95% - 95%)    DIET/FLUIDS: dextrose 10%. 1000 milliLiter(s) IV Continuous <Continuous>    NG:   10-03-19 @ 07:01  -  10-04-19 @ 07:00  --------------------------------------------------------  OUT: 550 mL                                                                                 DRAINS:   10-03-19 @ 07:01  -  10-04-19 @ 07:00  --------------------------------------------------------  OUT: 140 mL       GI proph:  pantoprazole  Injectable 40 milliGRAM(s) IV Push daily    AC/ proph:   ABx: ciprofloxacin   IVPB 400 milliGRAM(s) IV Intermittent every 12 hours  metroNIDAZOLE  IVPB 500 milliGRAM(s) IV Intermittent every 8 hours      PHYSICAL EXAM:  GENERAL: NAD, well-appearing  CHEST/LUNG: Clear to auscultation bilaterally  HEART: Regular rate and rhythm  ABDOMEN: Soft, Nontender, Nondistended;   EXTREMITIES:  No clubbing, cyanosis, or edema      LABS  Labs:  CAPILLARY BLOOD GLUCOSE                              8.0    11.28 )-----------( 681      ( 04 Oct 2019 04:50 )             25.5         10-04    137  |  108  |  9<L>  ----------------------------<  95  3.8   |  20  |  0.5<L>          LFTs:       ABG - ( 01 Oct 2019 16:17 )  pH: 7.45  /  pCO2: 33    /  pO2: 91    / HCO3: 23    / Base Excess: -1.0  /  SaO2: 98                                RADIOLOGY & ADDITIONAL TESTS:      No new studies

## 2019-10-05 NOTE — PROGRESS NOTE ADULT - ATTENDING COMMENTS
Stable and afebrile.  passing flatus and had 3 BMs.  No n/v.  Tolerates clears.  Abdomen soft, NT, mildly distended.  wound intact. ADONIS SS fluid.    a/p:  Progressing well, resolving postop ileus.  Fulls today and ensure.  Advance diet as tolerated.  OOB.  drains as per GYN team.

## 2019-10-05 NOTE — PROGRESS NOTE ADULT - SUBJECTIVE AND OBJECTIVE BOX
PGY3 progress note    Patient seen and examined at bedside, resting comfortably. Pain well controlled. No episodes of vomiting since NG output removed. Reports passing gas and bowel movements. Tolerating clear liquid diet. Denies fever, n/v, chest pain, SOB, abdominal pain, dysuria, diarrhea.     PE:  Vital Signs Last 24 Hrs  T(C): 37 (05 Oct 2019 07:43), Max: 37.1 (04 Oct 2019 15:37)  T(F): 98.6 (05 Oct 2019 07:43), Max: 98.7 (04 Oct 2019 15:37)  HR: 83 (05 Oct 2019 07:43) (83 - 97)  BP: 146/71 (05 Oct 2019 07:43) (122/76 - 146/71)  BP(mean): --  RR: 18 (05 Oct 2019 07:43) (18 - 18)  SpO2: --    GEN: NAD, AAOX3  CVS: RRR, normal S1/S2  lungs: CTAB  abd: soft, nontender, no r/g/r, midline incision c/d/i with staples in place, PGY3 progress note    Patient seen and examined at bedside, resting comfortably. Pain well controlled. No episodes of vomiting since NG output removed. Reports passing gas and bowel movements. Tolerating clear liquid diet. Denies fever, n/v, chest pain, SOB, abdominal pain, dysuria, diarrhea.     PE:  Vital Signs Last 24 Hrs  T(C): 37 (05 Oct 2019 07:43), Max: 37.1 (04 Oct 2019 15:37)  T(F): 98.6 (05 Oct 2019 07:43), Max: 98.7 (04 Oct 2019 15:37)  HR: 83 (05 Oct 2019 07:43) (83 - 97)  BP: 146/71 (05 Oct 2019 07:43) (122/76 - 146/71)  BP(mean): --  RR: 18 (05 Oct 2019 07:43) (18 - 18)  SpO2: --    ADONIS output:  10/4 Right (5495-0830) 30 cc serosanguinous  10/4 Left (5231-0098) 32 cc serosanguionous    GEN: NAD, AAOX3  CVS: RRR, normal S1/S2  lungs: CTAB  abd: soft, nontender, nondistended, no r/g/r, midline incision c/d/i with staples in place, JPs in right and left lower quadrants  ext: no calf tenderness or edema    labs:                        8.2    10.60 )-----------( 670      ( 05 Oct 2019 07:30 )             26.5     10-05    136  |  104  |  4<L>  ----------------------------<  117<H>  3.9   |  23  |  <0.5<L>    Ca    7.5<L>      05 Oct 2019 07:30  Phos  2.6     10-05  Mg     1.8     10-05 9/29:   Bcx (final) NGTD   Catheter tip culture (final)-NG  Ucx-<3 organisms, probable contamination   10/1   Peritoneal fluid cx (final) rare Prevotella bivia and E.coli pansensitive (including cipro)    MEDICATIONS  (STANDING):  ciprofloxacin   IVPB 400 milliGRAM(s) IV Intermittent every 12 hours  enoxaparin Injectable 40 milliGRAM(s) SubCutaneous daily  metroNIDAZOLE  IVPB 500 milliGRAM(s) IV Intermittent every 8 hours  pantoprazole  Injectable 40 milliGRAM(s) IV Push daily    MEDICATIONS  (PRN):  HYDROmorphone  Injectable 0.5 milliGRAM(s) IV Push every 4 hours PRN Moderate Pain (4 - 6)  HYDROmorphone  Injectable 1 milliGRAM(s) IV Push every 4 hours PRN Severe Pain (7 - 10)  metoclopramide Injectable 10 milliGRAM(s) IV Push every 6 hours PRN Nausea and/or Vomiting  naloxone Injectable 0.1 milliGRAM(s) IV Push every 3 minutes PRN For ANY of the following changes in patient status:  A. RR LESS THAN 10 breaths per minute, B. Oxygen saturation LESS THAN 90%, C. Sedation score of 6

## 2019-10-06 ENCOUNTER — TRANSCRIPTION ENCOUNTER (OUTPATIENT)
Age: 49
End: 2019-10-06

## 2019-10-06 VITALS
TEMPERATURE: 98 F | HEART RATE: 80 BPM | SYSTOLIC BLOOD PRESSURE: 133 MMHG | DIASTOLIC BLOOD PRESSURE: 71 MMHG | RESPIRATION RATE: 18 BRPM

## 2019-10-06 LAB
ANION GAP SERPL CALC-SCNC: 8 MMOL/L — SIGNIFICANT CHANGE UP (ref 7–14)
BASOPHILS # BLD AUTO: 0.03 K/UL — SIGNIFICANT CHANGE UP (ref 0–0.2)
BASOPHILS NFR BLD AUTO: 0.3 % — SIGNIFICANT CHANGE UP (ref 0–1)
BUN SERPL-MCNC: 3 MG/DL — LOW (ref 10–20)
CALCIUM SERPL-MCNC: 7.5 MG/DL — LOW (ref 8.5–10.1)
CHLORIDE SERPL-SCNC: 105 MMOL/L — SIGNIFICANT CHANGE UP (ref 98–110)
CO2 SERPL-SCNC: 26 MMOL/L — SIGNIFICANT CHANGE UP (ref 17–32)
CREAT SERPL-MCNC: <0.5 MG/DL — LOW (ref 0.7–1.5)
EOSINOPHIL # BLD AUTO: 0.09 K/UL — SIGNIFICANT CHANGE UP (ref 0–0.7)
EOSINOPHIL NFR BLD AUTO: 1 % — SIGNIFICANT CHANGE UP (ref 0–8)
GLUCOSE SERPL-MCNC: 105 MG/DL — HIGH (ref 70–99)
HCT VFR BLD CALC: 26.6 % — LOW (ref 37–47)
HGB BLD-MCNC: 8.4 G/DL — LOW (ref 12–16)
IMM GRANULOCYTES NFR BLD AUTO: 8.8 % — HIGH (ref 0.1–0.3)
LYMPHOCYTES # BLD AUTO: 1.04 K/UL — LOW (ref 1.2–3.4)
LYMPHOCYTES # BLD AUTO: 11.8 % — LOW (ref 20.5–51.1)
MAGNESIUM SERPL-MCNC: 1.7 MG/DL — LOW (ref 1.8–2.4)
MCHC RBC-ENTMCNC: 26.4 PG — LOW (ref 27–31)
MCHC RBC-ENTMCNC: 31.6 G/DL — LOW (ref 32–37)
MCV RBC AUTO: 83.6 FL — SIGNIFICANT CHANGE UP (ref 81–99)
MONOCYTES # BLD AUTO: 0.94 K/UL — HIGH (ref 0.1–0.6)
MONOCYTES NFR BLD AUTO: 10.6 % — HIGH (ref 1.7–9.3)
NEUTROPHILS # BLD AUTO: 5.96 K/UL — SIGNIFICANT CHANGE UP (ref 1.4–6.5)
NEUTROPHILS NFR BLD AUTO: 67.5 % — SIGNIFICANT CHANGE UP (ref 42.2–75.2)
NRBC # BLD: 0 /100 WBCS — SIGNIFICANT CHANGE UP (ref 0–0)
PHOSPHATE SERPL-MCNC: 3 MG/DL — SIGNIFICANT CHANGE UP (ref 2.1–4.9)
PLATELET # BLD AUTO: 616 K/UL — HIGH (ref 130–400)
POTASSIUM SERPL-MCNC: 3.9 MMOL/L — SIGNIFICANT CHANGE UP (ref 3.5–5)
POTASSIUM SERPL-SCNC: 3.9 MMOL/L — SIGNIFICANT CHANGE UP (ref 3.5–5)
RBC # BLD: 3.18 M/UL — LOW (ref 4.2–5.4)
RBC # FLD: 18.5 % — HIGH (ref 11.5–14.5)
SODIUM SERPL-SCNC: 139 MMOL/L — SIGNIFICANT CHANGE UP (ref 135–146)
WBC # BLD: 8.84 K/UL — SIGNIFICANT CHANGE UP (ref 4.8–10.8)
WBC # FLD AUTO: 8.84 K/UL — SIGNIFICANT CHANGE UP (ref 4.8–10.8)

## 2019-10-06 PROCEDURE — 99024 POSTOP FOLLOW-UP VISIT: CPT

## 2019-10-06 RX ORDER — IBUPROFEN 200 MG
1 TABLET ORAL
Qty: 28 | Refills: 0
Start: 2019-10-06 | End: 2019-10-12

## 2019-10-06 RX ORDER — ACETAMINOPHEN 500 MG
2 TABLET ORAL
Qty: 56 | Refills: 0
Start: 2019-10-06 | End: 2019-10-12

## 2019-10-06 RX ORDER — SIMETHICONE 80 MG/1
1 TABLET, CHEWABLE ORAL
Qty: 45 | Refills: 0
Start: 2019-10-06 | End: 2019-10-20

## 2019-10-06 RX ORDER — CIPROFLOXACIN LACTATE 400MG/40ML
1 VIAL (ML) INTRAVENOUS
Qty: 18 | Refills: 0
Start: 2019-10-06 | End: 2019-10-14

## 2019-10-06 RX ORDER — MAGNESIUM SULFATE 500 MG/ML
2 VIAL (ML) INJECTION ONCE
Refills: 0 | Status: COMPLETED | OUTPATIENT
Start: 2019-10-06 | End: 2019-10-06

## 2019-10-06 RX ORDER — METRONIDAZOLE 500 MG
1 TABLET ORAL
Qty: 27 | Refills: 0
Start: 2019-10-06 | End: 2019-10-14

## 2019-10-06 RX ORDER — DOCUSATE SODIUM 100 MG
1 CAPSULE ORAL
Qty: 30 | Refills: 0
Start: 2019-10-06 | End: 2019-10-20

## 2019-10-06 RX ADMIN — Medication 100 MILLIGRAM(S): at 05:07

## 2019-10-06 RX ADMIN — Medication 200 MILLIGRAM(S): at 05:07

## 2019-10-06 RX ADMIN — ENOXAPARIN SODIUM 40 MILLIGRAM(S): 100 INJECTION SUBCUTANEOUS at 11:20

## 2019-10-06 RX ADMIN — PANTOPRAZOLE SODIUM 40 MILLIGRAM(S): 20 TABLET, DELAYED RELEASE ORAL at 11:20

## 2019-10-06 RX ADMIN — Medication 50 GRAM(S): at 09:47

## 2019-10-06 NOTE — PROGRESS NOTE ADULT - PROVIDER SPECIALTY LIST ADULT
GYN
Gyn Onc
Infectious Disease
Intervent Radiology
Intervent Radiology
OB
OB
SICU
SICU
Surgery
GYN
Surgery

## 2019-10-06 NOTE — DISCHARGE NOTE PROVIDER - NSDCFUADDINST_GEN_ALL_CORE_FT
Nothing in the vagina for 6 weeks (no sex, no tampons, no douching, no swimming pools). Avoid tub baths, you may shower. Please keep incision clean and dry.     If you have a fever of 100.4F or greater, severe vaginal bleeding, or severe abdominal pain, call your Ob/Gyn or come to the emergency department immediately.

## 2019-10-06 NOTE — DISCHARGE NOTE PROVIDER - HOSPITAL COURSE
50yo P1 h/o UC s/p total colectomy, h/o DCIS s/p lumpectomy and radiation, h/o unprovoked PE and prothombin gene mutation, hx of fibroids RATLH BSO cystoscopy, vaginal laceration repair, EBL 250cc on 9/17. Postoperative period complicated by SBO S/P NG tube and fever with septic symptoms. Per ID, kept on IV antibiotics, CT abd/pelvis ordered for fever workup showed pelvic collection. S/P failed IR drainage. In view of this, pt taken for ex-lap, removal of necrotic fibroids (confirmed on frozen section), NEMO, and abdominal washout on 10/1. Postop, upgraded to SICU for sepsis and intraoperative hoypotension. In SICU, had episode of hyperkalemia treated with insulin and dextrose. Received pRBC for drop in H/H. Central line was placed for access due to B/L arm precautions. S/P NGT, graves, A line, ADONIS drain x2. Now, she is tolerating regular diet, denied vomiting, ambulating, voiding, having regular diet. Serial labs drawn showed downward trending white count. Pt afebrile > 48 hours. Will d/c central line and send patient home with PO antibiotics with plan to f/u as outpatient.

## 2019-10-06 NOTE — DISCHARGE NOTE NURSING/CASE MANAGEMENT/SOCIAL WORK - PATIENT PORTAL LINK FT
You can access the FollowMyHealth Patient Portal offered by [facility] by registering at the following website: [email or web address]. By joining [employer]’s FollowMyHealth portal, you will also be able to view your health information using other applications (apps) compatible with our system.

## 2019-10-06 NOTE — PROGRESS NOTE ADULT - ASSESSMENT
48 y/o P1 h/o ulcerative colitis with total colectomy, h/o DCIS s/p lumpectomy with radiation, h/o unprovoked PE found to have prothrombin mutation (heterozygous), with fibroid uterus and pelvic pain s/p RATLH, BSO, cystoscopy, EBL 250cc, complicated by postoperative ileus vs SBO and infected pelvic mass s/p reop exlap with removal of two abdominal masses and abdominal wash out,   POD#5/19, on cipro/flagyl, afebrile, WBC decreasing, doing well.     GI: S/P NG tube, tolerating full liquid diet, no vomiting. Per surgery, can advance to regular diet for lunch; Mg 1.7; will replete   DVT prophylaxis: lovenox, SCDs while in bed, nurse to walk patient during the day  Neuro: pain management PRN  Pulm: bilateral basilar opacities stable on CXR, encourage incentive spirometer and ambulation  ID: afebrile, WBC trending down 30.94->20.49->16.95 -> 15.63 -> 11.28 -> 10.6->8.84 Abx changed to cipro and flagyl IV per ID. ID following. Will d/c home with PO cipro/flagyl. Peritoneal fluid cx e.coli and Prevotella pansensitive, cath tip neg, UCx contaminated, BCx negative  Endo: no dx  Heme: s/p 2u PRBCs , H/H trending up 7.1-> 7.8-> 8.0 -> 8.2->8.4, S/P ADONIS drain x2; right central line in situ   : Clear urine, no vaginal bleeding    - possible d/c home today if tolerates regular diet   - will d/c central line prior to d/c     Will inform Dr. Wayne

## 2019-10-06 NOTE — PROGRESS NOTE ADULT - ATTENDING COMMENTS
Patient seen and examined with surgery team on rounds and discussed management plans. + bm tolerating liquids wound benign discharge plan discussed

## 2019-10-06 NOTE — DISCHARGE NOTE PROVIDER - CARE PROVIDER_API CALL
Carola Pena)  OBSGYN  Physicians  [address] [place] [address]  Phone: [phone]  Fax: [phone]  Follow Up Time: 1 week

## 2019-10-06 NOTE — PROGRESS NOTE ADULT - SUBJECTIVE AND OBJECTIVE BOX
PGY3 Progress Note:     Pt seen and evaluated at bedside. Pain well controlled; denies nausea/vomiting, diarrhea/constipation. Denies fever, chills, vaginal bleeding, dysuria, urgency, frequency.   Tolerating full liquid diet. Pt is ambulating, voiding, passing gas/having bowel movement without difficulty.   S/P ADONIS drain removal on 10/5     Vital Signs Last 24 Hrs  T(C): 36.6 (06 Oct 2019 08:00), Max: 36.8 (06 Oct 2019 00:00)  T(F): 97.8 (06 Oct 2019 08:00), Max: 98.2 (06 Oct 2019 00:00)  HR: 80 (06 Oct 2019 08:00) (80 - 92)  BP: 133/71 (06 Oct 2019 08:00) (133/71 - 168/77)  RR: 18 (06 Oct 2019 08:00) (18 - 18)         Gen: NAD, AAO x 3  HEENT: right central line in place; no surrounding erythema/induration  Heart: RRR, S1S2+  Lungs: CTA B/L, no r/r/w   Abd: soft, nontender, no r/g/r, BS+  Incision: vertical with staples; c/d/i; steris at ADONIS site, c/d/i  Ext: 1+ B/L pitting edema; no erythema/tenderness     Labs:                       8.4    8.84  )-----------( 616      ( 06 Oct 2019 07:40 )             26.6   10-06    139  |  105  |  3<L>  ----------------------------<  105<H>  3.9   |  26  |  <0.5<L>    Ca    7.5<L>      06 Oct 2019 07:40  Phos  3.0     10-06  Mg     1.7     10-06 9/29:   Bcx (final) neg   Catheter tip culture (final)- neg  Ucx-<3 organisms, probable contamination   10/1   Peritoneal fluid cx (final) rare Prevotella bivia and E.coli pansensitive (including cipro)    MEDICATIONS  (STANDING):  ciprofloxacin   IVPB 400 milliGRAM(s) IV Intermittent every 12 hours  enoxaparin Injectable 40 milliGRAM(s) SubCutaneous daily  magnesium sulfate  IVPB 2 Gram(s) IV Intermittent once  metroNIDAZOLE  IVPB 500 milliGRAM(s) IV Intermittent every 8 hours  pantoprazole  Injectable 40 milliGRAM(s) IV Push daily    MEDICATIONS  (PRN):  HYDROmorphone  Injectable 0.5 milliGRAM(s) IV Push every 4 hours PRN Moderate Pain (4 - 6)  HYDROmorphone  Injectable 1 milliGRAM(s) IV Push every 4 hours PRN Severe Pain (7 - 10)  metoclopramide Injectable 10 milliGRAM(s) IV Push every 6 hours PRN Nausea and/or Vomiting  naloxone Injectable 0.1 milliGRAM(s) IV Push every 3 minutes PRN For ANY of the following changes in patient status:  A. RR LESS THAN 10 breaths per minute, B. Oxygen saturation LESS THAN 90%, C. Sedation score of 6

## 2019-10-06 NOTE — PROGRESS NOTE ADULT - SUBJECTIVE AND OBJECTIVE BOX
GENERAL SURGERY PROGRESS NOTE     MARCELLUS WILSON  49y  Female  Hospital day :19d  POD:  Procedure: Surgical removal of intra-abdominal or retroperitoneal mass greater than 10 cm in diameter  Abdominal washout  Surgical removal of intra-abdominal or retroperitoneal mass greater than 10 cm in diameter  Lysis, adhesions, intestine  Exploratory laparotomy  Lysis, adhesions, pelvis, laparoscopic  Cystoscopy, diagnostic  Laparoscopic total hysterectomy with salpingo-oophorectomy for uterus greater than 250 grams    OVERNIGHT EVENTS:  Tolerating FLD with Ensure. +bowel function. ADONIS drains removed.     T(F): 98.2 (10-06-19 @ 00:00), Max: 98.6 (10-05-19 @ 07:43)  HR: 92 (10-06-19 @ 00:00) (83 - 92)  BP: 156/77 (10-06-19 @ 00:00) (146/71 - 168/77)  ABP: --  ABP(mean): --  RR: 18 (10-06-19 @ 00:00) (18 - 18)  SpO2: --                                                                               DRAINS:   10-04-19 @ 07:01  -  10-05-19 @ 07:00  --------------------------------------------------------  OUT: 62 mL     GI proph:  pantoprazole  Injectable 40 milliGRAM(s) IV Push daily    ABx: ciprofloxacin   IVPB 400 milliGRAM(s) IV Intermittent every 12 hours  metroNIDAZOLE  IVPB 500 milliGRAM(s) IV Intermittent every 8 hours    PHYSICAL EXAM:  GENERAL: NAD, well-appearing  CHEST/LUNG: Clear to auscultation bilaterally  HEART: Regular rate and rhythm  ABDOMEN: Soft, Nontender, Nondistended;   EXTREMITIES:  No clubbing, cyanosis, or edema    LABS  Labs:  CAPILLARY BLOOD GLUCOSE                    8.2    10.60 )-----------( 670      ( 05 Oct 2019 07:30 )             26.5       Auto Neutrophil %: 71.4 % (10-05-19 @ 07:30)  Auto Immature Granulocyte %: 6.4 % (10-05-19 @ 07:30)    10-05    136  |  104  |  4<L>  ----------------------------<  117<H>  3.9   |  23  |  <0.5<L>    Calcium, Total Serum: 7.5 mg/dL (10-05-19 @ 07:30)    ABG - ( 01 Oct 2019 16:17 )  pH: 7.45  /  pCO2: 33    /  pO2: 91    / HCO3: 23    / Base Excess: -1.0  /  SaO2: 98 GENERAL SURGERY PROGRESS NOTE     MARCELLUS WILSON  49y  Female  Hospital day :19d  POD:  Procedure: Surgical removal of intra-abdominal or retroperitoneal mass greater than 10 cm in diameter  Abdominal washout  Surgical removal of intra-abdominal or retroperitoneal mass greater than 10 cm in diameter  Lysis, adhesions, intestine  Exploratory laparotomy  Lysis, adhesions, pelvis, laparoscopic  Cystoscopy, diagnostic  Laparoscopic total hysterectomy with salpingo-oophorectomy for uterus greater than 250 grams    OVERNIGHT EVENTS:  Tolerating FLD with Ensure. +bowel function. ADONIS drains removed.     T(F): 98.2 (10-06-19 @ 00:00), Max: 98.6 (10-05-19 @ 07:43)  HR: 92 (10-06-19 @ 00:00) (83 - 92)  BP: 156/77 (10-06-19 @ 00:00) (146/71 - 168/77)  RR: 18 (10-06-19 @ 00:00) (18 - 18)  SpO2: --                                                                               DRAINS:   10-04-19 @ 07:01  -  10-05-19 @ 07:00  --------------------------------------------------------  OUT: 62 mL     GI proph:  pantoprazole  Injectable 40 milliGRAM(s) IV Push daily    ABx: ciprofloxacin   IVPB 400 milliGRAM(s) IV Intermittent every 12 hours  metroNIDAZOLE  IVPB 500 milliGRAM(s) IV Intermittent every 8 hours    PHYSICAL EXAM:  GENERAL: NAD, well-appearing  CHEST/LUNG: Clear to auscultation bilaterally  HEART: Regular rate and rhythm  ABDOMEN: Soft, Nontender, Nondistended;   EXTREMITIES:  No clubbing, cyanosis, or edema    LABS  Labs:  CAPILLARY BLOOD GLUCOSE                    8.2    10.60 )-----------( 670      ( 05 Oct 2019 07:30 )             26.5       Auto Neutrophil %: 71.4 % (10-05-19 @ 07:30)  Auto Immature Granulocyte %: 6.4 % (10-05-19 @ 07:30)    10-05    136  |  104  |  4<L>  ----------------------------<  117<H>  3.9   |  23  |  <0.5<L>    Calcium, Total Serum: 7.5 mg/dL (10-05-19 @ 07:30)    ABG - ( 01 Oct 2019 16:17 )  pH: 7.45  /  pCO2: 33    /  pO2: 91    / HCO3: 23    / Base Excess: -1.0  /  SaO2: 98

## 2019-10-06 NOTE — DISCHARGE NOTE PROVIDER - PROVIDER TOKENS
DAMON visit     HPI:  Pt still dissatisfied about not being able to .  Discussed risk with unknown uterine incision.  Reports +FM and denies any VB or LOF.  All labs from previous visit wnl.  No contractions.      PE - see vitals  Abd - midline vertical incision ?Uterine Incision     A/P: 29 yo  @ 32w1d by 11w cathy presents today for DAMON visit  1. Continue routine prenatal care  - Encourage PNV  - PTL precautions  - O neg, RI,  RPR NR, HIV neg, HBsAg neg, GC/CT neg/neg; 1hr refused, BS logs wnl    - +CT on NOB labs, STEPHANIE negative.   - Quad Screen Neg  - RTC in 2 weeks DAMON visit     2. H/o previous c/section   - States she was told she could  despite her incision on her abdomen (no op report in Epic, will check HAC and will have scanned in epic if found)  - Previously counseled by Dr. Bonner, consents signed  - Consent scanned in chart.     3. Rh Neg  - Rhogam 10/31    4 Tobacco Use   - counseled by LIN  - Growth US needed    Kely MARTÍNEZ CNM   PROVIDER:[TOKEN:[[id number]],FOLLOWUP:[1 week]]

## 2019-10-06 NOTE — PROGRESS NOTE ADULT - ASSESSMENT
49F s/p exploratory laparotomy with abdominal washout, lysis of adhesions, removal of retained fibroid x2.     Plan:   - continue FLD w/ ensure  - continue IV antibiotics   - encourage ambulation, IS   - WBC trending down: 20 > 16 > 15 > 15 > 11 > 10

## 2019-10-06 NOTE — DISCHARGE NOTE PROVIDER - NSDCCPCAREPLAN_GEN_ALL_CORE_FT
PRINCIPAL DISCHARGE DIAGNOSIS  Diagnosis: S/P laparoscopic hysterectomy  Assessment and Plan of Treatment: robotic-assisted with BSO      SECONDARY DISCHARGE DIAGNOSES  Diagnosis: SBO (small bowel obstruction)  Assessment and Plan of Treatment: resolved
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
## 2019-10-09 DIAGNOSIS — D68.52 PROTHROMBIN GENE MUTATION: ICD-10-CM

## 2019-10-09 DIAGNOSIS — N73.9 FEMALE PELVIC INFLAMMATORY DISEASE, UNSPECIFIED: ICD-10-CM

## 2019-10-09 DIAGNOSIS — Z88.0 ALLERGY STATUS TO PENICILLIN: ICD-10-CM

## 2019-10-09 DIAGNOSIS — I82.611 ACUTE EMBOLISM AND THROMBOSIS OF SUPERFICIAL VEINS OF RIGHT UPPER EXTREMITY: ICD-10-CM

## 2019-10-09 DIAGNOSIS — E87.3 ALKALOSIS: ICD-10-CM

## 2019-10-09 DIAGNOSIS — E87.6 HYPOKALEMIA: ICD-10-CM
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
## 2019-10-09 DIAGNOSIS — M19.90 UNSPECIFIED OSTEOARTHRITIS, UNSPECIFIED SITE: ICD-10-CM

## 2019-10-09 DIAGNOSIS — T81.12XA POSTPROCEDURAL SEPTIC SHOCK, INITIAL ENCOUNTER: ICD-10-CM

## 2019-10-09 DIAGNOSIS — Z85.3 PERSONAL HISTORY OF MALIGNANT NEOPLASM OF BREAST: ICD-10-CM

## 2019-10-09 DIAGNOSIS — R10.2 PELVIC AND PERINEAL PAIN: ICD-10-CM

## 2019-10-09 DIAGNOSIS — E87.5 HYPERKALEMIA: ICD-10-CM

## 2019-10-09 DIAGNOSIS — D28.2 BENIGN NEOPLASM OF UTERINE TUBES AND LIGAMENTS: ICD-10-CM

## 2019-10-09 DIAGNOSIS — F41.9 ANXIETY DISORDER, UNSPECIFIED: ICD-10-CM

## 2019-10-09 DIAGNOSIS — D62 ACUTE POSTHEMORRHAGIC ANEMIA: ICD-10-CM

## 2019-10-09 DIAGNOSIS — G51.0 BELL'S PALSY: ICD-10-CM

## 2019-10-09 DIAGNOSIS — K91.89 OTHER POSTPROCEDURAL COMPLICATIONS AND DISORDERS OF DIGESTIVE SYSTEM: ICD-10-CM

## 2019-10-09 DIAGNOSIS — R34 ANURIA AND OLIGURIA: ICD-10-CM

## 2019-10-09 DIAGNOSIS — Z91.040 LATEX ALLERGY STATUS: ICD-10-CM

## 2019-10-09 DIAGNOSIS — Z88.5 ALLERGY STATUS TO NARCOTIC AGENT: ICD-10-CM

## 2019-10-09 DIAGNOSIS — I80.8 PHLEBITIS AND THROMBOPHLEBITIS OF OTHER SITES: ICD-10-CM

## 2019-10-09 DIAGNOSIS — K56.7 ILEUS, UNSPECIFIED: ICD-10-CM

## 2019-10-09 DIAGNOSIS — T81.82XA EMPHYSEMA (SUBCUTANEOUS) RESULTING FROM A PROCEDURE, INITIAL ENCOUNTER: ICD-10-CM

## 2019-10-09 DIAGNOSIS — D25.9 LEIOMYOMA OF UTERUS, UNSPECIFIED: ICD-10-CM

## 2019-10-09 DIAGNOSIS — Z90.49 ACQUIRED ABSENCE OF OTHER SPECIFIED PARTS OF DIGESTIVE TRACT: ICD-10-CM

## 2019-10-09 DIAGNOSIS — I95.89 OTHER HYPOTENSION: ICD-10-CM

## 2019-10-09 DIAGNOSIS — R00.0 TACHYCARDIA, UNSPECIFIED: ICD-10-CM

## 2019-10-09 DIAGNOSIS — Z86.711 PERSONAL HISTORY OF PULMONARY EMBOLISM: ICD-10-CM

## 2019-10-09 DIAGNOSIS — A41.9 SEPSIS, UNSPECIFIED ORGANISM: ICD-10-CM
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
## 2019-10-10 ENCOUNTER — APPOINTMENT (OUTPATIENT)
Dept: OBGYN | Facility: CLINIC | Age: 49
End: 2019-10-10
Payer: MEDICARE

## 2019-10-10 ENCOUNTER — OUTPATIENT (OUTPATIENT)
Dept: OUTPATIENT SERVICES | Facility: HOSPITAL | Age: 49
LOS: 1 days | Discharge: HOME | End: 2019-10-10

## 2019-10-10 VITALS — SYSTOLIC BLOOD PRESSURE: 144 MMHG | DIASTOLIC BLOOD PRESSURE: 87 MMHG

## 2019-10-10 DIAGNOSIS — Z98.890 OTHER SPECIFIED POSTPROCEDURAL STATES: Chronic | ICD-10-CM

## 2019-10-10 DIAGNOSIS — Z90.49 ACQUIRED ABSENCE OF OTHER SPECIFIED PARTS OF DIGESTIVE TRACT: Chronic | ICD-10-CM

## 2019-10-10 PROCEDURE — 99024 POSTOP FOLLOW-UP VISIT: CPT

## 2019-10-15 DIAGNOSIS — Z48.89 ENCOUNTER FOR OTHER SPECIFIED SURGICAL AFTERCARE: ICD-10-CM

## 2019-11-05 ENCOUNTER — APPOINTMENT (OUTPATIENT)
Dept: OBGYN | Facility: CLINIC | Age: 49
End: 2019-11-05
Payer: MEDICARE

## 2019-11-05 ENCOUNTER — OUTPATIENT (OUTPATIENT)
Dept: OUTPATIENT SERVICES | Facility: HOSPITAL | Age: 49
LOS: 1 days | Discharge: HOME | End: 2019-11-05

## 2019-11-05 VITALS
WEIGHT: 161 LBS | HEIGHT: 65 IN | DIASTOLIC BLOOD PRESSURE: 82 MMHG | BODY MASS INDEX: 26.82 KG/M2 | SYSTOLIC BLOOD PRESSURE: 138 MMHG

## 2019-11-05 DIAGNOSIS — Z48.89 ENCOUNTER FOR OTHER SPECIFIED SURGICAL AFTERCARE: ICD-10-CM

## 2019-11-05 DIAGNOSIS — Z98.890 OTHER SPECIFIED POSTPROCEDURAL STATES: Chronic | ICD-10-CM

## 2019-11-05 DIAGNOSIS — Z90.49 ACQUIRED ABSENCE OF OTHER SPECIFIED PARTS OF DIGESTIVE TRACT: Chronic | ICD-10-CM

## 2019-11-05 PROCEDURE — 99024 POSTOP FOLLOW-UP VISIT: CPT

## 2019-11-08 DIAGNOSIS — Z48.89 ENCOUNTER FOR OTHER SPECIFIED SURGICAL AFTERCARE: ICD-10-CM

## 2019-11-14 NOTE — HISTORY OF PRESENT ILLNESS
[5/10] : the patient reports pain that is 5/10 in severity [Doing Well] : is doing well [Clean/Dry/Intact] : clean, dry and intact [Staples Removed] : staples were removed [Fever] : no fever [Nausea] : no nausea [Vomiting] : no vomiting [Vaginal Bleeding] : no vaginal bleeding [de-identified] : 48 yo s/p RATLH BSO, cystoscopy for fibroid uterus on 9/17/19 complicated by ileus and NGT placement; pelvic abscess and right flank mass s/p exlap removal of right flank mass, NEMO, washout, drainage of pelvic abscess on 10/1/19, during exam in the OR 1cm defect in vaginal cuff. [de-identified] : abdomen soft, mildly tender to palpation, no acute abdomen. Laparoscopic incisions well healed. Laparotomy incision healing well, staples removed. no dehiscence [de-identified] : rtc in 4-5 weeks for check up

## 2019-12-12 ENCOUNTER — APPOINTMENT (OUTPATIENT)
Dept: OBGYN | Facility: CLINIC | Age: 49
End: 2019-12-12

## 2020-01-02 ENCOUNTER — APPOINTMENT (OUTPATIENT)
Dept: OBGYN | Facility: CLINIC | Age: 50
End: 2020-01-02

## 2020-05-31 ENCOUNTER — TRANSCRIPTION ENCOUNTER (OUTPATIENT)
Age: 50
End: 2020-05-31

## 2020-06-18 ENCOUNTER — EMERGENCY (EMERGENCY)
Facility: HOSPITAL | Age: 50
LOS: 0 days | Discharge: HOME | End: 2020-06-18
Attending: EMERGENCY MEDICINE | Admitting: EMERGENCY MEDICINE
Payer: MEDICARE

## 2020-06-18 VITALS
RESPIRATION RATE: 20 BRPM | HEART RATE: 97 BPM | TEMPERATURE: 98 F | OXYGEN SATURATION: 98 % | DIASTOLIC BLOOD PRESSURE: 97 MMHG | SYSTOLIC BLOOD PRESSURE: 168 MMHG

## 2020-06-18 VITALS
TEMPERATURE: 98 F | SYSTOLIC BLOOD PRESSURE: 168 MMHG | DIASTOLIC BLOOD PRESSURE: 97 MMHG | HEART RATE: 97 BPM | OXYGEN SATURATION: 98 % | RESPIRATION RATE: 20 BRPM

## 2020-06-18 DIAGNOSIS — S61.452D OPEN BITE OF LEFT HAND, SUBSEQUENT ENCOUNTER: ICD-10-CM

## 2020-06-18 DIAGNOSIS — Z91.040 LATEX ALLERGY STATUS: ICD-10-CM

## 2020-06-18 DIAGNOSIS — Z88.5 ALLERGY STATUS TO NARCOTIC AGENT: ICD-10-CM
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
## 2020-06-18 DIAGNOSIS — Z90.49 ACQUIRED ABSENCE OF OTHER SPECIFIED PARTS OF DIGESTIVE TRACT: Chronic | ICD-10-CM

## 2020-06-18 DIAGNOSIS — Z98.890 OTHER SPECIFIED POSTPROCEDURAL STATES: Chronic | ICD-10-CM

## 2020-06-18 DIAGNOSIS — Z29.14 ENCOUNTER FOR PROPHYLACTIC RABIES IMMUNE GLOBIN: ICD-10-CM

## 2020-06-18 DIAGNOSIS — Z90.89 ACQUIRED ABSENCE OF OTHER ORGANS: ICD-10-CM

## 2020-06-18 DIAGNOSIS — Z98.890 OTHER SPECIFIED POSTPROCEDURAL STATES: ICD-10-CM

## 2020-06-18 DIAGNOSIS — Z23 ENCOUNTER FOR IMMUNIZATION: ICD-10-CM

## 2020-06-18 PROCEDURE — 99284 EMERGENCY DEPT VISIT MOD MDM: CPT

## 2020-06-18 RX ORDER — TETANUS TOXOID, REDUCED DIPHTHERIA TOXOID AND ACELLULAR PERTUSSIS VACCINE, ADSORBED 5; 2.5; 8; 8; 2.5 [IU]/.5ML; [IU]/.5ML; UG/.5ML; UG/.5ML; UG/.5ML
0.5 SUSPENSION INTRAMUSCULAR ONCE
Refills: 0 | Status: COMPLETED | OUTPATIENT
Start: 2020-06-18 | End: 2020-06-18

## 2020-06-18 RX ORDER — HUMAN RABIES VIRUS IMMUNE GLOBULIN 150 [IU]/ML
1500 INJECTION, SOLUTION INTRAMUSCULAR ONCE
Refills: 0 | Status: COMPLETED | OUTPATIENT
Start: 2020-06-18 | End: 2020-06-18

## 2020-06-18 RX ORDER — RABIES VACC, HUMAN DIPLOID/PF 2.5 UNIT
1 VIAL (EA) INTRAMUSCULAR ONCE
Refills: 0 | Status: COMPLETED | OUTPATIENT
Start: 2020-06-18 | End: 2020-06-18

## 2020-06-18 RX ORDER — HUMAN RABIES VIRUS IMMUNE GLOBULIN 150 [IU]/ML
1550 INJECTION, SOLUTION INTRAMUSCULAR ONCE
Refills: 0 | Status: DISCONTINUED | OUTPATIENT
Start: 2020-06-18 | End: 2020-06-18

## 2020-06-18 RX ADMIN — HUMAN RABIES VIRUS IMMUNE GLOBULIN 1500 UNIT(S): 150 INJECTION, SOLUTION INTRAMUSCULAR at 11:43

## 2020-06-18 RX ADMIN — TETANUS TOXOID, REDUCED DIPHTHERIA TOXOID AND ACELLULAR PERTUSSIS VACCINE, ADSORBED 0.5 MILLILITER(S): 5; 2.5; 8; 8; 2.5 SUSPENSION INTRAMUSCULAR at 11:37

## 2020-06-18 RX ADMIN — Medication 1 MILLILITER(S): at 11:38

## 2020-06-18 NOTE — ED PROVIDER NOTE - NSFOLLOWUPINSTRUCTIONS_ED_ALL_ED_FT
RETURN FOR SECOND DOSE OF RABIES 6/21/2020        RABIES VACCINE - General Information     Rabies Vaccine    WHAT YOU NEED TO KNOW:    What is the rabies vaccine? The rabies vaccine is an injection given to help prevent rabies. The rabies virus is spread to humans through the bite of an infected animal. Dogs, bats, skunks, coyotes, raccoons, and foxes are examples of animals that can carry rabies. The rabies vaccine can protect you from being infected with the virus. The vaccine can also prevent you from developing rabies even if you get it after you were bitten by an animal.     When is the vaccine given? Your healthcare provider will tell you how many doses of the vaccine you need. You may need booster shots if you are at high risk for rabies. The following is a common dosing schedule:     Before exposure to the virus, the vaccine is given in 3 doses. The first dose can be given at any time. The second dose is given 7 days after the first. The third dose is given 21 to 28 days after the first. Plan to get all of the doses 3 to 4 weeks before you travel.       After exposure to the virus, the vaccine is given in either 2 or 4 doses:   A person who has not already had the vaccine will usually get 4 doses. The first dose is given immediately. The other doses are given on days 3, 7, and 14 after the exposure. A shot called Rabies Immune Globulin is given at the same time as the first dose. This medicine helps increase your immune system to fight infection.       A person who has already had the vaccine will usually get 2 doses. The first is given immediately, and the second is given on day 3 after the exposure. Rabies Immune Globulin is not given.    Who should get the rabies vaccine?     Anyone who was bitten by an animal that can carry rabies may need the vaccine      Anyone at high risk for rabies, such as people who work with or care for animals or in a rabies laboratory      Anyone who goes into caves where bats live      Anyone who may have had contact with an infected animal      Anyone traveling to another country where rabies is common    Who should not get the rabies vaccine or should wait to get it?     Tell your healthcare provider if you had a severe allergic reaction to a dose of the rabies vaccine in the past, or to other vaccines. If you are getting the vaccine before exposure, do not get another dose. After exposure, you need to get all the doses even if you are at risk for an allergic reaction. Your healthcare provider may need to take extra precautions before you get another dose.      Tell your healthcare provider about all of your allergies. Also tell him if you have a disease that affects your immune system (such as HIV/AIDS) or you have cancer. Tell him if you are taking medicines that affect your immune system or any cancer treatment drug or radiation. Tell him if you are ill. You may need to wait to get the vaccine until you feel better.     What are the risks of the rabies vaccine? You may have a severe allergic reaction. The area where you got the shot may become red, swollen, or painful. You may develop a headache or muscle aches. You may have nausea or pain in your abdomen. You may develop rabies even after you get the vaccine.    Call 911 for any of the following:     Your mouth and throat are swollen.      You are wheezing or have trouble breathing.      You have chest pain or your heart is beating faster than normal for you.      You feel like you are going to faint.    When should I seek immediate care?     Your face is red or swollen.      You have hives that spread over your body.      You feel weak or dizzy.    When should I contact my healthcare provider?     You have increased pain, redness, or swelling around the area where the shot was given.      You have flu-like symptoms.      You have questions or concerns about the rabies vaccine.    CARE AGREEMENT:    You have the right to help plan your care. Learn about your health condition and how it may be treated. Discuss treatment options with your healthcare providers to decide what care you want to receive. You always have the right to refuse treatment.        © Copyright [employer] 2019 All illustrations and images included in CareNotes are the copyrighted property of [employer] or [employer].

## 2020-06-18 NOTE — ED ADULT TRIAGE NOTE - CHIEF COMPLAINT QUOTE
"I'm here for a wound check." Pt advises she was bit by a stray cat two weeks ago on her left thumb. She was treated with antibiotics. She did not get a tetanus shot at that time. Pt advises the cat is now sick and is not improving. The cat may be put down today. There is a question of rabies.

## 2020-06-18 NOTE — ED PROVIDER NOTE - NS ED ROS FT
Review of Systems    Constitutional: (-) fever or chills  respiratory: (-) cough (-) shortness of breath  msk: no thumb pain  Integumentary: (+)healing wound left hand   Neurological: (-) altered mental status, headache or head injury

## 2020-06-18 NOTE — ED PROVIDER NOTE - ATTENDING CONTRIBUTION TO CARE
pt for rabies series, no symptoms, no infection at site, will initiate, may stop if/when rabies status of cat known

## 2020-06-18 NOTE — ED PROVIDER NOTE - PHYSICAL EXAMINATION
skin: left thumb - +healing wound , no surrounding erythema, swelling or tenderness, no lymphangitis

## 2020-06-18 NOTE — ED PROVIDER NOTE - OBJECTIVE STATEMENT
49 y/o female presents to the ED s/p feral cat bite 2 weeks ago to left thumb. patient treated with po antibx with healing wound. no fevers, streaking, redness or pain to left thumb. As per patient, cat now residing with her sister, taken to vet due to behavior issues and now cat being euthanized today. patient to the Ed for rabies series . patient denies any fever,chills, headache, visual changes, dizziness, neck or back pain.

## 2020-06-18 NOTE — ED PROVIDER NOTE - PATIENT PORTAL LINK FT
You can access the FollowMyHealth Patient Portal offered by [facility] by registering at the following website: [email or web address]. By joining [employer]’s FollowMyHealth portal, you will also be able to view your health information using other applications (apps) compatible with our system.

## 2020-06-21 ENCOUNTER — EMERGENCY (EMERGENCY)
Facility: HOSPITAL | Age: 50
LOS: 0 days | Discharge: HOME | End: 2020-06-21
Attending: EMERGENCY MEDICINE | Admitting: EMERGENCY MEDICINE
Payer: MEDICARE

## 2020-06-21 VITALS
WEIGHT: 169.98 LBS | HEIGHT: 65 IN | RESPIRATION RATE: 20 BRPM | DIASTOLIC BLOOD PRESSURE: 93 MMHG | TEMPERATURE: 98 F | OXYGEN SATURATION: 100 % | HEART RATE: 83 BPM | SYSTOLIC BLOOD PRESSURE: 127 MMHG

## 2020-06-21 DIAGNOSIS — Z91.040 LATEX ALLERGY STATUS: ICD-10-CM

## 2020-06-21 DIAGNOSIS — Z23 ENCOUNTER FOR IMMUNIZATION: ICD-10-CM

## 2020-06-21 DIAGNOSIS — Z91.041 RADIOGRAPHIC DYE ALLERGY STATUS: ICD-10-CM

## 2020-06-21 DIAGNOSIS — Z88.5 ALLERGY STATUS TO NARCOTIC AGENT: ICD-10-CM

## 2020-06-21 DIAGNOSIS — Z85.3 PERSONAL HISTORY OF MALIGNANT NEOPLASM OF BREAST: ICD-10-CM

## 2020-06-21 DIAGNOSIS — Z88.0 ALLERGY STATUS TO PENICILLIN: ICD-10-CM

## 2020-06-21 DIAGNOSIS — Z90.49 ACQUIRED ABSENCE OF OTHER SPECIFIED PARTS OF DIGESTIVE TRACT: Chronic | ICD-10-CM
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
## 2020-06-21 DIAGNOSIS — S61.052D OPEN BITE OF LEFT THUMB WITHOUT DAMAGE TO NAIL, SUBSEQUENT ENCOUNTER: ICD-10-CM

## 2020-06-21 DIAGNOSIS — Z90.49 ACQUIRED ABSENCE OF OTHER SPECIFIED PARTS OF DIGESTIVE TRACT: ICD-10-CM

## 2020-06-21 DIAGNOSIS — Z98.890 OTHER SPECIFIED POSTPROCEDURAL STATES: Chronic | ICD-10-CM

## 2020-06-21 DIAGNOSIS — Z90.12 ACQUIRED ABSENCE OF LEFT BREAST AND NIPPLE: ICD-10-CM

## 2020-06-21 PROCEDURE — 99282 EMERGENCY DEPT VISIT SF MDM: CPT

## 2020-06-21 RX ORDER — RABIES VACC, HUMAN DIPLOID/PF 2.5 UNIT
1 VIAL (EA) INTRAMUSCULAR ONCE
Refills: 0 | Status: COMPLETED | OUTPATIENT
Start: 2020-06-21 | End: 2020-06-21

## 2020-06-21 RX ADMIN — Medication 1 MILLILITER(S): at 09:21

## 2020-06-21 NOTE — ED PROVIDER NOTE - PHYSICAL EXAMINATION
GEN: Alert & Oriented x 3, No acute distress. Calm, appropriate.  Head and Neck: Normocephalic, atraumatic.   MS: Grossly moving extremities.  SKIN: no rashes/lesions, no petechiae, no ecchymosis. Healed bite wound to left thumb. no erythema, warmth or fluctuance.   NEURO: CN II-XII grossly intact.

## 2020-06-21 NOTE — ED PROVIDER NOTE - NS ED ROS FT
GEN: (-) fever, (-) chills  HEENT: (-) vision changes, (-) HA  GI: (-) abdominal pain,(-) Nausea, (-) Vomiting  NEURO: (-) weakness  SKIN: (-) rashes, (-) new lesions  HEME: (-) bleeding, (-) ecchymosis

## 2020-06-21 NOTE — ED PROVIDER NOTE - NSFOLLOWUPINSTRUCTIONS_ED_ALL_ED_FT
RETURN TO ED ON JUNE 25TH FOR 3RD SHOT.    Rabies Vaccine (By injection)  Rabies Vaccine (RAY-beez VAX-een)    Prevents infection caused by rabies virus. The vaccine can be given before or after you are exposed to the rabies virus.    Brand Name(s):Imovax Rabies,RabAvert  There may be other brand names for this medicine.    When This Medicine Should Not Be Used:  This medicine is not right for everyone. You should not receive this vaccine if you had an allergic reaction to rabies vaccine.    How to Use This Medicine:  Injectable    Your doctor will prescribe your exact dose and tell you how often it should be given. This medicine is given as a shot into one of your muscles. The vaccine is injected into the upper arm muscle. Very young or small children may have the vaccine injected into the upper leg muscle.  A nurse or other health provider will give you this medicine.   You are at risk for exposure to the rabies virus if you work with animals or will be going to a country where rabies is common. People who are at risk of being exposed to rabies will receive 3 doses on 3 different days within a 1-month period.  If you have received the vaccine in the past and have been exposed to the rabies virus, you will need to receive 2 doses on 2 different days within a 1-month period.  If you have not yet received the vaccine and were exposed to the rabies virus, you will need a total of 5 doses on 5 different days within a 1-month period. You will also receive a shot of rabies immune globulin.  It is very important that you have the shots on the exact day your doctor tells you to.  Missed dose: You must use this medicine on a fixed schedule. Call your doctor or pharmacist if you miss a dose.   Drugs and Foods to Avoid:  Ask your doctor or pharmacist before using any other medicine, including over-the-counter medicines, vitamins, and herbal products.    Tell your doctor before you receive this vaccine if you take medicine that weakens your immune system, such as a steroid (including dexamethasone, hydrocortisone, methylprednisolone, prednisolone, prednisone) or cancer treatment.  Warnings While Using This Medicine:    Tell your doctor if you are pregnant or breastfeeding. Tell your doctor if you have had an allergic reaction to any type of vaccine, if you have an illness with fever, or if you have an immune system problem.  This medicine is made from donated human blood. Some human blood products have transmitted viruses to people who have received them, although the risk is low. Human donors and donated blood are both tested for viruses to keep the transmission risk low. Talk with your doctor about this risk if you are concerned.  Your doctor will do lab tests at regular visits to check on the effects of this medicine. Keep all appointments.   Possible Side Effects While Using This Medicine:  Call your doctor right away if you notice any of these side effects:    Allergic reaction: Itching or hives, swelling in your face or hands, swelling or tingling in your mouth or throat, chest tightness, trouble breathing  Muscle pain, stiffness, or weakness  Numbness, tingling, or burning pain in your hands, arms, legs, or feet  If you notice these less serious side effects, talk with your doctor:    Dizziness, headache  Fever  Itching, pain, redness, or swelling where the shot was given  Nausea, stomach pain  Tiredness  If you notice other side effects that you think are caused by this medicine, tell your doctor.  Call your doctor for medical advice about side effects. You may report side effects to FDA at 8-083-ALU-7948

## 2020-06-21 NOTE — ED PROVIDER NOTE - OBJECTIVE STATEMENT
The pt is a 50y F with no significant PMH is presenting to ED for day 3 for rabies vaccine. pt States she was bitten by a feral cat that she had to put down because it was acting weird. She states she received the immune globulin and first rabies vaccine 3 days ago. Pt here for second dose. Pt denies any medical complaints, no HA, visual changes, dizziness, lightheadedness, fever/chills. Pt denies any reaction from first vaccination.

## 2020-06-21 NOTE — ED PROVIDER NOTE - PATIENT PORTAL LINK FT
You can access the FollowMyHealth Patient Portal offered by [facility] by registering at the following website: [email or web address]. By joining [employer]’s FollowMyHealth portal, you will also be able to view your health information using other applications (apps) compatible with our system.

## 2020-06-25 ENCOUNTER — EMERGENCY (EMERGENCY)
Facility: HOSPITAL | Age: 50
LOS: 0 days | Discharge: HOME | End: 2020-06-25
Attending: EMERGENCY MEDICINE | Admitting: EMERGENCY MEDICINE
Payer: MEDICARE

## 2020-06-25 VITALS
RESPIRATION RATE: 18 BRPM | TEMPERATURE: 98 F | SYSTOLIC BLOOD PRESSURE: 125 MMHG | WEIGHT: 169.98 LBS | DIASTOLIC BLOOD PRESSURE: 79 MMHG | HEART RATE: 90 BPM | OXYGEN SATURATION: 97 % | HEIGHT: 65 IN

## 2020-06-25 DIAGNOSIS — Z88.0 ALLERGY STATUS TO PENICILLIN: ICD-10-CM

## 2020-06-25 DIAGNOSIS — Z98.890 OTHER SPECIFIED POSTPROCEDURAL STATES: ICD-10-CM

## 2020-06-25 DIAGNOSIS — Z20.3 CONTACT WITH AND (SUSPECTED) EXPOSURE TO RABIES: ICD-10-CM

## 2020-06-25 DIAGNOSIS — Z98.890 OTHER SPECIFIED POSTPROCEDURAL STATES: Chronic | ICD-10-CM

## 2020-06-25 DIAGNOSIS — Z90.49 ACQUIRED ABSENCE OF OTHER SPECIFIED PARTS OF DIGESTIVE TRACT: Chronic | ICD-10-CM

## 2020-06-25 DIAGNOSIS — Z23 ENCOUNTER FOR IMMUNIZATION: ICD-10-CM

## 2020-06-25 DIAGNOSIS — Z85.3 PERSONAL HISTORY OF MALIGNANT NEOPLASM OF BREAST: ICD-10-CM

## 2020-06-25 DIAGNOSIS — F41.9 ANXIETY DISORDER, UNSPECIFIED: ICD-10-CM

## 2020-06-25 DIAGNOSIS — Z88.5 ALLERGY STATUS TO NARCOTIC AGENT: ICD-10-CM

## 2020-06-25 DIAGNOSIS — Z91.041 RADIOGRAPHIC DYE ALLERGY STATUS: ICD-10-CM

## 2020-06-25 PROCEDURE — 99282 EMERGENCY DEPT VISIT SF MDM: CPT

## 2020-06-25 RX ORDER — RABIES VACC, HUMAN DIPLOID/PF 2.5 UNIT
1 VIAL (EA) INTRAMUSCULAR ONCE
Refills: 0 | Status: COMPLETED | OUTPATIENT
Start: 2020-06-25 | End: 2020-06-25

## 2020-06-25 RX ADMIN — Medication 1 MILLILITER(S): at 10:53

## 2020-06-25 NOTE — ED PROVIDER NOTE - ATTENDING CONTRIBUTION TO CARE
I was present for and supervised the key and critical aspects of the procedures performed during the care of the patient. ATTENDING NOTE: 51 y/o F presents to the ED for rabies vaccination. 7 days ago, Pt was bit by a stray cat that ended up being put down for unusual behavior. At that time, Pt received first rabies vaccination and is here for third dose. Pt is without any complaints, has no fevers, chills, n/v/d, cough, CP or SOB and has no trouble tolerating PO. On exam: NCAT. RRR, S1S2 noted. Radial pulses 2+ and equal, pedal pulses 2+ and equal. (-) Signs of infection, (-) ascending erythema or swelling. Wound healing well with no pain. A/P: Pt given vaccine, will return for last dose in 1 week.

## 2020-06-25 NOTE — ED PROVIDER NOTE - PROGRESS NOTE DETAILS
: pt given day 7 of rabies vaccine and will return to ED in 7d for final vaccine ATTENDING NOTE: 51 y/o F presents to the ED for rabies vaccination. 7 days ago, Pt was bit by a stray cat that ended up being put down for unusual behavior. At that time, Pt received first rabies vaccination and is here for third dose. Pt is without any complaints, has no fevers, chills, n/v/d, cough, CP or SOB and has no trouble tolerating PO. On exam: NCAT. RRR, S1S2 noted. Radial pulses 2+ and equal, pedal pulses 2+ and equal. (-) Signs of infection, (-) ascending erythema or swelling. Wound healing well with no pain. A/P: Pt given vaccine, will return for last dose in 1 week.

## 2020-06-25 NOTE — ED PROVIDER NOTE - OBJECTIVE STATEMENT
The pt is a 50y F  is presenting to ED for day 7 for rabies vaccine. pt States she was bitten by a feral cat that she had to put down because it was acting weird. She states she received the immune globulin and first rabies vaccine 7 days ago. Pt here for third dose. Pt denies any medical complaints, no HA, visual changes, dizziness, lightheadedness, fever/chills. Pt denies any reaction from first vaccination.

## 2020-06-25 NOTE — ED PROVIDER NOTE - PATIENT PORTAL LINK FT
You can access the FollowMyHealth Patient Portal offered by [facility] by registering at the following website: [email or web address]. By joining [email or web address]’s FollowMyHealth portal, you will also be able to view your health information using other applications (apps) compatible with our system.

## 2020-06-25 NOTE — ED PROVIDER NOTE - NSFOLLOWUPINSTRUCTIONS_ED_ALL_ED_FT
Follow up with your primary medical doctor in 1-2 days, as well as follow the vaccination schedule for rabies which was provided to you at discharge      RABIES VACCINE - General Information     Rabies Vaccine    WHAT YOU NEED TO KNOW:    What is the rabies vaccine? The rabies vaccine is an injection given to help prevent rabies. The rabies virus is spread to humans through the bite of an infected animal. Dogs, bats, skunks, coyotes, raccoons, and foxes are examples of animals that can carry rabies. The rabies vaccine can protect you from being infected with the virus. The vaccine can also prevent you from developing rabies even if you get it after you were bitten by an animal.     When is the vaccine given? Your healthcare provider will tell you how many doses of the vaccine you need. You may need booster shots if you are at high risk for rabies. The following is a common dosing schedule:     Before exposure to the virus, the vaccine is given in 3 doses. The first dose can be given at any time. The second dose is given 7 days after the first. The third dose is given 21 to 28 days after the first. Plan to get all of the doses 3 to 4 weeks before you travel.       After exposure to the virus, the vaccine is given in either 2 or 4 doses:   A person who has not already had the vaccine will usually get 4 doses. The first dose is given immediately. The other doses are given on days 3, 7, and 14 after the exposure. A shot called Rabies Immune Globulin is given at the same time as the first dose. This medicine helps increase your immune system to fight infection.       A person who has already had the vaccine will usually get 2 doses. The first is given immediately, and the second is given on day 3 after the exposure. Rabies Immune Globulin is not given.    Who should get the rabies vaccine?     Anyone who was bitten by an animal that can carry rabies may need the vaccine      Anyone at high risk for rabies, such as people who work with or care for animals or in a rabies laboratory      Anyone who goes into caves where bats live      Anyone who may have had contact with an infected animal      Anyone traveling to another country where rabies is common    Who should not get the rabies vaccine or should wait to get it?     Tell your healthcare provider if you had a severe allergic reaction to a dose of the rabies vaccine in the past, or to other vaccines. If you are getting the vaccine before exposure, do not get another dose. After exposure, you need to get all the doses even if you are at risk for an allergic reaction. Your healthcare provider may need to take extra precautions before you get another dose.      Tell your healthcare provider about all of your allergies. Also tell him if you have a disease that affects your immune system (such as HIV/AIDS) or you have cancer. Tell him if you are taking medicines that affect your immune system or any cancer treatment drug or radiation. Tell him if you are ill. You may need to wait to get the vaccine until you feel better.     What are the risks of the rabies vaccine? You may have a severe allergic reaction. The area where you got the shot may become red, swollen, or painful. You may develop a headache or muscle aches. You may have nausea or pain in your abdomen. You may develop rabies even after you get the vaccine.    Call 911 for any of the following:     Your mouth and throat are swollen.      You are wheezing or have trouble breathing.      You have chest pain or your heart is beating faster than normal for you.      You feel like you are going to faint.    When should I seek immediate care?     Your face is red or swollen.      You have hives that spread over your body.      You feel weak or dizzy.    When should I contact my healthcare provider?     You have increased pain, redness, or swelling around the area where the shot was given.      You have flu-like symptoms.      You have questions or concerns about the rabies vaccine.    CARE AGREEMENT:    You have the right to help plan your care. Learn about your health condition and how it may be treated. Discuss treatment options with your healthcare providers to decide what care you want to receive. You always have the right to refuse treatment.        © Copyright [employer] 2019 All illustrations and images included in CareNotes are the copyrighted property of [employer] or [employer].

## 2020-06-25 NOTE — ED PROVIDER NOTE - PHYSICAL EXAMINATION
Physical Exam    Vital Signs: I have reviewed the initial vital signs.  Constitutional: well-nourished, appears stated age, no acute distress  Eyes: Conjunctiva pink, Sclera clear, PERRLA, EOMI.  Musculoskeletal: supple neck, no lower extremity edema, no midline tenderness  Integumentary: warm, dry, no rash. Cat bit L hand first digit well healed, no erythem, swelling, pain or dc  Neurologic: awake, alert, cranial nerves II-XII grossly intact, extremities’ motor and sensory functions grossly intact  Psychiatric: appropriate mood, appropriate affect

## 2020-07-02 ENCOUNTER — EMERGENCY (EMERGENCY)
Facility: HOSPITAL | Age: 50
LOS: 0 days | Discharge: HOME | End: 2020-07-02
Attending: EMERGENCY MEDICINE | Admitting: EMERGENCY MEDICINE
Payer: MEDICARE

## 2020-07-02 VITALS
HEIGHT: 65 IN | DIASTOLIC BLOOD PRESSURE: 81 MMHG | SYSTOLIC BLOOD PRESSURE: 142 MMHG | RESPIRATION RATE: 18 BRPM | HEART RATE: 84 BPM | TEMPERATURE: 98 F | OXYGEN SATURATION: 98 %

## 2020-07-02 DIAGNOSIS — Z98.890 OTHER SPECIFIED POSTPROCEDURAL STATES: Chronic | ICD-10-CM

## 2020-07-02 DIAGNOSIS — Z29.14 ENCOUNTER FOR PROPHYLACTIC RABIES IMMUNE GLOBIN: ICD-10-CM

## 2020-07-02 DIAGNOSIS — Z90.49 ACQUIRED ABSENCE OF OTHER SPECIFIED PARTS OF DIGESTIVE TRACT: Chronic | ICD-10-CM

## 2020-07-02 DIAGNOSIS — Z88.0 ALLERGY STATUS TO PENICILLIN: ICD-10-CM

## 2020-07-02 DIAGNOSIS — Z91.040 LATEX ALLERGY STATUS: ICD-10-CM

## 2020-07-02 PROCEDURE — 99283 EMERGENCY DEPT VISIT LOW MDM: CPT

## 2020-07-02 RX ORDER — RABIES VACC, HUMAN DIPLOID/PF 2.5 UNIT
1 VIAL (EA) INTRAMUSCULAR ONCE
Refills: 0 | Status: COMPLETED | OUTPATIENT
Start: 2020-07-02 | End: 2020-07-02

## 2020-07-02 RX ADMIN — Medication 1 MILLILITER(S): at 10:40

## 2020-07-02 NOTE — ED PROVIDER NOTE - ATTENDING CONTRIBUTION TO CARE
49yo F presents for last dose in rabies vaccine series. Pt states was bitten by cat. Finished ABx, no signs of infection where she was bitten. Pt asymptomatic.     Vital signs reviewed  GENERAL: Patient well appearing, NAD  MUSCULOSKELETAL/EXTREMITIES: Brisk cap refill. Equal radial pulses.   SKIN:  Left thumb without erythema, purulence/discharge  NEURO: AAOx3. GCS 15. Speech clear and coherent. Answering questions appropriately. Ambulating normally, no gait abnormality. No gross FND.

## 2020-07-02 NOTE — ED PROVIDER NOTE - OBJECTIVE STATEMENT
50y F [facility] as listed presents for series rabies vaccination after being bitten by a cat, denies any complaint at this time

## 2020-07-02 NOTE — ED PROVIDER NOTE - PHYSICAL EXAMINATION
CONST: Well appearing in NAD  EYES: Sclera and conjunctiva clear.   ENT: No nasal discharge. Oropharynx normal appearing, no erythema or exudates. No abscess or swelling. Uvula midline.   NECK: Non-tender, no meningeal signs. normal ROM. supple   CARD: S1 S2; No jvd  RESP: Equal BS B/L, No wheezes, rhonchi or rales. No distress  GI: Soft, non-tender, non-distended. no cva tenderness. normal BS  MS: Normal ROM in all extremities. pulses 2 +. no calf tenderness or swelling  SKIN: Warm, dry, no acute rashes. Good turgor  NEURO: A&Ox3, No focal deficits. Strength 5/5 with no sensory deficits. Steady gait.

## 2020-07-02 NOTE — ED ADULT NURSE NOTE - NSIMPLEMENTINTERV_GEN_ALL_ED
Implemented All Fall Risk Interventions:  [place] to call system. Call bell, personal items and telephone within reach. Instruct patient to call for assistance. Room bathroom lighting operational. Non-slip footwear when patient is off stretcher. Physically safe environment: no spills, clutter or unnecessary equipment. Stretcher in lowest position, wheels locked, appropriate side rails in place. Provide visual cue, wrist band, yellow gown, etc. Monitor gait and stability. Monitor for mental status changes and reorient to person, place, and time. Review medications for side effects contributing to fall risk. Reinforce activity limits and safety measures with patient and family.

## 2020-07-02 NOTE — ED PROVIDER NOTE - PATIENT PORTAL LINK FT
You can access the FollowMyHealth Patient Portal offered by [facility] by registering at the following website: [email or web address]. By joining [employer]’s FollowMyHealth portal, you will also be able to view your health information using other applications (apps) compatible with our system.

## 2020-07-02 NOTE — ED PROVIDER NOTE - NSFOLLOWUPINSTRUCTIONS_ED_ALL_ED_FT
Rabies    WHAT YOU NEED TO KNOW:    Rabies is a disease that affects the body's central nervous system (brain, spinal cord, and nerves). Rabies is caused by a virus. You may get the virus if you come into contact with the saliva or other tissue of an infected animal. Rabies infection usually happens through a bite wound. Animals that may spread rabies include dogs, cats, coyotes, raccoons, foxes, skunks, and bats. Rabies develops when the virus enters the skin and goes to the muscles or nerves.          DISCHARGE INSTRUCTIONS:    Call 911 for any of the following:     You have trouble swallowing or slurred speech.      You have double vision, or you see things that are not really there.       You begin twitching, have muscle cramps, or have a seizure.     Return to the emergency department if:     You think you were exposed to rabies.       You were bitten by an animal.       You feel weak, tired, dizzy, confused, restless, or anxious.     Contact your healthcare provider if:     You have a fever.      Your signs and symptoms do not get better after treatment.       You have questions or concerns about rabies and rabies treatment.     Medicines:     Medicines such as the rabies vaccine or immune globulin may be given. These medicines help your body fight the virus and prevent rabies.      Take your medicine as directed. Contact your healthcare provider if you think your medicine is not helping or if you have side effects. Tell him or her if you are allergic to any medicine. Keep a list of the medicines, vitamins, and herbs you take. Include the amounts, and when and why you take them. Bring the list or the pill bottles to follow-up visits. Carry your medicine list with you in case of an emergency.    Follow up with your healthcare provider as directed: Write down your questions so you remember to ask them during your visits.    Prevent rabies:     Ask your healthcare provider about the rabies vaccine. You may need the vaccine if your work puts you at risk for rabies. You may also need the vaccine if you plan to travel to places where the risk for rabies is high. Ask for more information on rabies shots.      Avoid contact with animals. Do not approach any tame or wild animal that you do not know. Do not try to take them home with you. Cover windows and other openings in your home with screens so wild animals cannot get inside.      Get medical care if you get bitten by an animal. Do this even if the wound is very small.       Get your pet vaccinated against rabies. You will need to do this every 3 years or as directed by your .     What to do if an animal bites you: Clean the bite wound well and cover the wound with a clean bandage. Then contact your healthcare provider.       © Copyright [employer] 2019 All illustrations and images included in CareNotes are the copyrighted property of [employer] or [employer].            Rabies Vaccine    WHAT YOU NEED TO KNOW:    The rabies vaccine is an injection given to help prevent a rabies virus infection. The virus is spread to humans through the bite of an infected animal. Dogs, bats, skunks, coyotes, raccoons, and foxes are examples of animals that can carry rabies. The rabies vaccine can protect you from being infected with the virus. The vaccine can also prevent you from developing rabies even if you get it after you were bitten by an animal.     DISCHARGE INSTRUCTIONS:    Call 911 for any of the following:     Your mouth and throat are swollen.      You are wheezing or have trouble breathing.      You have chest pain or your heart is beating faster than normal for you.      You feel like you are going to faint.    Return to the emergency department if:     Your face is red or swollen.      You have hives that spread over your body.      You feel weak or dizzy.    Contact your healthcare provider if:     You have increased pain, redness, or swelling around the area where the shot was given.      You have questions or concerns about the rabies vaccine.    Apply a warm compress to the injection area as directed to decrease pain and swelling.    Follow up with your healthcare provider as directed: Write down your questions so you remember to ask them during your visits.        © Copyright [employer] 2019 All illustrations and images included in CareNotes are the copyrighted property of [employer] or [employer].

## 2021-02-25 NOTE — BRIEF OPERATIVE NOTE - ANTIBIOTIC PROTOCOL
MATERNAL FETAL MEDICINE CONSULT FOLLOW-UP    Love Golden   2021    REFERRING PROVIDER: Dr. Torie Abreu MD     Love is a 37 year old   with intrauterine pregnancy at 21w6d who presents today for an MFM consult follow-up due to:  1. Echogenic intracardiac focus of fetus on prenatal ultrasound    2. History of loop electrosurgical excision procedure (LEEP) of cervix affecting pregnancy in second trimester    3. History of  delivery    4. Multigravida of advanced maternal age in second trimester    5. Obesity affecting pregnancy in second trimester        HPI: Patient is doing well and without complaints. Reports normal fetal movement. Denies vaginal bleeding, leakage of fluid or contractions. Had some cramping last night but it resolved.  She has questions regarding the safety of the COVID-19 vaccine.    Current Outpatient Medications   Medication Sig Dispense Refill   • ASPIRIN PO Take 81 mg by mouth.     • Prenatal MV-Min-Fe Fum-FA-DHA (PRENATAL 1 PO)        No current facility-administered medications for this visit.       ?   OB History    Para Term  AB Living   2 1   1   1   SAB TAB Ectopic Molar Multiple Live Births             1      # Outcome Date GA Lbr Vern/2nd Weight Sex Delivery Anes PTL Lv   2 Current            1  14 34w0d  2.637 kg (5 lb 13 oz) M Vag-Spont   YANG    ?   ?   Review of patient's allergies indicates:   ALLERGIES:  No Known Allergies     Past Medical History:   Diagnosis Date   • Allergic rhinitis    • Anxiety    • Depression    • OCD (obsessive compulsive disorder)        Past Surgical History:   Procedure Laterality Date   • Leep     • Wrist surgery Left        Family History   Problem Relation Age of Onset   • Macular degeneration Mother    • Hyperlipidemia Father    • Hypertension Father    • Cancer, Lung Maternal Grandmother    • Cancer Maternal Grandfather    • Diabetes Maternal Grandfather    • Aneurysm Paternal Grandmother          brain   • Cancer, Throat Paternal Grandfather       ?   Social History     Tobacco Use   • Smoking status: Former Smoker     Packs/day: 0.00   • Smokeless tobacco: Never Used   • Tobacco comment: smoked in her 20s   Substance Use Topics   • Alcohol use: No     Frequency: Never   • Drug use: Not on file    ?   ?   REVIEW OF SYSTEMS:   Headaches: None   Nausea/vomiting:  None   Reports fetal movement: Yes  Abdominal pain/tenderness/cramping/contractions: cramping last night  Vaginal bleeding: none  Vaginal leaking of fluid: none    Lg pain/tenderness: none  All other systems negative unless noted in the HPI    PHYSICAL EXAM:   Visit Vitals  /68   Pulse 92   Temp 98.1 °F (36.7 °C)   Resp 16   Wt 103.1 kg (227 lb 3.2 oz)   BMI 36.67 kg/m²       General: A&Ox3, NAD, pleasant   Lungs: non-labored breathing  Abdomen: soft, nontender,  distended by gravid uterus  Extremities: no c/c/e  Skin: no rashes  Pelvic Exam: deferred     ULTRASOUND RESULTS:  Ultrasound performed today demonstrating a single live intrauterine pregnancy.  Fetal heart rate is 149 bpm.  Deepest vertical pocket amniotic fluid is 3.36 cm.  Transvaginal cervical length is measuring 3.48 to 3.76 cm.  Please see formal ultrasound report for full details.    DISCUSSION:   1. I reviewed the ultrasound results as above.  Patient was offered reassurance that the cervical length is stable.  There is no evidence of funneling.  2. I discussed in detail the safety of the COVID-19 vaccination in pregnancy.  There is no long-term safety data available, however given the data that is available, there does not appear to be any detrimental effect to the fetus if the mother receives the COVID-19 vaccination during pregnancy.  Given that the patient is advanced maternal age, obese, and pregnant, I think that she is a reasonable candidate to obtain this vaccination once available.  Risks and benefits were reviewed.  3. PPROM/pre-term labor precautions  reviewed.  4. Fetal kick counts at 28 weeks.    RECOMMENDATIONS  1. Recommend follow-up ultrasound in 2-3 weeks for TVUS and growth secondary to h/o LEEP, PTD and obesity.  2. Recommend  surveillance starting at 36 weeks secondary to AMA.  3. Recommend delivery at 39 weeks' gestation.   4. Continue 17P injections weekly through 36 weeks gestation.    Thank you for the opportunity to assist with Love's obstetrical care. Please do not hesitate to contact me if you have any questions.    A letter regarding this visit has been sent to the referring provider.      Jennifer Curry DO  2021  8:51 AM      Followed protocol pt is on cefepime, vancomycin and flagyl./Followed protocol

## 2021-03-23 ENCOUNTER — OUTPATIENT (OUTPATIENT)
Dept: OUTPATIENT SERVICES | Facility: HOSPITAL | Age: 51
LOS: 1 days | Discharge: HOME | End: 2021-03-23
Payer: MEDICARE

## 2021-03-23 DIAGNOSIS — Z98.890 OTHER SPECIFIED POSTPROCEDURAL STATES: Chronic | ICD-10-CM

## 2021-03-23 DIAGNOSIS — N64.4 MASTODYNIA: ICD-10-CM

## 2021-03-23 DIAGNOSIS — Z90.49 ACQUIRED ABSENCE OF OTHER SPECIFIED PARTS OF DIGESTIVE TRACT: Chronic | ICD-10-CM

## 2021-03-23 PROCEDURE — 77066 DX MAMMO INCL CAD BI: CPT | Mod: 26

## 2021-03-23 PROCEDURE — 76642 ULTRASOUND BREAST LIMITED: CPT | Mod: 26,LT

## 2021-03-23 PROCEDURE — G0279: CPT | Mod: 26

## 2022-10-19 ENCOUNTER — NON-APPOINTMENT (OUTPATIENT)
Age: 52
End: 2022-10-19

## 2023-03-11 ENCOUNTER — OUTPATIENT (OUTPATIENT)
Dept: OUTPATIENT SERVICES | Facility: HOSPITAL | Age: 53
LOS: 1 days | End: 2023-03-11
Payer: MEDICARE

## 2023-03-11 DIAGNOSIS — Z98.890 OTHER SPECIFIED POSTPROCEDURAL STATES: Chronic | ICD-10-CM

## 2023-03-11 DIAGNOSIS — Z90.49 ACQUIRED ABSENCE OF OTHER SPECIFIED PARTS OF DIGESTIVE TRACT: Chronic | ICD-10-CM

## 2023-03-11 DIAGNOSIS — N64.4 MASTODYNIA: ICD-10-CM

## 2023-03-11 DIAGNOSIS — Z00.8 ENCOUNTER FOR OTHER GENERAL EXAMINATION: ICD-10-CM

## 2023-03-11 PROCEDURE — G0279: CPT | Mod: 26

## 2023-03-11 PROCEDURE — 76642 ULTRASOUND BREAST LIMITED: CPT | Mod: LT

## 2023-03-11 PROCEDURE — 77066 DX MAMMO INCL CAD BI: CPT | Mod: 26

## 2023-03-11 PROCEDURE — 77066 DX MAMMO INCL CAD BI: CPT

## 2023-03-11 PROCEDURE — 76642 ULTRASOUND BREAST LIMITED: CPT | Mod: 26,LT

## 2023-03-11 PROCEDURE — G0279: CPT

## 2023-03-12 DIAGNOSIS — N64.4 MASTODYNIA: ICD-10-CM

## 2023-04-26 NOTE — ED ADULT NURSE NOTE - PAIN RATING/NUMBER SCALE (0-10): ACTIVITY
CT HEAD WO CONT    Result Date: 4/26/2023  EXAM: CT HEAD WO CONT INDICATION: head injury COMPARISON: None. CONTRAST: None. TECHNIQUE: Unenhanced CT of the head was performed using 5 mm images. Brain and bone windows were generated. Coronal and sagittal reformats. CT dose reduction was achieved through use of a standardized protocol tailored for this examination and automatic exposure control for dose modulation. FINDINGS: There is no extra-axial fluid collection hemorrhage shift or masses. Negative. 4

## 2023-11-02 ENCOUNTER — APPOINTMENT (OUTPATIENT)
Dept: ORTHOPEDIC SURGERY | Facility: CLINIC | Age: 53
End: 2023-11-02
Payer: MEDICARE

## 2023-11-02 VITALS — BODY MASS INDEX: 21.66 KG/M2 | HEIGHT: 65 IN | WEIGHT: 130 LBS

## 2023-11-02 DIAGNOSIS — M25.561 PAIN IN RIGHT KNEE: ICD-10-CM

## 2023-11-02 PROCEDURE — 99203 OFFICE O/P NEW LOW 30 MIN: CPT

## 2023-11-20 ENCOUNTER — APPOINTMENT (OUTPATIENT)
Dept: ORTHOPEDIC SURGERY | Facility: CLINIC | Age: 53
End: 2023-11-20

## 2023-11-27 ENCOUNTER — APPOINTMENT (OUTPATIENT)
Dept: MRI IMAGING | Facility: CLINIC | Age: 53
End: 2023-11-27

## 2023-11-30 ENCOUNTER — APPOINTMENT (OUTPATIENT)
Dept: ORTHOPEDIC SURGERY | Facility: CLINIC | Age: 53
End: 2023-11-30

## 2024-05-02 ENCOUNTER — OUTPATIENT (OUTPATIENT)
Dept: OUTPATIENT SERVICES | Facility: HOSPITAL | Age: 54
LOS: 1 days | End: 2024-05-02
Payer: MEDICARE

## 2024-05-02 DIAGNOSIS — Z13.820 ENCOUNTER FOR SCREENING FOR OSTEOPOROSIS: ICD-10-CM

## 2024-05-02 DIAGNOSIS — Z00.8 ENCOUNTER FOR OTHER GENERAL EXAMINATION: ICD-10-CM

## 2024-05-02 DIAGNOSIS — R92.8 OTHER ABNORMAL AND INCONCLUSIVE FINDINGS ON DIAGNOSTIC IMAGING OF BREAST: ICD-10-CM

## 2024-05-02 DIAGNOSIS — Z98.890 OTHER SPECIFIED POSTPROCEDURAL STATES: Chronic | ICD-10-CM

## 2024-05-02 DIAGNOSIS — Z90.49 ACQUIRED ABSENCE OF OTHER SPECIFIED PARTS OF DIGESTIVE TRACT: Chronic | ICD-10-CM

## 2024-05-02 DIAGNOSIS — Z12.31 ENCOUNTER FOR SCREENING MAMMOGRAM FOR MALIGNANT NEOPLASM OF BREAST: ICD-10-CM

## 2024-05-02 PROCEDURE — 76641 ULTRASOUND BREAST COMPLETE: CPT | Mod: 50

## 2024-05-02 PROCEDURE — 77080 DXA BONE DENSITY AXIAL: CPT | Mod: 26

## 2024-05-02 PROCEDURE — 77066 DX MAMMO INCL CAD BI: CPT | Mod: 26

## 2024-05-02 PROCEDURE — 77066 DX MAMMO INCL CAD BI: CPT

## 2024-05-02 PROCEDURE — 76641 ULTRASOUND BREAST COMPLETE: CPT | Mod: 26,50

## 2024-05-02 PROCEDURE — 77080 DXA BONE DENSITY AXIAL: CPT

## 2024-05-02 PROCEDURE — G0279: CPT

## 2024-05-02 PROCEDURE — G0279: CPT | Mod: 26

## 2024-05-03 DIAGNOSIS — Z13.820 ENCOUNTER FOR SCREENING FOR OSTEOPOROSIS: ICD-10-CM

## 2024-05-03 DIAGNOSIS — R92.8 OTHER ABNORMAL AND INCONCLUSIVE FINDINGS ON DIAGNOSTIC IMAGING OF BREAST: ICD-10-CM

## 2025-05-07 NOTE — H&P PST ADULT - I HAVE PERSONALLY SEEN AND EXAMINED THE PATIENT. THERE HAVE NOT BEEN ANY CHANGES IN THE PATIENT'S HISTORY OR EXAM UNLESS COMMENTED BELOW
Quality 226: Preventive Care And Screening: Tobacco Use: Screening And Cessation Intervention: Patient screened for tobacco use and is an ex/non-smoker
Quality 47: Advance Care Plan: Advance Care Planning discussed and documented; advance care plan or surrogate decision maker documented in the medical record.
Detail Level: Detailed
Statement Selected

## 2025-05-28 NOTE — ED ADULT NURSE NOTE - NS ED NURSE RECORD ANOTHER HT AND WT
Received paperwork form [employer] requesting authorization for Dexcmo G7.  Paperwork completed and left out for Dr. Lyons to sign.  Once signed, will fax.   Yes

## 2025-06-01 NOTE — PROGRESS NOTE ADULT - ASSESSMENT
[facility]    History and Physical - Hospitalist Service       Date of Admission:  6/1/2025    Mary Olson is a 66 year old female, history of breast cancer, enteritis, diabetes mellitis type 2, hypertension, and hyperlipidemia, who presents to this ED by walk in for evaluation of generalized weakness and fall.     Assessment & Plan    DKA, severe with anion gap metabolic acidosis  Known type II diabetic on metformin at home,  Lab glucose 1009, ketones more than 9, bicarb 8, pH 7.24[vbg]  Recently started Alpelisib due to progressive metastatic breast cancer, this drug is known to cause hyperglycemia/DKA  Hold above cancer medication  Admit to ICU, DKA protocol placed, check A1c  Consult nephrology for recommendations given severe acidosis  N.p.o., IV hydration, obtain UA and check a chest x-ray    Generalized weakness/fall at home  CT head/neck no acute CVA or fractures, chronic metastatic disease  On exam with no long bone injury,  EKG no acute ST changes, troponin x 16, repeat pending  Weakness likely due to dehydration and DKA above  Continue treatment plan above, PT and OT when able    Confusion noted at bedside, occurred yesterday per family  CT head negative for CVA or ICH  Likely due to severe DKA/dehydration, neurochecks ordered  Slowly titrate down blood glucose with IV insulin to avoid severe complications    Elevated creatinine 1.68, above prior baseline  Likely CHIQUITA due to above dehydration  Avoid nephrotoxins, BMP daily  Await nephrology evaluation    Metastatic breast cancer, with osseous mets, also noted on prior chest imaging with malignant small pleural effusion/rib fracture  Follow chest x-ray pending  No complaints of pain currently  Follow-up oncology when stable    Chronic diarrhea, likely chemo related  No abdominal symptoms, frequency remains the same  Continue home antidiarrheal medications as needed      Chronic medical issues  Hypertension, stable BP, PTA  meds  Hyperlipidemia stable, not on statin     DNR/DNI per discussion with patient family and /POA at bedside, this is reportedly patient's wish  Will ask her ED nurse to confirm          Diet:  N.p.o. for now  DVT Prophylaxis: Heparin SQ given high risk for thrombosis  Correa Catheter: Not present  Lines: PRESENT             Cardiac Monitoring: None  Code Status:  Above    Clinically Significant Risk Factors Present on Admission        # Hyperkalemia: Highest K = 5.5 mmol/L in last 2 days, will monitor as appropriate  # Hyponatremia: Lowest Na = 124 mmol/L in last 2 days, will monitor as appropriate  # Hypochloremia: Lowest Cl = 76 mmol/L in last 2 days, will monitor as appropriate  # Hypocalcemia: Lowest Ca = 8.1 mg/dL in last 2 days, will monitor and replace as appropriate    # Anion Gap Metabolic Acidosis: Highest Anion Gap = 40 mmol/L in last 2 days, will monitor and treat as appropriate    # Drug Induced Platelet Defect: home medication list includes an antiplatelet medication  # Acute Kidney Injury, unspecified: based on a >150% or 0.3 mg/dL increase in last creatinine compared to past 90 day average, will monitor renal function  # Hypertension: Noted on problem list          # DMII: A1C = 9.3 % (Ref range: <5.7 %) within past 6 months               Disposition Plan     Medically Ready for Discharge: Anticipated in 2-4 Days           Neftaly Bowen MD  Hospitalist Service  [facility]  Securely message with [employer] (more info)  Text page via [employer] Paging/Directory     ______________________________________________________________________    Chief Complaint   Generalized weakness, unwitnessed fall over the past 1 week    History is obtained from the patient /POA/ED note    History of Present Illness      patient comes in today for evaluation of generalized weakness, and increasing lethargy for the last week.  Recently started a new chemo medication for progressive breast  49F w/ PMH of breast cancer, PE, and total colectomy for UC s/p laparoscopic total hysterectomy with salpingo-oophorectomy on 9/17 with ileus vs. obstruction. Now has bowel function but has spiked low grade fevers (most recently 9/28 at 8pm 100.4) and up trending WBC (15-->16-->18)    Plan:   - trend wbc   - monitor fevers   - evaluate Abx regiment (on metronidazole and levaquin)   - CT abdomen pelvis with IV and oral to best evaluate for abscess formation cancer disease which is known to cause high blood sugars.  She was given subcu insulin at that time which she claims compliance with.  She has noticed blood sugars at home range in the 400 range, despite above insulin.  Also has ongoing chronic diarrhea, and reduced p.o. intake.  She rolled out of bed earlier today but denies any fall.  No chest pain or SOB, no head injuries, no upper or lower extremity weakness, family has also noticed increasing confusion for the last few days.  Denies abdominal pain, no  symptoms or GI symptoms, no fever or chills, and no SOB.     In ED patient noted confused, blood pressure 127/83, heart rate 116, temperature 97.8, sats 100% on room air.  General not in severe distress, head/neck CT with no fractures or CVA.  Labs showed evidence for DKA with high glucose, positive ketones and acidosis.  IV insulin was started, IV fluids given, also received IV bicarb, admitted to hospital medicine for further care.      Past Medical History    Past Medical History:   Diagnosis Date    ASCUS favoring benign 10/28/15    neg HPV    Cancer (H)     Diabetes mellitus, type 2 (H) 7/14/2020    Hypertension     Obese        Past Surgical History   Past Surgical History:   Procedure Laterality Date    BIOPSY  03/2020    CATARACT EXTRACTION Bilateral 10/2024    COLONOSCOPY N/A 10/14/2020    Procedure: COLONOSCOPY, WITH POLYPECTOMY  hot snare;  Surgeon: Leventhal, Thomas Michael, MD;  Location: [facility] OR    GYN SURGERY  3/13/89. & 6/22/92    C Sections    IR CHEST PORT PLACEMENT > 5 YRS OF AGE  03/18/2020       Prior to Admission Medications   Prior to Admission Medications   Prescriptions Last Dose Informant Patient Reported? Taking?   Ascorbic Acid (VITAMIN C PO) 5/31/2025 Morning  Yes Yes   Sig: Take 500 mg by mouth daily.   Blood Pressure KIT   No No   Sig: Please test blood pressure at home 2 x daily   Coenzyme Q10 (COQ-10) 100 MG CAPS   Yes Yes   Sig: Take 1 capsule by mouth daily as needed  (supplementation).   Cyanocobalamin (B-12) 1000 MCG TABS 2025 Morning  Yes Yes   Sig: Take 1 tablet by mouth daily.   STATIN NOT PRESCRIBED (INTENTIONAL)   Yes No   Sig: Please choose reason not prescribed from choices below.   alcohol swab prep pads   No No   Sig: Use to swab area of injection/chrissy as directed.   alpelisib (PIQRAY) 2 x 150 MG tablet pack 2025 Morning  No Yes   Sig: Take 2 tablets (300 mg) by mouth daily for 28 days. . Take with food.   amLODIPine (NORVASC) 10 MG tablet 2025 Morning  No Yes   Sig: Take 1 tablet (10 mg) by mouth daily.   aspirin 81 MG tablet Past Week  Yes Yes   Sig: Take 81 mg by mouth daily.   blood glucose (NO BRAND SPECIFIED) test strip   No No   Si strip by In Vitro route 3 times daily   blood glucose calibration (NO BRAND SPECIFIED) solution   No No   Sig: To accompany: Blood Glucose Monitor Brands: per insurance.   blood glucose monitoring (NO BRAND SPECIFIED) meter device kit   No No   Sig: Use to test blood sugar 3 times daily or as directed.   gabapentin (NEURONTIN) 250 MG/5ML solution 2025 Bedtime  No Yes   Sig: Take 5 mLs (250 mg) by mouth at bedtime.   hydrocortisone 2.5 % cream   Yes Yes   Sig: Apply topically 2 times daily as needed for rash.   insulin aspart (NOVOLOG PEN) 100 UNIT/ML pen 2025 Evening  No Yes   Sig: Check BS tid before meals,  5 units, - 150: 10 units,151- 200: 15 units, - 250 : 18 units, over 251 : 20 units ( max 50 units/ day )   insulin pen needle (31G X 8 MM) 31G X 8 MM miscellaneous   No No   Sig: Use 4 pen needles daily or as directed.   lidocaine-prilocaine (EMLA) 2.5-2.5 % external cream   No Yes   Sig: Apply small amount to center of port site 45-60 minutes prior to chemo appointment, cover with clear dressing if desired.   loperamide (IMODIUM) 2 MG capsule   No Yes   Sig: Start with 2 caps (4 mg), then take one cap (2 mg) after each diarrheal stool as needed. Do not use more than 8 caps (16 mg)  per day.   magic mouthwash suspension (diphenhydrAMINE, lidocaine, aluminum-magnesium & simethicone)   No Yes   Sig: Swish and swallow 5 mLs in mouth every 6 hours as needed for mouth sores.   metFORMIN (GLUCOPHAGE XR) 500 MG 24 hr tablet 5/31/2025 Evening  Yes Yes   Sig: Take 500 mg by mouth 2 times daily (with meals).   thin (NO BRAND SPECIFIED) lancets   No No   Sig: 3 each by In Vitro route daily Use with lanceting device. To accompany: Blood Glucose Monitor Brands: per insurance.   triamcinolone (KENALOG) 0.1 % external cream   Yes Yes   Sig: Apply topically 2 times daily as needed for irritation.   triamterene-HCTZ (MAXZIDE-25) 37.5-25 MG tablet 5/31/2025 Morning  No Yes   Sig: Take 1 tablet by mouth daily.      Facility-Administered Medications: None           Physical Exam   Vital Signs: Temp: 97.8  F (36.6  C) Temp src: Axillary BP: 130/58 Pulse: 110   Resp: 23 SpO2: 100 %      Weight: 0 lbs 0 oz  General Appearance: Pleasantly confused, but follows commands  Respiratory: Clear anteriorly  Cardiovascular: S1-S2 tachycardia  GI: Nondistended, soft, nontender, BS heard and normal  Skin: No erythema  Other: No extremity edema  Neuro, confused, no facial weakness, speech appears normal, seen moving upper and lower extremities,      Medical Decision Making       7 5 MINUTES SPENT BY ME on the date of service doing chart review, history, exam, documentation & further activities per the note.      Data   Imaging results reviewed over the past 24 hrs:   Recent Results (from the past 24 hours)   Head CT w/o contrast    Narrative    EXAM: CT HEAD W/O CONTRAST, CT CERVICAL SPINE W/O CONTRAST  LOCATION: [facility]  DATE: 6/1/2025    INDICATION: head injury with amnesia  COMPARISON: MRI brain 1/27/2025  TECHNIQUE:   1) Routine CT Head without IV contrast. Multiplanar reformats. Dose reduction techniques were used.  2) Routine CT Cervical Spine without IV contrast. Multiplanar reformats. Dose  reduction techniques were used.    FINDINGS:   HEAD CT:   INTRACRANIAL CONTENTS: No intracranial hemorrhage, extraaxial collection, or mass effect.  No CT evidence of acute infarct. Mild presumed chronic small vessel ischemic changes. Mild generalized volume loss. No hydrocephalus.     VISUALIZED ORBITS/SINUSES/MASTOIDS: Prior bilateral cataract surgery. Visualized portions of the orbits are otherwise unremarkable. No paranasal sinus mucosal disease. No middle ear or mastoid effusion.    BONES/SOFT TISSUES: No scalp hematoma. No skull fracture.    CERVICAL SPINE CT:   VERTEBRA: Normal vertebral body heights and alignment. No fracture or posttraumatic subluxation. Diffuse sclerotic osseous metastatic lesions are present throughout the visualized spine. No definite pathologic fractures.    CANAL/FORAMINA: There is multilevel disc height loss and facet hypertrophy. No high-grade spinal canal stenosis. Scattered mild-to-moderate neural foraminal stenosis throughout.    PARASPINAL: No extraspinal abnormality. Visualized lung fields are clear.      Impression    IMPRESSION:  HEAD CT:  No acute intracranial process.    CERVICAL SPINE CT:  1.  No CT evidence for acute fracture or post traumatic subluxation.  2.  Diffuse osseous metastatic disease.     CT Cervical Spine w/o Contrast    Narrative    EXAM: CT HEAD W/O CONTRAST, CT CERVICAL SPINE W/O CONTRAST  LOCATION: [facility]  DATE: 6/1/2025    INDICATION: head injury with amnesia  COMPARISON: MRI brain 1/27/2025  TECHNIQUE:   1) Routine CT Head without IV contrast. Multiplanar reformats. Dose reduction techniques were used.  2) Routine CT Cervical Spine without IV contrast. Multiplanar reformats. Dose reduction techniques were used.    FINDINGS:   HEAD CT:   INTRACRANIAL CONTENTS: No intracranial hemorrhage, extraaxial collection, or mass effect.  No CT evidence of acute infarct. Mild presumed chronic small vessel ischemic changes. Mild  generalized volume loss. No hydrocephalus.     VISUALIZED ORBITS/SINUSES/MASTOIDS: Prior bilateral cataract surgery. Visualized portions of the orbits are otherwise unremarkable. No paranasal sinus mucosal disease. No middle ear or mastoid effusion.    BONES/SOFT TISSUES: No scalp hematoma. No skull fracture.    CERVICAL SPINE CT:   VERTEBRA: Normal vertebral body heights and alignment. No fracture or posttraumatic subluxation. Diffuse sclerotic osseous metastatic lesions are present throughout the visualized spine. No definite pathologic fractures.    CANAL/FORAMINA: There is multilevel disc height loss and facet hypertrophy. No high-grade spinal canal stenosis. Scattered mild-to-moderate neural foraminal stenosis throughout.    PARASPINAL: No extraspinal abnormality. Visualized lung fields are clear.      Impression    IMPRESSION:  HEAD CT:  No acute intracranial process.    CERVICAL SPINE CT:  1.  No CT evidence for acute fracture or post traumatic subluxation.  2.  Diffuse osseous metastatic disease.     XR Chest Port 1 View    Narrative    EXAM: XR CHEST PORT 1 VIEW  LOCATION: [facility]  DATE: 6/1/2025    INDICATION: Weakness.  COMPARISON: CT chest 4/25/2025      Impression    IMPRESSION: Right IJ Port-A-Cath tip in mid SVC. Heart size and vascularity are normal. Small moderate left pleural effusion overall similar with associated left basilar atelectasis. Right lung clear. No pneumothorax.

## 2025-06-10 NOTE — ED PROVIDER NOTE - CLINICAL SUMMARY MEDICAL DECISION MAKING FREE TEXT BOX
PAST SURGICAL HISTORY:  No significant past surgical history     
51yo F presents for last dose in rabies vaccine series. VS WNL. Asymptomatic.